# Patient Record
Sex: FEMALE | Race: WHITE
[De-identification: names, ages, dates, MRNs, and addresses within clinical notes are randomized per-mention and may not be internally consistent; named-entity substitution may affect disease eponyms.]

---

## 2017-03-21 NOTE — CR
EXAM DATE: 17



PATIENT'S AGE: 31



Patient: JOSELITO EUGENE



Facility: [place], [place]

Patient ID: [id number]

Site Patient ID: [id number].

Site Accession #: [id number]

: 1985

Study: XRay Chest [id number]/20/2017 4:43:51 PM

Ordering Physician: Luis Magallon



Final Report: 

INDICATION: abnormal weight loss, cough



TECHNIQUE:

Chest 2 views.



COMPARISON:

17



FINDINGS:

Cardiovascular and mediastinum: Heart size and vasculature are normal in 
caliber and appearance. Mediastinum is within normal limits. 



Lungs and pleural spaces: Lungs are clear. No sign of infiltrate or mass. No 
sign of pleural effusion. No pneumothorax. 



Bones and soft tissues: No significant findings. 



IMPRESSION:

Unremarkable chest.



Dictated by: Rodrigo Redmond MD @ 2017 21:01:07

(Electronic Signature)



Report Signed by Proxy and Original Signed Document filed in the

Medical Record.
MTDD

## 2017-12-02 ENCOUNTER — HOSPITAL ENCOUNTER (EMERGENCY)
Dept: HOSPITAL 56 - MW.ED | Age: 32
Discharge: HOME | End: 2017-12-02
Payer: SELF-PAY

## 2017-12-02 VITALS — DIASTOLIC BLOOD PRESSURE: 63 MMHG | SYSTOLIC BLOOD PRESSURE: 116 MMHG

## 2017-12-02 DIAGNOSIS — F17.210: ICD-10-CM

## 2017-12-02 DIAGNOSIS — H70.91: Primary | ICD-10-CM

## 2017-12-02 DIAGNOSIS — F41.9: ICD-10-CM

## 2017-12-02 DIAGNOSIS — J32.2: ICD-10-CM

## 2017-12-02 LAB
CHLORIDE SERPL-SCNC: 107 MMOL/L (ref 98–110)
SODIUM SERPL-SCNC: 139 MMOL/L (ref 136–146)

## 2017-12-02 PROCEDURE — 96361 HYDRATE IV INFUSION ADD-ON: CPT

## 2017-12-02 PROCEDURE — 71010: CPT

## 2017-12-02 PROCEDURE — 81001 URINALYSIS AUTO W/SCOPE: CPT

## 2017-12-02 PROCEDURE — 96374 THER/PROPH/DIAG INJ IV PUSH: CPT

## 2017-12-02 PROCEDURE — 99285 EMERGENCY DEPT VISIT HI MDM: CPT

## 2017-12-02 PROCEDURE — 80305 DRUG TEST PRSMV DIR OPT OBS: CPT

## 2017-12-02 PROCEDURE — C9113 INJ PANTOPRAZOLE SODIUM, VIA: HCPCS

## 2017-12-02 PROCEDURE — 80053 COMPREHEN METABOLIC PANEL: CPT

## 2017-12-02 PROCEDURE — G0480 DRUG TEST DEF 1-7 CLASSES: HCPCS

## 2017-12-02 PROCEDURE — 85025 COMPLETE CBC W/AUTO DIFF WBC: CPT

## 2017-12-02 PROCEDURE — 81025 URINE PREGNANCY TEST: CPT

## 2017-12-02 PROCEDURE — 70450 CT HEAD/BRAIN W/O DYE: CPT

## 2017-12-02 PROCEDURE — 36415 COLL VENOUS BLD VENIPUNCTURE: CPT

## 2017-12-02 PROCEDURE — 84484 ASSAY OF TROPONIN QUANT: CPT

## 2017-12-02 PROCEDURE — 93005 ELECTROCARDIOGRAM TRACING: CPT

## 2017-12-02 PROCEDURE — 96375 TX/PRO/DX INJ NEW DRUG ADDON: CPT

## 2017-12-02 NOTE — EDM.PDOC
ED HPI GENERAL MEDICAL PROBLEM





- General


Chief Complaint: Neuro Symptoms/Deficits


Stated Complaint: PT FEEL LIGHTHEADED


Time Seen by Provider: 12/02/17 20:14


Source of Information: Reports: Patient





- History of Present Illness


INITIAL COMMENTS - FREE TEXT/NARRATIVE: 





HISTORY AND PHYSICAL:


History of present illness:


[Patient with history of anxiety and as stopped her medication presents with 

lightheaded dizziness and shortness of breath that is improved by drinking 

alcohol. She appears intoxicated and smells of alcohol and is in no other 

apparent distress





Denies fever nausea vomiting chills sweats no chest pain headache palpitation 

bowel or urine symptoms





Denies any other chronic illness or disease denies current medications she was 

previously on Lexapro





She smokes heavily denies illicit drugs and admitting drinks up to 18 beers 

daily, previous detox and treatment





]


Review of systems: 


As per history of present illness and below otherwise all systems reviewed and 

negative.


Past medical history: 


As per history of present illness and as reviewed below otherwise 

noncontributory.


Surgical history: 


As per history of present illness and as reviewed below otherwise 

noncontributory.


Social history: 


No reported history of drug or alcohol abuse.


Family history: 


As per history of present illness and as reviewed below otherwise 

noncontributory.








Physical exam:


HEENT: Atraumatic, normocephalic, pupils reactive, negative for conjunctival 

pallor or scleral icterus, mucous membranes moist, throat clear, neck supple, 

nontender, trachea midline.


Lungs: Clear to auscultation, breath sounds equal bilaterally, chest nontender.


Heart: S1S2, regular, negative for clicks, rubs, or JVD.


Abdomen: Soft, nondistended, nontender. Negative for masses or 

hepatosplenomegaly. Negative for costovertebral tenderness.


Pelvis: Stable nontender.


Genitourinary: Deferred.


Rectal: Deferred.


Extremities: Atraumatic, negative for cords or calf pain. Neurovascular 

unremarkable.


Neuro: Awake, alert, oriented. Cranial nerves II through XII unremarkable. 

Cerebellum unremarkable. Motor and sensory unremarkable throughout. Exam 

nonfocal.








Diagnostics:


[]CBC CMP UA hCG troponin drug screen


Chest 1 view


EKG


Head CT no contrast








Therapeutics:


[]1 L normal saline bolus


Protonix 80 mg IV


Zofran 8 mg IV





Augmentin 875 per 125 by mouth twice a day #20 no refill








Impression: 


[Alcohol intoxication


Right mastoid and ethmoid sinusitis


Anxiety


Medication noncompliance


]


Definitive disposition and diagnosis as appropriate pending reevaluation and 

review of above.





- Related Data


 Allergies











Allergy/AdvReac Type Severity Reaction Status Date / Time


 


No Known Allergies Allergy   Verified 12/02/17 20:35











Home Meds: 


 Home Meds





. [No Known Home Meds]  12/02/17 [History]











Past Medical History





- Past Health History


Medical/Surgical History: Denies Medical/Surgical History


HEENT History: Reports: None


Cardiovascular History: Reports: None


Respiratory History: Reports: None


Gastrointestinal History: Reports: None


OB/GYN History: Reports: None


Musculoskeletal History: Reports: None


Neurological History: Reports: None


Psychiatric History: Reports: None


Endocrine/Metabolic History: Reports: None


Oncologic (Cancer) History: Reports: None


Dermatologic History: Reports: None





- Infectious Disease History


Infectious Disease History: Reports: Chicken Pox





- Past Surgical History


HEENT Surgical History: Reports: None


Cardiovascular Surgical History: Reports: None


Endocrine Surgical History: Reports: None


Neurological Surgical History: Reports: None


Musculoskeletal Surgical History: Reports: None


Dermatological Surgical History: Reports: None





Social & Family History





- Family History


HEENT: Reports: None


Cardiac: Reports: None


Endocrine/Metabolic: Reports: None





- Tobacco Use


Smoking Status *Q: Current Every Day Smoker


Years of Tobacco use: 10


Packs/Tins Daily: 2





- Caffeine Use


Caffeine Use: Reports: None





- Alcohol Use


Days Per Week of Alcohol Use: 1


Number of Drinks Per Day: 1


Total Drinks Per Week: 1





- Recreational Drug Use


Recreational Drug Use: No





ED ROS GENERAL





- Review of Systems


Review Of Systems: ROS reveals no pertinent complaints other than HPI.





ED EXAM, GENERAL





- Physical Exam


Exam: See Below





Course





- Vital Signs


Last Recorded V/S: 


 Last Vital Signs











Temp  97.1 F   12/02/17 20:15


 


Pulse  92   12/02/17 20:15


 


Resp  14   12/02/17 20:15


 


BP  166/97 H  12/02/17 20:15


 


Pulse Ox  96   12/02/17 20:15














- Orders/Labs/Meds


Orders: 


 Active Orders 24 hr











 Category Date Time Status


 


 EKG Documentation Completion [RC] STAT Care  12/02/17 20:13 Active


 


 Chest 1V Frontal [CR] Stat Exams  12/02/17 20:13 Taken


 


 Head wo Cont [CT] Stat Exams  12/02/17 20:13 Taken


 


 UA W/MICROSCOPIC [URIN] Stat Lab  12/02/17 21:06 Results











Labs: 


 Laboratory Tests











  12/02/17 12/02/17 12/02/17 Range/Units





  20:22 20:22 21:06 


 


WBC  10.58    (4.0-11.0)  K/uL


 


RBC  4.89    (4.30-5.90)  M/uL


 


Hgb  15.9    (12.0-16.0)  g/dL


 


Hct  45.8    (36.0-46.0)  %


 


MCV  93.7    (80.0-98.0)  fL


 


MCH  32.5 H    (27.0-32.0)  pg


 


MCHC  34.7    (31.0-37.0)  g/dL


 


RDW Std Deviation  50.2    (28.0-62.0)  fl


 


RDW Coeff of Flo  15    (11.0-15.0)  %


 


Plt Count  181    (150-400)  K/uL


 


MPV  9.80    (7.40-12.00)  fL


 


Neut % (Auto)  60.5    (48.0-80.0)  %


 


Lymph % (Auto)  30.5    (16.0-40.0)  %


 


Mono % (Auto)  7.0    (0.0-15.0)  %


 


Eos % (Auto)  1.8    (0.0-7.0)  %


 


Baso % (Auto)  0.2    (0.0-1.5)  %


 


Neut # (Auto)  6.4 H    (1.4-5.7)  K/uL


 


Lymph # (Auto)  3.2 H    (0.6-2.4)  K/uL


 


Mono # (Auto)  0.7    (0.0-0.8)  K/uL


 


Eos # (Auto)  0.2    (0.0-0.7)  K/uL


 


Baso # (Auto)  0.0    (0.0-0.1)  K/uL


 


Nucleated RBC %  0.0    /100WBC


 


Nucleated RBCs #  0    K/uL


 


Sodium   139   (136-146)  mmol/L


 


Potassium   4.0   (3.5-5.1)  mmol/L


 


Chloride   107   ()  mmol/L


 


Carbon Dioxide   20 L   (21-31)  mmol/L


 


BUN   7   (6.0-23.0)  mg/dL


 


Creatinine   0.7   (0.6-1.5)  mg/dL


 


Est Cr Clr Drug Dosing   101.48   mL/min


 


Estimated GFR (MDRD)   > 60.0   ml/min


 


Glucose   91   ()  mg/dL


 


Calcium   9.4   (8.8-10.8)  mg/dL


 


Total Bilirubin   0.8   (0.1-1.5)  mg/dL


 


AST   31   (5-40)  IU/L


 


ALT   20   (8-54)  IU/L


 


Alkaline Phosphatase   77   ()  


 


Troponin I   < 0.10   (0.0-0.29)  NG/ML


 


Total Protein   8.4 H   (6.0-8.0)  g/dL


 


Albumin   4.3   (3.5-5.0)  g/dL


 


Globulin   4.1 H   (2.0-3.5)  g/dL


 


Albumin/Globulin Ratio   1.1 L   (1.3-2.8)  


 


Urine Color     


 


Urine Appearance     


 


Urine pH     (5.0-8.0)  


 


Ur Specific Gravity     (1.001-1.035)  


 


Urine Protein     (NEGATIVE)  mg/dL


 


Urine Glucose (UA)     (NEGATIVE)  mg/dL


 


Urine Ketones     (NEGATIVE)  mg/dL


 


Urine Occult Blood     (NEGATIVE)  


 


Urine Nitrite     (NEGATIVE)  


 


Urine Bilirubin     (NEGATIVE)  


 


Urine Urobilinogen     (<2.0)  EU/dL


 


Ur Leukocyte Esterase     (NEGATIVE)  


 


Urine HCG, Qual    NEGATIVE  (NEGATIVE)  


 


Urine Opiates Screen     (NEGATIVE)  


 


Ur Oxycodone Screen     (NEGATIVE)  


 


Urine Methadone Screen     (NEGATIVE)  


 


Ur Barbiturates Screen     (NEGATIVE)  


 


Ur Phencyclidine Scrn     (NEGATIVE)  


 


Ur Amphetamine Screen     (NEGATIVE)  


 


U Methamphetamines Scrn     (NEGATIVE)  


 


U Benzodiazepines Scrn     (NEGATIVE)  


 


U Cocaine Metab Screen     (NEGATIVE)  


 


U Marijuana (THC) Screen     (NEGATIVE)  


 


Ethyl Alcohol   254.4   mg/dL














  12/02/17 12/02/17 Range/Units





  21:06 21:06 


 


WBC    (4.0-11.0)  K/uL


 


RBC    (4.30-5.90)  M/uL


 


Hgb    (12.0-16.0)  g/dL


 


Hct    (36.0-46.0)  %


 


MCV    (80.0-98.0)  fL


 


MCH    (27.0-32.0)  pg


 


MCHC    (31.0-37.0)  g/dL


 


RDW Std Deviation    (28.0-62.0)  fl


 


RDW Coeff of Flo    (11.0-15.0)  %


 


Plt Count    (150-400)  K/uL


 


MPV    (7.40-12.00)  fL


 


Neut % (Auto)    (48.0-80.0)  %


 


Lymph % (Auto)    (16.0-40.0)  %


 


Mono % (Auto)    (0.0-15.0)  %


 


Eos % (Auto)    (0.0-7.0)  %


 


Baso % (Auto)    (0.0-1.5)  %


 


Neut # (Auto)    (1.4-5.7)  K/uL


 


Lymph # (Auto)    (0.6-2.4)  K/uL


 


Mono # (Auto)    (0.0-0.8)  K/uL


 


Eos # (Auto)    (0.0-0.7)  K/uL


 


Baso # (Auto)    (0.0-0.1)  K/uL


 


Nucleated RBC %    /100WBC


 


Nucleated RBCs #    K/uL


 


Sodium    (136-146)  mmol/L


 


Potassium    (3.5-5.1)  mmol/L


 


Chloride    ()  mmol/L


 


Carbon Dioxide    (21-31)  mmol/L


 


BUN    (6.0-23.0)  mg/dL


 


Creatinine    (0.6-1.5)  mg/dL


 


Est Cr Clr Drug Dosing    mL/min


 


Estimated GFR (MDRD)    ml/min


 


Glucose    ()  mg/dL


 


Calcium    (8.8-10.8)  mg/dL


 


Total Bilirubin    (0.1-1.5)  mg/dL


 


AST    (5-40)  IU/L


 


ALT    (8-54)  IU/L


 


Alkaline Phosphatase    ()  


 


Troponin I    (0.0-0.29)  NG/ML


 


Total Protein    (6.0-8.0)  g/dL


 


Albumin    (3.5-5.0)  g/dL


 


Globulin    (2.0-3.5)  g/dL


 


Albumin/Globulin Ratio    (1.3-2.8)  


 


Urine Color  YELLOW   


 


Urine Appearance  CLEAR   


 


Urine pH  5.5   (5.0-8.0)  


 


Ur Specific Gravity  1.015   (1.001-1.035)  


 


Urine Protein  NEGATIVE   (NEGATIVE)  mg/dL


 


Urine Glucose (UA)  NEGATIVE   (NEGATIVE)  mg/dL


 


Urine Ketones  NEGATIVE   (NEGATIVE)  mg/dL


 


Urine Occult Blood  NEGATIVE   (NEGATIVE)  


 


Urine Nitrite  NEGATIVE   (NEGATIVE)  


 


Urine Bilirubin  NEGATIVE   (NEGATIVE)  


 


Urine Urobilinogen  0.2   (<2.0)  EU/dL


 


Ur Leukocyte Esterase  NEGATIVE   (NEGATIVE)  


 


Urine HCG, Qual    (NEGATIVE)  


 


Urine Opiates Screen   NEGATIVE  (NEGATIVE)  


 


Ur Oxycodone Screen   NEGATIVE  (NEGATIVE)  


 


Urine Methadone Screen   NEGATIVE  (NEGATIVE)  


 


Ur Barbiturates Screen   NEGATIVE  (NEGATIVE)  


 


Ur Phencyclidine Scrn   NEGATIVE  (NEGATIVE)  


 


Ur Amphetamine Screen   NEGATIVE  (NEGATIVE)  


 


U Methamphetamines Scrn   NEGATIVE  (NEGATIVE)  


 


U Benzodiazepines Scrn   NEGATIVE  (NEGATIVE)  


 


U Cocaine Metab Screen   NEGATIVE  (NEGATIVE)  


 


U Marijuana (THC) Screen   NEGATIVE  (NEGATIVE)  


 


Ethyl Alcohol    mg/dL











Meds: 


Medications














Discontinued Medications














Generic Name Dose Route Start Last Admin





  Trade Name Freq  PRN Reason Stop Dose Admin


 


Sodium Chloride  1,000 mls @ 999 mls/hr  12/02/17 20:13  12/02/17 20:21





  Normal Saline  IV  12/02/17 21:13  999 mls/hr





  STAT ONE   Administration


 


Ondansetron HCl  8 mg  12/02/17 20:13  12/02/17 20:21





  Zofran  IVPUSH  12/02/17 20:14  8 mg





  ONETIME ONE   Administration


 


Pantoprazole Sodium  80 mg  12/02/17 20:13  12/02/17 20:21





  Protonix Iv***  IVPUSH  12/02/17 20:14  80 mg





  .BOLUS ONE   Administration














Departure





- Departure


Time of Disposition: 21:19


Disposition: Home, Self-Care 01


Condition: Good


Clinical Impression: 


 Sinusitis, Mastoiditis, Alcohol intoxication








- Discharge Information


Referrals: 


PCP,None [Primary Care Provider] - 


Forms:  ED Department Discharge


Additional Instructions: 


Medication as prescribed


Return if symptoms persist or worsen


Alcohol cessation  recommended


Follow-up with ENT concerning mastoiditis, call number below to arrange 

appropriate follow-up





[facility]


[address] [place] [id number]


Phone: [phone]


Fax: [phone]





The following information is given to patients seen in the emergency department 

who are being discharged to home. This information is to outline your options 

for follow-up care. We provide all patients seen in our emergency department 

with a follow-up referral.





The need for follow-up, as well as the timing and circumstances, are variable 

depending upon the specifics of your emergency department visit.





If you don't have a primary care physician on staff, we will provide you with a 

referral. We always advise you to contact your personal physician following an 

emergency department visit to inform them of the circumstance of the visit and 

for follow-up with them and/or the need for any referrals to a consulting 

specialist.





The emergency department will also refer you to a specialist when appropriate. 

This referral assures that you have the opportunity for follow-up care with a 

specialist. All of these measure are taken in an effort to provide you with 

optimal care, which includes your follow-up.





Under all circumstances we always encourage you to contact your private 

physician who remains a resource for coordinating your care. When calling for 

follow-up care, please make the office aware that this follow-up is from your 

recent emergency room visit. If for any reason you are refused follow-up, 

please contact the [facility] emergency department at [phone] 

and asked to speak to the emergency department charge nurse.








- My Orders


Last 24 Hours: 


My Active Orders





12/02/17 20:13


EKG Documentation Completion [RC] STAT 


Chest 1V Frontal [CR] Stat 


Head wo Cont [CT] Stat 





12/02/17 21:06


UA W/MICROSCOPIC [URIN] Stat 














- Assessment/Plan


Last 24 Hours: 


My Active Orders





12/02/17 20:13


EKG Documentation Completion [RC] STAT 


Chest 1V Frontal [CR] Stat 


Head wo Cont [CT] Stat 





12/02/17 21:06


UA W/MICROSCOPIC [URIN] Stat

## 2017-12-04 ENCOUNTER — HOSPITAL ENCOUNTER (EMERGENCY)
Dept: HOSPITAL 56 - MW.ED | Age: 32
Discharge: HOME | End: 2017-12-04
Payer: SELF-PAY

## 2017-12-04 VITALS — SYSTOLIC BLOOD PRESSURE: 148 MMHG | DIASTOLIC BLOOD PRESSURE: 94 MMHG

## 2017-12-04 DIAGNOSIS — J32.9: ICD-10-CM

## 2017-12-04 DIAGNOSIS — F17.210: ICD-10-CM

## 2017-12-04 DIAGNOSIS — Z79.899: ICD-10-CM

## 2017-12-04 DIAGNOSIS — F41.9: Primary | ICD-10-CM

## 2017-12-04 PROCEDURE — 99283 EMERGENCY DEPT VISIT LOW MDM: CPT

## 2017-12-04 PROCEDURE — 84439 ASSAY OF FREE THYROXINE: CPT

## 2017-12-04 PROCEDURE — 36415 COLL VENOUS BLD VENIPUNCTURE: CPT

## 2017-12-04 PROCEDURE — 84443 ASSAY THYROID STIM HORMONE: CPT

## 2017-12-04 PROCEDURE — 96374 THER/PROPH/DIAG INJ IV PUSH: CPT

## 2017-12-04 NOTE — CT
EXAM DATE: 17



PATIENT'S AGE: 32





Patient: JOSELITO EUGENE



Facility: [place], [place]

Patient ID: [id number]

Site Patient ID: [id number].

Site Accession #: [id number]

: 1985

Study: CT Head [id number]/2/2017 8:42:03 PM

Ordering Physician: Doctor Temp



Final Report: 

INDICATION:

Lightheadedness.



TECHNIQUE:

CT Head without contrast.



COMPARISON:

None.



FINDINGS:

CSF spaces: Within normal limits for age.

Brain parenchyma: The gray-white differentiation is normal. No sign of mass, 
hemorrhage, or midline shift.

Skull base and calvarium: Moderately prominent mucosal thickening in the 
visualized mastoid air cells with some extending into the right ethmoid air 
cells.. The visualized orbits are grossly unremarkable. No skull fractures. .



IMPRESSION:

No intracranial finding of significance. Mastoid and right ethmoid sinusitis.



Please note that all CT scans at this facility use dose modulation, iterative 
reconstruction, and/or weight-based dosing when appropriate to reduce radiation 
dose to as low as reasonably achievable.



Dictated by Adam Blount MD @ Dec 2 2017 8:50PM

(Electronic Signature)



Report Signed by Proxy.
[facility]

## 2017-12-04 NOTE — CR
EXAM DATE: 17



PATIENT'S AGE: 32





Patient: JOSELITO EUGENE



Facility: [place], [place]

Patient ID: [id number]

Site Patient ID: [id number].

Site Accession #: [id number]

: 1985

Study: XRay Chest [id number]/2/2017 8:41:41 PM

Ordering Physician: Doctor Temp



Final Report: 

INDICATION: Lightheaded x1 week

COMPARISON: 

Single AP view 



Findings: The lungs are clear. Pulmonary vascularity, mediastinum and cardiac 
silhouette are within normal limits. No effusions and no pneumothorax. Osseous 
structures appear unremarkable.



Impression: No evidence of acute cardiopulmonary disease.



Dictated by: Adam Blount MD @ 2017 20:48:44

(Electronic Signature)



Report Signed by Proxy.
[facility]

## 2017-12-04 NOTE — EDM.PDOC
ED HPI GENERAL MEDICAL PROBLEM





- General


Chief Complaint: Behavioral/Psych


Stated Complaint: HEAD/FACE NUMB


Time Seen by Provider: 12/04/17 17:30


Source of Information: Reports: Patient





- History of Present Illness


INITIAL COMMENTS - FREE TEXT/NARRATIVE: 





33 yo fm with history of anxiety and alcohol use disorder presents to Ed c/o BL 

facial tingling and increasing anxiousness. She also has been experiencing 

cough and runny nose for the past few days. She presented to the ED 2 days ago 

with similar complaints and had complete work-up. Her labs and CXR were 

insignificant but CT head was suggestive of sinusitus. She was prescribed 

Augmentin on discharge. She was previously on Lexapro for anxiety but 

discontinued it on her own this past month. She felt her anxiety worsened after 

that. She admits to drinking either 1 bottle of vodka daily or 8 beers daily 

but for last 2 days has only been able to drink 2 beers on each day. She denies 

any chest pain, weakness, dysphagia, syncope, gait abnormality, vision 

disturbance, fall, fever, chills, nausea, vomiting, diarrhea, dysuria, hematuria

, suicidal or homicidal ideation.      





- Related Data


 Allergies











Allergy/AdvReac Type Severity Reaction Status Date / Time


 


No Known Allergies Allergy   Verified 12/02/17 20:35











Home Meds: 


 Home Meds





Escitalopram [Lexapro] 20 mg PO DAILY 12/04/17 [History]











Past Medical History





- Past Health History


Medical/Surgical History: Denies Medical/Surgical History


HEENT History: Reports: None


Cardiovascular History: Reports: None


Respiratory History: Reports: None


Gastrointestinal History: Reports: None


OB/GYN History: Reports: None


Musculoskeletal History: Reports: None


Neurological History: Reports: None


Psychiatric History: Reports: None


Endocrine/Metabolic History: Reports: None


Oncologic (Cancer) History: Reports: None


Dermatologic History: Reports: None





- Infectious Disease History


Infectious Disease History: Reports: Chicken Pox





- Past Surgical History


HEENT Surgical History: Reports: None


Cardiovascular Surgical History: Reports: None


Endocrine Surgical History: Reports: None


Neurological Surgical History: Reports: None


Musculoskeletal Surgical History: Reports: None


Dermatological Surgical History: Reports: None





Social & Family History





- Family History


HEENT: Reports: None


Cardiac: Reports: None


Endocrine/Metabolic: Reports: None





- Tobacco Use


Smoking Status *Q: Current Every Day Smoker


Years of Tobacco use: 10


Packs/Tins Daily: 2





- Caffeine Use


Caffeine Use: Reports: None





- Alcohol Use


Days Per Week of Alcohol Use: 1


Number of Drinks Per Day: 1


Total Drinks Per Week: 1





- Recreational Drug Use


Recreational Drug Use: No





ED ROS GENERAL





- Review of Systems


Review Of Systems: See Below


Constitutional: Reports: No Symptoms


HEENT: Reports: No Symptoms


Respiratory: Reports: Cough


Cardiovascular: Reports: No Symptoms


Endocrine: Reports: No Symptoms


GI/Abdominal: Reports: No Symptoms


: Reports: No Symptoms


Musculoskeletal: Reports: No Symptoms


Skin: Reports: No Symptoms


Neurological: Reports: Tingling (of face )


Psychiatric: Reports: Anxiety


Hematologic/Lymphatic: Reports: No Symptoms


Immunologic: Reports: No Symptoms





ED EXAM, GENERAL





- Physical Exam


Exam: See Below


Exam Limited By: Altered Mental Status


General Appearance: Alert, WD/WN, No Apparent Distress


Eye Exam: Bilateral Eye: Nystagmus


Ears: Normal External Exam, Hearing Grossly Normal


Nose: Normal Inspection, Normal Mucosa, No Blood


Throat/Mouth: Normal Inspection, Normal Oropharynx, Normal Voice, No Airway 

Compromise


Head: Atraumatic, Normocephalic


Neck: Normal Inspection, Supple, Non-Tender, Full Range of Motion


Respiratory/Chest: No Respiratory Distress, Lungs Clear, Normal Breath Sounds


Cardiovascular: Normal Peripheral Pulses, Regular Rate, Rhythm, No Edema, No JVD

, No Murmur


Peripheral Pulses: 2+: Carotid (L), Carotid (R), Radial (L), Radial (R)


GI/Abdominal: Normal Bowel Sounds, Soft, Non-Tender, No Distention


Back Exam: Normal Inspection, Full Range of Motion


Extremities: Normal Inspection, Normal Range of Motion, Non-Tender, Normal 

Capillary Refill


Neurological: Alert, Oriented, CN II-XII Intact, Normal Cognition, Normal Gait, 

Normal Reflexes, No Motor/Sensory Deficits


Psychiatric: Anxious


Skin Exam: Warm, Dry, Intact


Lymphatic: No Adenopathy





Course





- Vital Signs


Last Recorded V/S: 


 Last Vital Signs











Temp  35.6 C   12/04/17 17:19


 


Pulse  89   12/04/17 17:19


 


Resp  20   12/04/17 17:19


 


BP  155/88 H  12/04/17 17:19


 


Pulse Ox  97   12/04/17 17:19














- Orders/Labs/Meds


Labs: 


 Laboratory Tests











  12/04/17 Range/Units





  18:18 


 


Free T4  0.85  (0.7-1.48)  ng/dL


 


TSH 3rd Generation  0.55  (0.47-5.0)  uIU/mL











Meds: 


Medications














Discontinued Medications














Generic Name Dose Route Start Last Admin





  Trade Name Freq  PRN Reason Stop Dose Admin


 


Lorazepam  1 mg  12/04/17 18:03  12/04/17 18:18





  Ativan  IVPUSH  12/04/17 18:04  1 mg





  ONETIME ONE   Administration


 


Multivit/Ca Carb/B Cmplx/FA/Prenat  1 cap  12/04/17 18:33  





  Renal Caps Softgel  PO  12/04/17 18:34  





  DAILY ONE   


 


Thiamine HCl  100 mg  12/04/17 18:32  





  Vitamin B-1  PO  12/04/17 18:33  





  ONETIME ONE   














Departure





- Departure


Time of Disposition: 19:03


Disposition: Home, Self-Care 01


Condition: Good


Clinical Impression: 


 Anxiety, Sinusitis








- Discharge Information


Instructions:  Panic Attacks


Referrals: 


Sherita Smith [Primary Care Provider] - 


Forms:  ED Department Discharge


Additional Instructions: 


The following information is given to patients seen in the emergency department 

who are being discharged to home. This information is to outline your options 

for follow-up care. We provide all patients seen in our emergency department 

with a follow-up referral.





The need for follow-up, as well as the timing and circumstances, are variable 

depending upon the specifics of your emergency department visit.





If you don't have a primary care physician on staff, we will provide you with a 

referral. We always advise you to contact your personal physician following an 

emergency department visit to inform them of the circumstance of the visit and 

for follow-up with them and/or the need for any referrals to a consulting 

specialist.





The emergency department will also refer you to a specialist when appropriate. 

This referral assures that you have the opportunity for followup care with a 

specialist. All of these measure are taken in an effort to provide you with 

optimal care, which includes your followup.





Under all circumstances we always encourage you to contact your private 

physician who remains a resource for coordinating  your care. When calling for 

followup care, please make the office aware that this follow-up is from your 

recent emergency room visit. If for any reason you are refused follow-up, 

please contact the [facility] emergency department at [phone] 

and asked to speak to the emergency department charge nurse.





- Problem List Review


Problem List Initiated/Reviewed/Updated: Yes





- Assessment/Plan


Plan: 


Diagnostics:


TSH, Free T4





Therapeutics:


Ativan 1 mg IM single dose, Thiamine, Folic Acid





Assessment:


Acute Anxiety, improved


Sinusitis 





Plan:


1. provided education for reassurance 


2. resume Augmentin until course completed 


3. f/u with PCP for management of anxiety

## 2018-01-12 ENCOUNTER — HOSPITAL ENCOUNTER (EMERGENCY)
Dept: HOSPITAL 56 - MW.ED | Age: 33
Discharge: HOME | End: 2018-01-12
Payer: SELF-PAY

## 2018-01-12 VITALS — DIASTOLIC BLOOD PRESSURE: 89 MMHG | SYSTOLIC BLOOD PRESSURE: 132 MMHG

## 2018-01-12 DIAGNOSIS — F32.9: ICD-10-CM

## 2018-01-12 DIAGNOSIS — Z79.899: ICD-10-CM

## 2018-01-12 DIAGNOSIS — F41.9: Primary | ICD-10-CM

## 2018-01-12 DIAGNOSIS — F10.10: ICD-10-CM

## 2018-01-12 DIAGNOSIS — F17.210: ICD-10-CM

## 2018-01-12 LAB
CHLORIDE SERPL-SCNC: 103 MMOL/L (ref 98–110)
SODIUM SERPL-SCNC: 139 MMOL/L (ref 136–146)

## 2018-01-12 PROCEDURE — 82962 GLUCOSE BLOOD TEST: CPT

## 2018-01-12 PROCEDURE — 85379 FIBRIN DEGRADATION QUANT: CPT

## 2018-01-12 PROCEDURE — 99285 EMERGENCY DEPT VISIT HI MDM: CPT

## 2018-01-12 PROCEDURE — 84703 CHORIONIC GONADOTROPIN ASSAY: CPT

## 2018-01-12 PROCEDURE — 85025 COMPLETE CBC W/AUTO DIFF WBC: CPT

## 2018-01-12 PROCEDURE — 94640 AIRWAY INHALATION TREATMENT: CPT

## 2018-01-12 PROCEDURE — G0480 DRUG TEST DEF 1-7 CLASSES: HCPCS

## 2018-01-12 PROCEDURE — 36415 COLL VENOUS BLD VENIPUNCTURE: CPT

## 2018-01-12 PROCEDURE — 80053 COMPREHEN METABOLIC PANEL: CPT

## 2018-01-12 PROCEDURE — 83735 ASSAY OF MAGNESIUM: CPT

## 2018-01-12 NOTE — EDM.PDOC
ED HPI GENERAL MEDICAL PROBLEM





- General


Chief Complaint: Drug or Alcohol Abuse


Stated Complaint: ANXIETY


Time Seen by Provider: 01/12/18 19:01





- History of Present Illness


INITIAL COMMENTS - FREE TEXT/NARRATIVE: 





HISTORY AND PHYSICAL:





History of present illness:


The patient is a 32-year-old female with a long-standing history of alcohol use 

and abuse who did a rehabilitation program in [place] and completed that several 

months ago but has had a relapse and presents for reevaluation after calling 

EMS. The patient was here on December 2 for sinusitis/mastoiditis and was also 

intoxicated at that time. I reviewed the provider's note from that date. The 

patient says she does drink hard liquor every day and was drinking right before 

calling EMS. She has a history of anxiety and depression and takes medication 

for that and she called the ambulance because she felt very scared because she 

started having strange sensation in her body and she was breathing very fast 

and felt like she could not catch her breath. The patient tells me that she 

does not want to die and that in fact she would like to get help and she would 

like the resources to do that. She currently has no complaints of chest pain or 

shortness of breath no headache no nausea no vomiting. She has not been eating 

and drinking as well as she should and she has no complaints of vomiting or 

diarrhea abdominal pain or any other systemic complaints currently. The patient 

states that she has an ongoing history of anxiety and depression which is not 

new or different and she currently does not feel anxious but says that when she 

called EMS she may have been an anxious episode. Patient says that she has been 

coughing more than usual and her boyfriend is ill with an upper respiratory 

tract infection.





Review of systems: 


As per history of present illness and below otherwise all systems reviewed and 

negative.





Past medical history: 


As per history of present illness and as reviewed below otherwise 

noncontributory.





Surgical history: 


As per history of present illness and as reviewed below otherwise 

noncontributory.





Social history: 


No reported history of drug or alcohol abuse.





Family history: 


As per history of present illness and as reviewed below otherwise 

noncontributory.





Physical exam:


Gen.: Well-developed well-nourished overweight female who is nontoxic and vital 

signs been reviewed by me. Patient is speaking clearly and easily in the ED 

without distress or breathlessness but O2 sat is 89-90% on room air.


HEENT: Atraumatic, normocephalic, pupils reactive, sclerae are slightly injected

, negative for conjunctival pallor or scleral icterus, mucous membranes moist, 

throat clear, neck supple, nontender, trachea midline.


Lungs: Clear to auscultation with some scattered expiratory wheezing and coarse 

breath sounds throughout all fields but there is no work of breathing or stridor

, breath sounds equal bilaterally, chest nontender.


Heart: S1S2, regular, negative for clicks, rubs, or JVD.


Abdomen: Soft, nondistended, nontender. Negative for masses or 

hepatosplenomegaly. Negative for costovertebral tenderness.


Pelvis: Stable nontender.


Genitourinary: Deferred.


Rectal: Deferred.


Extremities: Atraumatic, negative for cords or calf pain. Neurovascular 

unremarkable. No pedal edema or leg asymmetry


Neuro: Awake, alert, oriented. Cranial nerves II through XII unremarkable. 

Cerebellum unremarkable. Motor and sensory unremarkable throughout. Exam 

nonfocal.





Diagnostics:


Accu-Chek, CBC CMP influenza chest x-ray magnesium level hCG d-dimer EtOH


Due to positive d-dimer CTA of the chest


Therapeutics:


Duo neb IV IV fluids





2000: Initially when I saw and talked to the patient she did not really want 

treatment but because of the low oxygen level I wanted to evaluate her plus her 

presenting complaints. She was initially agreeable but now at this time she is 

not agreeable and wants to go home with her boyfriend was at bedside. He is 

awake alert and oriented and wants her to have more evaluation but he 

understands that she is resisting. The patient has removed her own IV and is up 

ambulating around the ED without ataxia and is speaking clearly and easily. She 

has had alcohol tonight but is clinically not intoxicated. She is aware that I 

have done blood work and wanted to do imaging to her positive d-dimer. She is 

accepting of all the risks including respiratory distress/arrest pulmonary 

embolism disability permanently. She says she will return if the symptoms 

persist or return. The patient is a habitual tobacco user which could explain 

her lower than normal O2 sats but as I told her and her significant other at 

bedside I am still very concerned but I am not going to restrain the patient to 

perform these tests. She states understanding and the boyfriend says that he 

will stay with her tonight and bring her back if any symptoms worsen or evolve. 

She is currently not breathless and she is awake alert and oriented and as a 

said speaking clearly and easily without slurred speech and has no ataxia with 

her gait





Impression: 


Episode of dyspnea/anxiety with low O2 saturation, etiology unclear, refusing 

medical evaluation, recent alcohol use





Definitive disposition and diagnosis as appropriate pending reevaluation and 

review of above.





- Related Data


 Allergies











Allergy/AdvReac Type Severity Reaction Status Date / Time


 


No Known Allergies Allergy   Verified 01/12/18 19:09











Home Meds: 


 Home Meds





Escitalopram [Lexapro] 20 mg PO DAILY 12/04/17 [History]


Gabapentin [Neurontin] 400 mg PO BID 01/12/18 [History]


buPROPion HCl [Wellbutrin SR] 150 mg PO DAILY 01/12/18 [History]











Past Medical History





- Past Health History


Medical/Surgical History: Denies Medical/Surgical History


HEENT History: Reports: None


Cardiovascular History: Reports: None


Respiratory History: Reports: None


Gastrointestinal History: Reports: None


OB/GYN History: Reports: None


Musculoskeletal History: Reports: None


Neurological History: Reports: None


Psychiatric History: Reports: Anxiety, Depression


Endocrine/Metabolic History: Reports: None


Oncologic (Cancer) History: Reports: None


Dermatologic History: Reports: None





- Infectious Disease History


Infectious Disease History: Reports: Chicken Pox





- Past Surgical History


HEENT Surgical History: Reports: None


Cardiovascular Surgical History: Reports: None


Endocrine Surgical History: Reports: None


Neurological Surgical History: Reports: None


Musculoskeletal Surgical History: Reports: None


Dermatological Surgical History: Reports: None





Social & Family History





- Family History


Family Medical History: Noncontributory


HEENT: Reports: None


Cardiac: Reports: None


Endocrine/Metabolic: Reports: None





- Tobacco Use


Smoking Status *Q: Current Every Day Smoker


Years of Tobacco use: 10


Packs/Tins Daily: 1





- Caffeine Use


Caffeine Use: Reports: Coffee





- Alcohol Use


Days Per Week of Alcohol Use: 7


Number of Drinks Per Day: 10


Total Drinks Per Week: 70


Date of Last Drink: 01/12/18


Time of Last Drink: 19:14





- Recreational Drug Use


Recreational Drug Use: No





ED ROS GENERAL





- Review of Systems


Review Of Systems: ROS reveals no pertinent complaints other than HPI.





ED EXAM, GENERAL





- Physical Exam


Exam: See Below (See dictation)





Course





- Vital Signs


Last Recorded V/S: 


 Last Vital Signs











Temp  36.6 C   01/12/18 19:01


 


Pulse  99   01/12/18 19:01


 


Resp  12   01/12/18 19:01


 


BP  132/89   01/12/18 19:01


 


Pulse Ox  88 L  01/12/18 19:01














- Orders/Labs/Meds


Orders: 


 Active Orders 24 hr











 Category Date Time Status


 


 Blood Glucose Check, Bedside [RC] ONETIME Care  01/12/18 19:14 Active


 


 Oxygen Therapy, ED [RC] ASDIRECTED Care  01/12/18 19:20 Active


 


 RT Aerosol Therapy [RC] ASDIRECTED Care  01/12/18 19:21 Active


 


 Ang Chest [CT] Stat Exams  01/12/18 19:53 Ordered


 


 Chest 1V Frontal [CR] Stat Exams  01/12/18 19:21 Ordered


 


 INFLUENZA A+B AG SCREEN [RM] Stat Lab  01/12/18 19:21 Uncollected


 


 Sodium Chloride 0.9% [Normal Saline] 1,000 ml Med  01/12/18 19:21 Active





 IV STAT   


 


 Sodium Chloride 0.9% [Saline Flush] Med  01/12/18 19:21 Active





 10 ml FLUSH ASDIRECTED PRN   


 


 Sodium Chloride 0.9% [Saline Flush] Med  01/12/18 19:21 Active





 2.5 ml FLUSH ASDIRECTED PRN   


 


 Saline Lock Insert [OM.PC] Stat Oth  01/12/18 19:20 Ordered








 Medication Orders





Sodium Chloride (Normal Saline)  1,000 mls @ 999 mls/hr IV STAT ONE


   Stop: 01/12/18 20:21


   Last Admin: 01/12/18 19:35  Dose: 999 mls/hr


Sodium Chloride (Saline Flush)  10 ml FLUSH ASDIRECTED PRN


   PRN Reason: Keep Vein Open


Sodium Chloride (Saline Flush)  2.5 ml FLUSH ASDIRECTED PRN


   PRN Reason: Keep Vein Open








Labs: 


 Laboratory Tests











  01/12/18 01/12/18 01/12/18 Range/Units





  19:16 19:33 19:33 


 


WBC   11.39 H   (4.0-11.0)  K/uL


 


RBC   5.12   (4.30-5.90)  M/uL


 


Hgb   16.6 H   (12.0-16.0)  g/dL


 


Hct   46.4 H   (36.0-46.0)  %


 


MCV   90.6   (80.0-98.0)  fL


 


MCH   32.4 H   (27.0-32.0)  pg


 


MCHC   35.8   (31.0-37.0)  g/dL


 


RDW Std Deviation   45.5   (28.0-62.0)  fl


 


RDW Coeff of Flo   14   (11.0-15.0)  %


 


Plt Count   171   (150-400)  K/uL


 


MPV   9.30   (7.40-12.00)  fL


 


Neut % (Auto)   67.8   (48.0-80.0)  %


 


Lymph % (Auto)   21.9   (16.0-40.0)  %


 


Mono % (Auto)   7.3   (0.0-15.0)  %


 


Eos % (Auto)   2.6   (0.0-7.0)  %


 


Baso % (Auto)   0.4   (0.0-1.5)  %


 


Neut # (Auto)   7.7 H   (1.4-5.7)  K/uL


 


Lymph # (Auto)   2.5 H   (0.6-2.4)  K/uL


 


Mono # (Auto)   0.8   (0.0-0.8)  K/uL


 


Eos # (Auto)   0.3   (0.0-0.7)  K/uL


 


Baso # (Auto)   0.0   (0.0-0.1)  K/uL


 


Nucleated RBC %   0.0   /100WBC


 


Nucleated RBCs #   0   K/uL


 


D-Dimer, Quantitative     (0.0-0.52)  mg/LFEU


 


Sodium    139  (136-146)  mmol/L


 


Potassium    3.8  (3.5-5.1)  mmol/L


 


Chloride    103  ()  mmol/L


 


Carbon Dioxide    20 L  (21-31)  mmol/L


 


BUN    10  (6.0-23.0)  mg/dL


 


Creatinine    0.6  (0.6-1.5)  mg/dL


 


Est Cr Clr Drug Dosing    TNP  


 


Estimated GFR (MDRD)    > 60.0  ml/min


 


Glucose    102  ()  mg/dL


 


POC Glucose  90    ()  mg/dL


 


Calcium    9.0  (8.8-10.8)  mg/dL


 


Magnesium    1.5  (1.5-2.3)  mEq/L


 


Total Bilirubin    0.7  (0.1-1.5)  mg/dL


 


AST    142 H  (5-40)  IU/L


 


ALT    126 H  (8-54)  IU/L


 


Alkaline Phosphatase    83  ()  


 


Total Protein    7.4  (6.0-8.0)  g/dL


 


Albumin    4.1  (3.5-5.0)  g/dL


 


Globulin    3.3  (2.0-3.5)  g/dL


 


Albumin/Globulin Ratio    1.2 L  (1.3-2.8)  


 


HCG, Qual     (NEG)  


 


Ethyl Alcohol    368.1  mg/dL














  01/12/18 01/12/18 Range/Units





  19:33 19:33 


 


WBC    (4.0-11.0)  K/uL


 


RBC    (4.30-5.90)  M/uL


 


Hgb    (12.0-16.0)  g/dL


 


Hct    (36.0-46.0)  %


 


MCV    (80.0-98.0)  fL


 


MCH    (27.0-32.0)  pg


 


MCHC    (31.0-37.0)  g/dL


 


RDW Std Deviation    (28.0-62.0)  fl


 


RDW Coeff of Flo    (11.0-15.0)  %


 


Plt Count    (150-400)  K/uL


 


MPV    (7.40-12.00)  fL


 


Neut % (Auto)    (48.0-80.0)  %


 


Lymph % (Auto)    (16.0-40.0)  %


 


Mono % (Auto)    (0.0-15.0)  %


 


Eos % (Auto)    (0.0-7.0)  %


 


Baso % (Auto)    (0.0-1.5)  %


 


Neut # (Auto)    (1.4-5.7)  K/uL


 


Lymph # (Auto)    (0.6-2.4)  K/uL


 


Mono # (Auto)    (0.0-0.8)  K/uL


 


Eos # (Auto)    (0.0-0.7)  K/uL


 


Baso # (Auto)    (0.0-0.1)  K/uL


 


Nucleated RBC %    /100WBC


 


Nucleated RBCs #    K/uL


 


D-Dimer, Quantitative   1.04 H  (0.0-0.52)  mg/LFEU


 


Sodium    (136-146)  mmol/L


 


Potassium    (3.5-5.1)  mmol/L


 


Chloride    ()  mmol/L


 


Carbon Dioxide    (21-31)  mmol/L


 


BUN    (6.0-23.0)  mg/dL


 


Creatinine    (0.6-1.5)  mg/dL


 


Est Cr Clr Drug Dosing    


 


Estimated GFR (MDRD)    ml/min


 


Glucose    ()  mg/dL


 


POC Glucose    ()  mg/dL


 


Calcium    (8.8-10.8)  mg/dL


 


Magnesium    (1.5-2.3)  mEq/L


 


Total Bilirubin    (0.1-1.5)  mg/dL


 


AST    (5-40)  IU/L


 


ALT    (8-54)  IU/L


 


Alkaline Phosphatase    ()  


 


Total Protein    (6.0-8.0)  g/dL


 


Albumin    (3.5-5.0)  g/dL


 


Globulin    (2.0-3.5)  g/dL


 


Albumin/Globulin Ratio    (1.3-2.8)  


 


HCG, Qual  NEGATIVE   (NEG)  


 


Ethyl Alcohol    mg/dL











Meds: 


Medications











Generic Name Dose Route Start Last Admin





  Trade Name Freq  PRN Reason Stop Dose Admin


 


Sodium Chloride  1,000 mls @ 999 mls/hr  01/12/18 19:21  01/12/18 19:35





  Normal Saline  IV  01/12/18 20:21  999 mls/hr





  STAT ONE   Administration


 


Sodium Chloride  10 ml  01/12/18 19:21  





  Saline Flush  FLUSH   





  ASDIRECTED PRN   





  Keep Vein Open   


 


Sodium Chloride  2.5 ml  01/12/18 19:21  





  Saline Flush  FLUSH   





  ASDIRECTED PRN   





  Keep Vein Open   














Discontinued Medications














Generic Name Dose Route Start Last Admin





  Trade Name Freq  PRN Reason Stop Dose Admin


 


Albuterol/Ipratropium  3 ml  01/12/18 19:21  01/12/18 19:35





  Duoneb 3.0-0.5 Mg/3 Ml  NEB  01/12/18 19:22  3 ml





  ONETIME ONE   Administration














Departure





- Departure


Time of Disposition: 20:03


Disposition: Home, Self-Care 01


Condition: Good


Clinical Impression: 


 Alcohol use





Dyspnea


Qualifiers:


 Dyspnea type: unspecified Qualified Code(s): R06.00 - Dyspnea, unspecified








- Discharge Information


Referrals: 


PCP,None [Primary Care Provider] - 


Forms:  ED Department Discharge


Additional Instructions: 


The following information is given to patients seen in the emergency department 

who are being discharged to home. This information is to outline your options 

for follow-up care. We provide all patients seen in our emergency department 

with a follow-up referral.





The need for follow-up, as well as the timing and circumstances, are variable 

depending upon the specifics of your emergency department visit.





If you don't have a primary care physician on staff, we will provide you with a 

referral. We always advise you to contact your personal physician following an 

emergency department visit to inform them of the circumstance of the visit and 

for follow-up with them and/or the need for any referrals to a consulting 

specialist.





The emergency department will also refer you to a specialist when appropriate. 

This referral assures that you have the opportunity for followup care with a 

specialist. All of these measure are taken in an effort to provide you with 

optimal care, which includes your followup.





Under all circumstances we always encourage you to contact your private 

physician who remains a resource for coordinating  your care. When calling for 

followup care, please make the office aware that this follow-up is from your 

recent emergency room visit. If for any reason you are refused follow-up, 

please contact the [facility] emergency 

department at [phone] and ask to speak to the emergency department 

charge nurse.





[facility] 


Primary care- [place] [address]


[phone]








Please refrain from drinking alcohol and push hydration. Please call and follow-

up with one of our clinic providers and try to reduce or stop tobacco use. 

Return to ER as needed and as discussed





- My Orders


Last 24 Hours: 


My Active Orders





01/12/18 19:14


Blood Glucose Check, Bedside [] ONETIME 





01/12/18 19:20


Oxygen Therapy, ED [] ASDIRECTED 


Saline Lock Insert [OM.PC] Stat 





01/12/18 19:21


RT Aerosol Therapy [RC] ASDIRECTED 


Chest 1V Frontal [CR] Stat 


INFLUENZA A+B AG SCREEN [RM] Stat 


Sodium Chloride 0.9% [Normal Saline] 1,000 ml IV STAT 


Sodium Chloride 0.9% [Saline Flush]   10 ml FLUSH ASDIRECTED PRN 


Sodium Chloride 0.9% [Saline Flush]   2.5 ml FLUSH ASDIRECTED PRN 





01/12/18 19:53


Ang Chest [CT] Stat 














- Assessment/Plan


Last 24 Hours: 


My Active Orders





01/12/18 19:14


Blood Glucose Check, Bedside [RC] ONETIME 





01/12/18 19:20


Oxygen Therapy, ED [RC] ASDIRECTED 


Saline Lock Insert [OM.PC] Stat 





01/12/18 19:21


RT Aerosol Therapy [RC] ASDIRECTED 


Chest 1V Frontal [CR] Stat 


INFLUENZA A+B AG SCREEN [RM] Stat 


Sodium Chloride 0.9% [Normal Saline] 1,000 ml IV STAT 


Sodium Chloride 0.9% [Saline Flush]   10 ml FLUSH ASDIRECTED PRN 


Sodium Chloride 0.9% [Saline Flush]   2.5 ml FLUSH ASDIRECTED PRN 





01/12/18 19:53


Ang Chest [CT] Stat

## 2018-01-14 ENCOUNTER — HOSPITAL ENCOUNTER (EMERGENCY)
Dept: HOSPITAL 56 - MW.ED | Age: 33
Discharge: HOME | End: 2018-01-14
Payer: SELF-PAY

## 2018-01-14 VITALS — SYSTOLIC BLOOD PRESSURE: 138 MMHG | DIASTOLIC BLOOD PRESSURE: 67 MMHG

## 2018-01-14 DIAGNOSIS — F32.9: ICD-10-CM

## 2018-01-14 DIAGNOSIS — F17.210: ICD-10-CM

## 2018-01-14 DIAGNOSIS — Z79.899: ICD-10-CM

## 2018-01-14 DIAGNOSIS — J06.9: Primary | ICD-10-CM

## 2018-01-14 NOTE — EDM.PDOC
ED HPI GENERAL MEDICAL PROBLEM





- General


Chief Complaint: Respiratory Problem


Stated Complaint: TROUBLE BREATHING


Time Seen by Provider: 01/14/18 11:05


Source of Information: Reports: Patient


History Limitations: Reports: No Limitations





- History of Present Illness


INITIAL COMMENTS - FREE TEXT/NARRATIVE: 


HISTORY AND PHYSICAL:





History of present illness:


[Patient comes to the emergency room complaining of cough and chest congestion 

for the past 2 weeks. Feels that her symptoms have worsened over the past 1 

week. Has had fever and chills on and off but has not checked her temperature. 

Is blowing out thick green discharge mixed w/  blood out of her nose and is 

coughing up thick green sputum. Complains of bilateral ear pain worse on the 

right than the left. Facial pressure, sore throat. No muscle or joint aches or 

pains. No abdominal pain nausea or vomiting. Appetite has been mildly 

diminished but she's been trying to push fluids. She has not taken any over-the-

counter medications for her symptoms.  she was on antibiotics about one month 

ago for a sinus infection. She follows with Shannon Smith at her primary care 

clinic.


Smokes 2 packs of cigarettes per day. Admits to a history of alcoholism. Denies 

drug use]





Review of systems: 


As per history of present illness and below otherwise all systems reviewed and 

negative.





Past medical history: 


As per history of present illness and as reviewed below otherwise 

noncontributory.





Surgical history: 


As per history of present illness and as reviewed below otherwise 

noncontributory.





Social history: 


No reported history of drug or alcohol abuse.





Family history: 


As per history of present illness and as reviewed below otherwise 

noncontributory.





Physical exam:


HEENT: Atraumatic, normocephalic. Oral mucous membranes are pink and moist. 

Throat is mildly erythematous. No swelling or exudate. Maxillary sinuses are 

tender with palpation. Left TM is dull and erythematous. Right TM is pearly 

gray but has a mild effusion. Neck supple no lymphadenopathy.


Lungs: Wheezing is appreciated to bilateral lower lung lobes. Crackles or rales.


Heart: S1S2, regular rate and rhythm.


Abdomen: Soft, nondistended, nontender.


Pelvis: Stable nontender.


Genitourinary: Deferred.


Rectal: Deferred.


Extremities: Atraumatic, no deformity. Neurovascular unremarkable.


Neuro: Awake, alert, oriented.  Motor and sensory unremarkable throughout. Exam 

nonfocal.





Therapeutics:


[DuoNeb]





Impression: 


[URI]





Plan:


[Wheezing resolves completely with DuoNeb. Rx written for doxycycline 100 mg #

20 sig one by mouth twice a day 0 refills. Encouraged patient to use over-the-

counter analgesics and cough medicines as needed for her symptoms. Follow-up 

with her PCP. Strict return precautions reviewed. She is in agreement with today

's plan. All questions are answered and concerns are addressed.]





Definitive disposition and diagnosis as appropriate pending reevaluation and 

review of above.








  ** back


Pain Score (Numeric/FACES): 8





- Related Data


 Allergies











Allergy/AdvReac Type Severity Reaction Status Date / Time


 


No Known Allergies Allergy   Verified 01/14/18 11:08











Home Meds: 


 Home Meds





Escitalopram [Lexapro] 20 mg PO DAILY 12/04/17 [History]


Gabapentin [Neurontin] 400 mg PO BID 01/12/18 [History]


buPROPion HCl [Wellbutrin SR] 150 mg PO DAILY 01/12/18 [History]











Past Medical History





- Past Health History


Medical/Surgical History: Denies Medical/Surgical History


HEENT History: Reports: None


Cardiovascular History: Reports: None


Respiratory History: Reports: None


Gastrointestinal History: Reports: None


OB/GYN History: Reports: None


Musculoskeletal History: Reports: None


Neurological History: Reports: None


Psychiatric History: Reports: Anxiety, Depression


Endocrine/Metabolic History: Reports: None


Oncologic (Cancer) History: Reports: None


Dermatologic History: Reports: None





- Infectious Disease History


Infectious Disease History: Reports: Chicken Pox





- Past Surgical History


HEENT Surgical History: Reports: None


Cardiovascular Surgical History: Reports: None


Endocrine Surgical History: Reports: None


Neurological Surgical History: Reports: None


Musculoskeletal Surgical History: Reports: None


Dermatological Surgical History: Reports: None





Social & Family History





- Family History


Family Medical History: Noncontributory


HEENT: Reports: None


Cardiac: Reports: None


Endocrine/Metabolic: Reports: None





- Tobacco Use


Smoking Status *Q: Current Every Day Smoker


Years of Tobacco use: 10


Packs/Tins Daily: 2





- Caffeine Use


Caffeine Use: Reports: Coffee





- Alcohol Use


Days Per Week of Alcohol Use: 7


Number of Drinks Per Day: 18


Total Drinks Per Week: 126





- Recreational Drug Use


Recreational Drug Use: No





ED ROS GENERAL





- Review of Systems


Review Of Systems: ROS reveals no pertinent complaints other than HPI.





ED EXAM, GENERAL





- Physical Exam


Exam: See Below





Course





- Vital Signs


Last Recorded V/S: 


 Last Vital Signs











Temp  98.3 F   01/14/18 12:37


 


Pulse  83   01/14/18 12:37


 


Resp  18   01/14/18 12:37


 


BP  138/67   01/14/18 12:37


 


Pulse Ox  94 L  01/14/18 12:37














- Orders/Labs/Meds


Orders: 


 Active Orders 24 hr











 Category Date Time Status


 


 RT Aerosol Therapy [RC] ASDIRECTED Care  01/14/18 11:16 Active











Meds: 


Medications














Discontinued Medications














Generic Name Dose Route Start Last Admin





  Trade Name Freq  PRN Reason Stop Dose Admin


 


Albuterol/Ipratropium  3 ml  01/14/18 11:16  01/14/18 11:25





  Duoneb 3.0-0.5 Mg/3 Ml  NEB  01/14/18 11:17  3 ml





  ONETIME ONE   Administration














Departure





- Departure


Time of Disposition: 12:15


Disposition: Home, Self-Care 01


Condition: Good


Clinical Impression: 


 URI (upper respiratory infection)








- Discharge Information


Instructions:  Upper Respiratory Infection, Adult, Easy-to-Read


Referrals: 


PCP,None [Primary Care Provider] - 


Forms:  ED Department Discharge


Additional Instructions: 


The following information is given to patients seen in the emergency department 

who are being discharged to home. This information is to outline your options 

for follow-up care. We provide all patients seen in our emergency department 

with a follow-up referral.





The need for follow-up, as well as the timing and circumstances, are variable 

depending upon the specifics of your emergency department visit.





If you don't have a primary care physician on staff, we will provide you with a 

referral. We always advise you to contact your personal physician following an 

emergency department visit to inform them of the circumstance of the visit and 

for follow-up with them and/or the need for any referrals to a consulting 

specialist.





The emergency department will also refer you to a specialist when appropriate. 

This referral assures that you have the opportunity for follow-up care with a 

specialist. All of these measure are taken in an effort to provide you with 

optimal care, which includes your follow-up.





Under all circumstances we always encourage you to contact your private 

physician who remains a resource for coordinating your care. When calling for 

follow-up care, please make the office aware that this follow-up is from your 

recent emergency room visit. If for any reason you are refused follow-up, 

please contact the [facility]  emergency 

department at [phone] and asked to speak to the emergency department 

charge nurse.








[facility]


Primary Care


[address] [place] [id number]


Phone: [phone]


Fax: [phone]








Follow-up with your primary care provider or at the clinic listed above in 48-

72 hours.


Taken in a Rx is prescribed for the full course.


Robitussin or Delsym cough syrup as needed for chest congestion and cough. You 

may also take plain Mucinex to help thin her chest secretions.


Tylenol or ibuprofen as needed for fever or discomfort.


Return to ER as needed as discussed.





- My Orders


Last 24 Hours: 


My Active Orders





01/14/18 11:16


RT Aerosol Therapy [RC] ASDIRECTED 














- Assessment/Plan


Last 24 Hours: 


My Active Orders





01/14/18 11:16


RT Aerosol Therapy [RC] ASDIRECTED

## 2018-06-07 ENCOUNTER — HOSPITAL ENCOUNTER (EMERGENCY)
Dept: HOSPITAL 56 - MW.ED | Age: 33
Discharge: HOME | End: 2018-06-07
Payer: SELF-PAY

## 2018-06-07 VITALS — SYSTOLIC BLOOD PRESSURE: 124 MMHG | DIASTOLIC BLOOD PRESSURE: 76 MMHG

## 2018-06-07 DIAGNOSIS — F32.9: ICD-10-CM

## 2018-06-07 DIAGNOSIS — K08.89: Primary | ICD-10-CM

## 2018-06-07 DIAGNOSIS — F17.210: ICD-10-CM

## 2018-06-07 DIAGNOSIS — Z79.899: ICD-10-CM

## 2018-06-07 PROCEDURE — 99282 EMERGENCY DEPT VISIT SF MDM: CPT

## 2018-06-07 NOTE — EDM.PDOC
ED HPI GENERAL MEDICAL PROBLEM





- General


Chief Complaint: ENT Problem


Stated Complaint: MOUTH PAIN


Time Seen by Provider: 18 12:30


Source of Information: Reports: Patient


History Limitations: Reports: No Limitations





- History of Present Illness


INITIAL COMMENTS - FREE TEXT/NARRATIVE: 





HISTORY AND PHYSICAL:





History of present illness:


[Comes to the emergency room complaining of right lower dental pain. She last 

saw a dentist 5 years ago at which time they told her they she had 6 teeth that 

needed removed. She has not had any follow-up dental care since that time. She'

s had swelling to her right lower cheek and increased pain. She does not have 

dental insurance and has found it difficult for follow-up regarding her dental 

care. No fever chills no chest pain shortness of breath or difficulty 

breathing. She has taken some ibuprofen for her tooth pain which has provided 

minimal relief.]





Review of systems: 


As per history of present illness and below otherwise all systems reviewed and 

negative.





Past medical history: 


As per history of present illness and as reviewed below otherwise 

noncontributory.





Surgical history: 


As per history of present illness and as reviewed below otherwise 

noncontributory.





Social history: 


No reported history of drug or alcohol abuse.





Family history: 


As per history of present illness and as reviewed below otherwise 

noncontributory.





Physical exam:


HEENT: Tooth #28 is broken off at the gum line. Is exquisitely tender with 

palpation. Gums are erythematous. Oral mucous membranes are pink and moist. No 

tonsillar swelling. Throat is clear. TMs are pearly gray and equal bilaterally. 

Neck supple, no lymphadenopathy noted. a midline.


Lungs: Clear to auscultation, breath sounds equal bilaterally, chest nontender.


Heart: S1S2, regular, negative for clicks, rubs, or JVD.


Pelvis: Stable nontender.


Genitourinary: Deferred.


Rectal: Deferred.


Extremities: Atraumatic, negative for cords or calf pain. Neurovascular 

unremarkable.


Neuro: Awake, alert, oriented. Cranial nerves II through XII unremarkable. 

Cerebellum unremarkable. Motor and sensory unremarkable throughout. Exam 

nonfocal.





Therapeutics:


[Dental balls]





Impression: 


[Dental pain]





Plan:


[Augmentin 875mg (#20) si po BID 0 RF"s. Dental balls are given. He should 

is given list of local dentists with instructions given to follow-up with one. 

She may call to determine who will take a payment plan. She is also encouraged 

to establish with a local PCP. Strict return precautions are reviewed. She is 

in agreement with today's plan. All questions are answered and concerns are 

addressed.]





Definitive disposition and diagnosis as appropriate pending reevaluation and 

review of above.





  ** Right Lower Oral/Mouth


Pain Score (Numeric/FACES): 9





- Related Data


 Allergies











Allergy/AdvReac Type Severity Reaction Status Date / Time


 


No Known Allergies Allergy   Verified 18 12:09











Home Meds: 


 Home Meds





Escitalopram [Lexapro] 20 mg PO DAILY 17 [History]


buPROPion HCl [Wellbutrin SR] 150 mg PO DAILY 18 [History]











Past Medical History





- Past Health History


Medical/Surgical History: Denies Medical/Surgical History


HEENT History: Reports: None


Cardiovascular History: Reports: None


Respiratory History: Reports: None


Gastrointestinal History: Reports: None


OB/GYN History: Reports: None


Musculoskeletal History: Reports: None


Neurological History: Reports: None


Psychiatric History: Reports: Anxiety, Depression


Endocrine/Metabolic History: Reports: None


Oncologic (Cancer) History: Reports: None


Dermatologic History: Reports: None





- Infectious Disease History


Infectious Disease History: Reports: Chicken Pox





- Past Surgical History


HEENT Surgical History: Reports: None


Cardiovascular Surgical History: Reports: None


Endocrine Surgical History: Reports: None


Neurological Surgical History: Reports: None


Musculoskeletal Surgical History: Reports: None


Dermatological Surgical History: Reports: None





Social & Family History





- Family History


Family Medical History: Noncontributory


HEENT: Reports: None


Cardiac: Reports: None


Endocrine/Metabolic: Reports: None





- Tobacco Use


Smoking Status *Q: Current Every Day Smoker


Years of Tobacco use: 11


Packs/Tins Daily: 1


Used Tobacco, but Quit: No





- Caffeine Use


Caffeine Use: Reports: Coffee





- Alcohol Use


Date of Last Drink: 18





- Recreational Drug Use


Recreational Drug Use: No





ED ROS ENT





- Review of Systems


Review Of Systems: ROS reveals no pertinent complaints other than HPI.





ED EXAM, ENT





- Physical Exam


Exam: See Below





Course





- Vital Signs


Last Recorded V/S: 


 Last Vital Signs











Temp  98.2 F   18 12:11


 


Pulse  70   18 13:25


 


Resp  18   18 13:25


 


BP  124/76   18 13:25


 


Pulse Ox  98   18 13:25














- Orders/Labs/Meds


Meds: 


Medications














Discontinued Medications














Generic Name Dose Route Start Last Admin





  Trade Name Moni  PRN Reason Stop Dose Admin


 


Benzocaine  2 each  18 12:56  18 13:20





  Hurricaine One 20%  MUCMEM  18 12:57  2 each





  ONETIME ONE   Administration





     





     





     





     


 


Lidocaine HCl  15 ml  18 12:56  18 13:20





  Xylocaine 2% Viscous  PO  18 12:57  15 ml





  ONETIME ONE   Administration





     





     





     





     














Departure





- Departure


Time of Disposition: 12:58


Disposition: Home, Self-Care 01


Condition: Good


Clinical Impression: 


 Pain, dental








- Discharge Information


Instructions:  Dental Abscess, Easy-to-Read


Referrals: 


PCP,None [Primary Care Provider] - 


Forms:  ED Department Discharge


Additional Instructions: 


The following information is given to patients seen in the emergency department 

who are being discharged to home. This information is to outline your options 

for follow-up care. We provide all patients seen in our emergency department 

with a follow-up referral.





The need for follow-up, as well as the timing and circumstances, are variable 

depending upon the specifics of your emergency department visit.





If you don't have a primary care physician on staff, we will provide you with a 

referral. We always advise you to contact your personal physician following an 

emergency department visit to inform them of the circumstance of the visit and 

for follow-up with them and/or the need for any referrals to a consulting 

specialist.





The emergency department will also refer you to a specialist when appropriate. 

This referral assures that you have the opportunity for follow-up care with a 

specialist. All of these measure are taken in an effort to provide you with 

optimal care, which includes your follow-up.





Under all circumstances we always encourage you to contact your private 

physician who remains a resource for coordinating your care. When calling for 

follow-up care, please make the office aware that this follow-up is from your 

recent emergency room visit. If for any reason you are refused follow-up, 

please contact the [facility]  emergency 

department at [phone] and asked to speak to the emergency department 

charge nurse.








[facility]


Primary Care


[address] [place] [id number]


Phone: [phone]


Fax: [phone]








Establish care with a local primary care provider and follow-up there in 48-72 

hours.


Take Medications as prescribed. Follow-up with dentist ASAP. Use dental balls 

as prescribed.


Return to ER as needed as discussed.

## 2018-08-29 ENCOUNTER — HOSPITAL ENCOUNTER (EMERGENCY)
Dept: HOSPITAL 56 - MW.ED | Age: 33
Discharge: HOME | End: 2018-08-29
Payer: SELF-PAY

## 2018-08-29 VITALS — SYSTOLIC BLOOD PRESSURE: 151 MMHG | DIASTOLIC BLOOD PRESSURE: 103 MMHG

## 2018-08-29 DIAGNOSIS — F17.210: ICD-10-CM

## 2018-08-29 DIAGNOSIS — F10.239: Primary | ICD-10-CM

## 2018-08-29 DIAGNOSIS — Z79.899: ICD-10-CM

## 2018-08-29 LAB
CHLORIDE SERPL-SCNC: 98 MMOL/L (ref 98–107)
SODIUM SERPL-SCNC: 135 MMOL/L (ref 136–145)

## 2018-08-29 PROCEDURE — 83735 ASSAY OF MAGNESIUM: CPT

## 2018-08-29 PROCEDURE — 83690 ASSAY OF LIPASE: CPT

## 2018-08-29 PROCEDURE — 96361 HYDRATE IV INFUSION ADD-ON: CPT

## 2018-08-29 PROCEDURE — 96375 TX/PRO/DX INJ NEW DRUG ADDON: CPT

## 2018-08-29 PROCEDURE — 99284 EMERGENCY DEPT VISIT MOD MDM: CPT

## 2018-08-29 PROCEDURE — 96374 THER/PROPH/DIAG INJ IV PUSH: CPT

## 2018-08-29 PROCEDURE — 81025 URINE PREGNANCY TEST: CPT

## 2018-08-29 PROCEDURE — 81001 URINALYSIS AUTO W/SCOPE: CPT

## 2018-08-29 PROCEDURE — 80053 COMPREHEN METABOLIC PANEL: CPT

## 2018-08-29 PROCEDURE — 36415 COLL VENOUS BLD VENIPUNCTURE: CPT

## 2018-08-29 PROCEDURE — 85610 PROTHROMBIN TIME: CPT

## 2018-08-29 PROCEDURE — 85025 COMPLETE CBC W/AUTO DIFF WBC: CPT

## 2018-08-29 PROCEDURE — 82150 ASSAY OF AMYLASE: CPT

## 2018-08-29 NOTE — EDM.PDOC
ED HPI GENERAL MEDICAL PROBLEM





- General


Chief Complaint: General


Stated Complaint: POSSIBLE DEHYDRATION


Time Seen by Provider: 08/29/18 10:55





- History of Present Illness


INITIAL COMMENTS - FREE TEXT/NARRATIVE: 





HISTORY AND PHYSICAL:





History of present illness:


The patient is a 33-year-old female who had a long-standing history of alcohol 

use/abuse and was sober for 7 months until one week ago when she had a breakup 

with her boyfriend and drank heavily for the next 4 days stopping on Sunday, 3 

days ago. The patient said she has had withdrawal symptoms before but she feels 

differently and she feels shaky and nauseated but has not had any vomiting and 

has no discrete abdominal pain. She says she's been drinking a lot of water but 

still feels not quite herself and very off intermittent blurred vision and off-

balance but no falls. She has no specific head neck or back pain and no 

specific extremity complaints. She says she is urinating normally. She has 

never had any diagnosis or sequela from alcohol use. She currently has no 

abdominal complaints no chest pain or shortness of breath no fevers or chills. 

She is nauseated but she is not vomiting. Her bowel movements have been 

significantly decreased from normal but they're not black or bloody.





Review of systems: 


As per history of present illness and below otherwise all systems reviewed and 

negative.





Past medical history: 


As per history of present illness and as reviewed below otherwise 

noncontributory.





Surgical history: 


As per history of present illness and as reviewed below otherwise 

noncontributory.





Social history: 


No reported history of drug or alcohol abuse.





Family history: 


As per history of present illness and as reviewed below otherwise 

noncontributory.





Physical exam:


General: Well-developed well-nourished pleasant overweight female who is 

nontoxic and vital signs are reviewed by me


HEENT: Atraumatic, normocephalic, pupils reactive, negative for conjunctival 

pallor or scleral icterus, mucous membranes tacky, throat clear, neck supple, 

nontender, trachea midline.


Lungs: Clear to auscultation, breath sounds equal bilaterally, chest nontender.


Heart: S1S2, regular rhythm and slightly tachycardic rate on evaluation no 

overt murmurs


Abdomen: Soft, nondistended, nontender. Negative for masses or 

hepatosplenomegaly. Negative for costovertebral tenderness.NABS


Pelvis: Stable nontender.


Genitourinary: Deferred.


Rectal: Deferred.


Extremities: Atraumatic, negative for cords or calf pain. Neurovascular 

unremarkable.


Neuro: Awake, alert, oriented. Cranial nerves II through XII unremarkable. 

Cerebellum unremarkable. Motor and sensory unremarkable throughout. Exam 

nonfocal. The patient has slight tremulousness on my evaluation





Diagnostics:


CBC CMP INR magnesium amylase lipase UA UCG





Therapeutics:


IV fluids Zofran Pepcid Ativan





The patient feels  significantly improved and her heart rate is also improved 

on my evaluation. I discussed with her giving her a few Ativan tablets to use 

as needed and she tells me that she does have a history of anxiety and that 

with this she felt more anxious earlier. She currently feels much more restful 

but is not drowsy or sleepy. I will also give her a few Zofran in case she has 

any nausea or vomiting and have advised her to try to connect for outpatient 

follow-up for her history.





Impression: 


Very mild alcohol withdrawal symptoms improve stable





Definitive disposition and diagnosis as appropriate pending reevaluation and 

review of above.


  ** Right Flank


Pain Score (Numeric/FACES): 0





- Related Data


 Allergies











Allergy/AdvReac Type Severity Reaction Status Date / Time


 


No Known Allergies Allergy   Verified 08/29/18 10:59











Home Meds: 


 Home Meds





buPROPion [Wellbutrin SR] 150 mg PO DAILY 08/29/18 [History]











Past Medical History





- Past Health History


Medical/Surgical History: Denies Medical/Surgical History


HEENT History: Reports: None


Cardiovascular History: Reports: None


Respiratory History: Reports: None


Gastrointestinal History: Reports: None


OB/GYN History: Reports: None


Musculoskeletal History: Reports: None


Neurological History: Reports: None


Psychiatric History: Reports: Addiction, Anxiety, Depression


Other Psychiatric History: ETOH addiction.  has been to rehab twice, quit on 

own as well


Endocrine/Metabolic History: Reports: None


Oncologic (Cancer) History: Reports: None


Dermatologic History: Reports: None





- Infectious Disease History


Infectious Disease History: Reports: None





- Past Surgical History


HEENT Surgical History: Reports: None


Cardiovascular Surgical History: Reports: None


Endocrine Surgical History: Reports: None


Neurological Surgical History: Reports: None


Musculoskeletal Surgical History: Reports: None


Dermatological Surgical History: Reports: None





Social & Family History





- Family History


Family Medical History: Noncontributory


HEENT: Reports: None


Cardiac: Reports: None


Endocrine/Metabolic: Reports: None, Diabetes, Type I





- Tobacco Use


Smoking Status *Q: Current Every Day Smoker


Years of Tobacco use: 10


Packs/Tins Daily: 0.5





- Caffeine Use


Caffeine Use: Reports: Coffee





- Recreational Drug Use


Recreational Drug Use: No





ED ROS GENERAL





- Review of Systems


Review Of Systems: ROS reveals no pertinent complaints other than HPI.





ED EXAM, GENERAL





- Physical Exam


Exam: See Below (See dictation)





Course





- Vital Signs


Last Recorded V/S: 


 Last Vital Signs











Temp  36.2 C   08/29/18 12:10


 


Pulse  84   08/29/18 12:10


 


Resp  14   08/29/18 12:10


 


BP  123/79   08/29/18 12:10


 


Pulse Ox  94 L  08/29/18 12:10














- Orders/Labs/Meds


Orders: 


 Active Orders 24 hr











 Category Date Time Status


 


 HCG QUALITATIVE,URINE [URCHEM] Stat Lab  08/29/18 11:13 Ordered


 


 UA W/MICROSCOPIC [URIN] Stat Lab  08/29/18 12:08 Ordered


 


 Sodium Chloride 0.9% [Saline Flush] Med  08/29/18 11:06 Active





 10 ml FLUSH ASDIRECTED PRN   


 


 Sodium Chloride 0.9% [Saline Flush] Med  08/29/18 11:06 Active





 2.5 ml FLUSH ASDIRECTED PRN   


 


 Saline Lock Insert [OM.PC] Stat Oth  08/29/18 11:05 Ordered








 Medication Orders





Sodium Chloride (Saline Flush)  10 ml FLUSH ASDIRECTED PRN


   PRN Reason: Keep Vein Open


Sodium Chloride (Saline Flush)  2.5 ml FLUSH ASDIRECTED PRN


   PRN Reason: Keep Vein Open








Labs: 


 Laboratory Tests











  08/29/18 08/29/18 08/29/18 Range/Units





  11:13 11:16 11:16 


 


WBC   8.59   (4.0-11.0)  K/uL


 


RBC   5.35   (4.30-5.90)  M/uL


 


Hgb   16.0   (12.0-16.0)  g/dL


 


Hct   45.2   (36.0-46.0)  %


 


MCV   84.5   (80.0-98.0)  fL


 


MCH   29.9   (27.0-32.0)  pg


 


MCHC   35.4   (31.0-37.0)  g/dL


 


RDW Std Deviation   40.8   (28.0-62.0)  fl


 


RDW Coeff of Flo   14   (11.0-15.0)  %


 


Plt Count   259   (150-400)  K/uL


 


MPV   9.20   (7.40-12.00)  fL


 


Neut % (Auto)   60.5   (48.0-80.0)  %


 


Lymph % (Auto)   26.1   (16.0-40.0)  %


 


Mono % (Auto)   8.6   (0.0-15.0)  %


 


Eos % (Auto)   4.5   (0.0-7.0)  %


 


Baso % (Auto)   0.3   (0.0-1.5)  %


 


Neut # (Auto)   5.2   (1.4-5.7)  K/uL


 


Lymph # (Auto)   2.2   (0.6-2.4)  K/uL


 


Mono # (Auto)   0.7   (0.0-0.8)  K/uL


 


Eos # (Auto)   0.4   (0.0-0.7)  K/uL


 


Baso # (Auto)   0.0   (0.0-0.1)  K/uL


 


Nucleated RBC %   0.0   /100WBC


 


Nucleated RBCs #   0   K/uL


 


INR    0.98  


 


Sodium     (136-145)  mmol/L


 


Potassium     (3.5-5.1)  mmol/L


 


Chloride     ()  mmol/L


 


Carbon Dioxide     (21.0-32.0)  mmol/L


 


BUN     (7.0-18.0)  mg/dL


 


Creatinine     (0.6-1.0)  mg/dL


 


Est Cr Clr Drug Dosing     mL/min


 


Estimated GFR (MDRD)     ml/min


 


Glucose     ()  mg/dL


 


Calcium     (8.5-10.1)  mg/dL


 


Magnesium     (1.8-2.4)  mg/dL


 


Total Bilirubin     (0.2-1.0)  mg/dL


 


AST     (15-37)  IU/L


 


ALT     (14-63)  IU/L


 


Alkaline Phosphatase     ()  U/L


 


Total Protein     (6.4-8.2)  g/dL


 


Albumin     (3.4-5.0)  g/dL


 


Globulin     (2.0-3.5)  g/dL


 


Albumin/Globulin Ratio     (1.3-2.8)  


 


Amylase     ()  U/L


 


Lipase     ()  U/L


 


Urine Color     


 


Urine Appearance     


 


Urine pH     (5.0-8.0)  


 


Ur Specific Gravity     (1.001-1.035)  


 


Urine Protein     (NEGATIVE)  mg/dL


 


Urine Glucose (UA)     (NEGATIVE)  mg/dL


 


Urine Ketones     (NEGATIVE)  mg/dL


 


Urine Occult Blood     (NEGATIVE)  


 


Urine Nitrite     (NEGATIVE)  


 


Urine Bilirubin     (NEGATIVE)  


 


Urine Urobilinogen     (<2.0)  EU/dL


 


Ur Leukocyte Esterase     (NEGATIVE)  


 


Urine HCG, Qual  NEGATIVE    (NEGATIVE)  














  08/29/18 08/29/18 Range/Units





  11:16 12:08 


 


WBC    (4.0-11.0)  K/uL


 


RBC    (4.30-5.90)  M/uL


 


Hgb    (12.0-16.0)  g/dL


 


Hct    (36.0-46.0)  %


 


MCV    (80.0-98.0)  fL


 


MCH    (27.0-32.0)  pg


 


MCHC    (31.0-37.0)  g/dL


 


RDW Std Deviation    (28.0-62.0)  fl


 


RDW Coeff of Flo    (11.0-15.0)  %


 


Plt Count    (150-400)  K/uL


 


MPV    (7.40-12.00)  fL


 


Neut % (Auto)    (48.0-80.0)  %


 


Lymph % (Auto)    (16.0-40.0)  %


 


Mono % (Auto)    (0.0-15.0)  %


 


Eos % (Auto)    (0.0-7.0)  %


 


Baso % (Auto)    (0.0-1.5)  %


 


Neut # (Auto)    (1.4-5.7)  K/uL


 


Lymph # (Auto)    (0.6-2.4)  K/uL


 


Mono # (Auto)    (0.0-0.8)  K/uL


 


Eos # (Auto)    (0.0-0.7)  K/uL


 


Baso # (Auto)    (0.0-0.1)  K/uL


 


Nucleated RBC %    /100WBC


 


Nucleated RBCs #    K/uL


 


INR    


 


Sodium  135 L   (136-145)  mmol/L


 


Potassium  3.7   (3.5-5.1)  mmol/L


 


Chloride  98   ()  mmol/L


 


Carbon Dioxide  22.8   (21.0-32.0)  mmol/L


 


BUN  6 L   (7.0-18.0)  mg/dL


 


Creatinine  0.7   (0.6-1.0)  mg/dL


 


Est Cr Clr Drug Dosing  100.55   mL/min


 


Estimated GFR (MDRD)  > 60.0   ml/min


 


Glucose  135 H   ()  mg/dL


 


Calcium  9.8   (8.5-10.1)  mg/dL


 


Magnesium  1.8   (1.8-2.4)  mg/dL


 


Total Bilirubin  0.9   (0.2-1.0)  mg/dL


 


AST  46 H   (15-37)  IU/L


 


ALT  53   (14-63)  IU/L


 


Alkaline Phosphatase  106   ()  U/L


 


Total Protein  8.9 H   (6.4-8.2)  g/dL


 


Albumin  4.0   (3.4-5.0)  g/dL


 


Globulin  4.9 H   (2.0-3.5)  g/dL


 


Albumin/Globulin Ratio  0.8 L   (1.3-2.8)  


 


Amylase  34   ()  U/L


 


Lipase  127   ()  U/L


 


Urine Color   YELLOW  


 


Urine Appearance   CLEAR  


 


Urine pH   6.0  (5.0-8.0)  


 


Ur Specific Gravity   <= 1.005  (1.001-1.035)  


 


Urine Protein   TRACE  (NEGATIVE)  mg/dL


 


Urine Glucose (UA)   NEGATIVE  (NEGATIVE)  mg/dL


 


Urine Ketones   NEGATIVE  (NEGATIVE)  mg/dL


 


Urine Occult Blood   LARGE H  (NEGATIVE)  


 


Urine Nitrite   NEGATIVE  (NEGATIVE)  


 


Urine Bilirubin   NEGATIVE  (NEGATIVE)  


 


Urine Urobilinogen   0.2  (<2.0)  EU/dL


 


Ur Leukocyte Esterase   NEGATIVE  (NEGATIVE)  


 


Urine HCG, Qual    (NEGATIVE)  











Meds: 


Medications











Generic Name Dose Route Start Last Admin





  Trade Name Freq  PRN Reason Stop Dose Admin


 


Sodium Chloride  10 ml  08/29/18 11:06  





  Saline Flush  FLUSH   





  ASDIRECTED PRN   





  Keep Vein Open   





     





     





     


 


Sodium Chloride  2.5 ml  08/29/18 11:06  





  Saline Flush  FLUSH   





  ASDIRECTED PRN   





  Keep Vein Open   





     





     





     














Discontinued Medications














Generic Name Dose Route Start Last Admin





  Trade Name Freq  PRN Reason Stop Dose Admin


 


Famotidine  20 mg  08/29/18 11:06  08/29/18 11:25





  Pepcid  IVPUSH  08/29/18 11:07  20 mg





  ONETIME ONE   Administration





     





     





     





     


 


Sodium Chloride  1,000 mls @ 999 mls/hr  08/29/18 11:06  08/29/18 11:25





  Normal Saline  IV  08/29/18 12:06  999 mls/hr





  STAT ONE   Administration





     





     





     





     


 


Lorazepam  0.5 mg  08/29/18 11:21  08/29/18 11:35





  Ativan  IVPUSH  08/29/18 11:22  0.5 mg





  ONETIME ONE   Administration





     





     





     





     


 


Ondansetron HCl  4 mg  08/29/18 11:06  08/29/18 11:25





  Zofran  IVPUSH  08/29/18 11:07  4 mg





  ONETIME ONE   Administration





     





     





     





     














Departure





- Departure


Time of Disposition: 12:31


Disposition: Home, Self-Care 01


Condition: Good


Clinical Impression: 


Alcohol withdrawal


Qualifiers:


 Complication of substance-induced condition: uncomplicated Qualified Code(s): 

F10.230 - Alcohol dependence with withdrawal, uncomplicated








- Discharge Information


Referrals: 


PCP,None [Primary Care Provider] - 


Forms:  ED Department Discharge


Additional Instructions: 


The following information is given to patients seen in the emergency department 

who are being discharged to home. This information is to outline your options 

for follow-up care. We provide all patients seen in our emergency department 

with a follow-up referral.





The need for follow-up, as well as the timing and circumstances, are variable 

depending upon the specifics of your emergency department visit.





If you don't have a primary care physician on staff, we will provide you with a 

referral. We always advise you to contact your personal physician following an 

emergency department visit to inform them of the circumstance of the visit and 

for follow-up with them and/or the need for any referrals to a consulting 

specialist.





The emergency department will also refer you to a specialist when appropriate. 

This referral assures that you have the opportunity for followup care with a 

specialist. All of these measure are taken in an effort to provide you with 

optimal care, which includes your followup.





Under all circumstances we always encourage you to contact your private 

physician who remains a resource for coordinating  your care. When calling for 

followup care, please make the office aware that this follow-up is from your 

recent emergency room visit. If for any reason you are refused follow-up, 

please contact the [facility] emergency 

department at [phone] and ask to speak to the emergency department 

charge nurse.





[facility] 


Primary care- [place] [address]


[phone]








Push hydration and rest and use medications as prescribed and as needed as we 

discussed. Please call and schedule a follow-up appointment in the clinic with 

one of our providers or your provider and return to ER as needed and as 

discussed. Continue to seek outpatient resources for alcohol use and abuse and 

continue to refrain from alcohol use.





- My Orders


Last 24 Hours: 


My Active Orders





08/29/18 11:05


Saline Lock Insert [OM.PC] Stat 





08/29/18 11:06


Sodium Chloride 0.9% [Saline Flush]   10 ml FLUSH ASDIRECTED PRN 


Sodium Chloride 0.9% [Saline Flush]   2.5 ml FLUSH ASDIRECTED PRN 





08/29/18 11:13


HCG QUALITATIVE,URINE [URCHEM] Stat 





08/29/18 12:08


UA W/MICROSCOPIC [URIN] Stat 














- Assessment/Plan


Last 24 Hours: 


My Active Orders





08/29/18 11:05


Saline Lock Insert [OM.PC] Stat 





08/29/18 11:06


Sodium Chloride 0.9% [Saline Flush]   10 ml FLUSH ASDIRECTED PRN 


Sodium Chloride 0.9% [Saline Flush]   2.5 ml FLUSH ASDIRECTED PRN 





08/29/18 11:13


HCG QUALITATIVE,URINE [URCHEM] Stat 





08/29/18 12:08


UA W/MICROSCOPIC [URIN] Stat

## 2018-09-18 ENCOUNTER — HOSPITAL ENCOUNTER (EMERGENCY)
Dept: HOSPITAL 56 - MW.ED | Age: 33
Discharge: HOME | End: 2018-09-18
Payer: SELF-PAY

## 2018-09-18 VITALS — SYSTOLIC BLOOD PRESSURE: 136 MMHG | DIASTOLIC BLOOD PRESSURE: 80 MMHG

## 2018-09-18 DIAGNOSIS — F10.129: Primary | ICD-10-CM

## 2018-09-18 DIAGNOSIS — F17.210: ICD-10-CM

## 2018-09-18 DIAGNOSIS — F41.9: ICD-10-CM

## 2018-09-18 DIAGNOSIS — Z79.899: ICD-10-CM
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
## 2018-09-18 DIAGNOSIS — F32.9: ICD-10-CM

## 2018-09-18 LAB
CHLORIDE SERPL-SCNC: 98 MMOL/L (ref 98–107)
SODIUM SERPL-SCNC: 137 MMOL/L (ref 136–145)

## 2018-09-18 PROCEDURE — 80053 COMPREHEN METABOLIC PANEL: CPT

## 2018-09-18 PROCEDURE — G0480 DRUG TEST DEF 1-7 CLASSES: HCPCS

## 2018-09-18 PROCEDURE — 36415 COLL VENOUS BLD VENIPUNCTURE: CPT

## 2018-09-18 PROCEDURE — 82962 GLUCOSE BLOOD TEST: CPT

## 2018-09-18 PROCEDURE — 81001 URINALYSIS AUTO W/SCOPE: CPT

## 2018-09-18 PROCEDURE — 85025 COMPLETE CBC W/AUTO DIFF WBC: CPT

## 2018-09-18 PROCEDURE — 80305 DRUG TEST PRSMV DIR OPT OBS: CPT

## 2018-09-18 PROCEDURE — 99284 EMERGENCY DEPT VISIT MOD MDM: CPT

## 2018-09-18 NOTE — EDM.PDOCBH
ED HPI GENERAL MEDICAL PROBLEM





- General


Chief Complaint: Drug or Alcohol Abuse


Stated Complaint: ALCOHOLIC


Time Seen by Provider: 09/18/18 19:27


Source of Information: Reports: Patient


History Limitations: Reports: No Limitations, Intoxication





- History of Present Illness


INITIAL COMMENTS - FREE TEXT/NARRATIVE: 





HISTORY AND PHYSICAL:





History of present illness:


Patient is a 33-year-old female here with complaint of acute alcohol 

intoxication. Patient states that she had called a counselor because she wanted 

to quit drinking and states there was some miscommunication and the counselor 

thought she was suicidal. Police and ambulance showed up because of this. 

Patient states she does want to go to rehab for her drinking. She reports that 

she drinks 1/5 of vodka per day and that her last drink was around noon today. 

She reports she was in the ED within the last month and the only thing that 

made her seem "semi-normal" was ativan. She denies any fevers, chills, nausea, 

vomiting, abdominal pain, diarrhea, chest pain, SOB. Patient states she has had 

hallucinations in the past secondary to alcohol withdrawal. 





Review of systems: 


As per history of present illness and below otherwise all systems reviewed and 

negative.





Past medical history: 


As per history of present illness and as reviewed below otherwise 

noncontributory.





Surgical history: 


As per history of present illness and as reviewed below otherwise 

noncontributory.





Social history: 


No reported history of drug or alcohol abuse.





Family history: 


As per history of present illness and as reviewed below otherwise 

noncontributory.





Physical exam:


General: Patient sitting comfortably in no acute distress and nontoxic appearing


HEENT: Atraumatic, normocephalic, pupils reactive, negative for conjunctival 

pallor or scleral icterus, mucous membranes moist, throat clear, neck supple, 

nontender, trachea midline. No meningeal signs. 


Lungs: Clear to auscultation, breath sounds equal bilaterally, chest nontender.


Heart: S1S2, regular, negative for clicks, rubs, or overt murmur.


Abdomen: Soft, nondistended, nontender. Negative for masses or 

hepatosplenomegaly. Negative for costovertebral tenderness.


Pelvis: Stable nontender.


Genitourinary: Deferred.


Rectal: Deferred.


Extremities: Atraumatic, negative for cords or calf pain. Neurovascular 

unremarkable.


Neuro: Awake, alert, oriented. Cranial nerves II through XII unremarkable. 

Cerebellum unremarkable. Motor and sensory unremarkable throughout. Exam 

nonfocal.





Notes: Patient is not currently exhibiting any symptoms of acute alcohol 

withdrawal. Patient has a ride home with her roommate. 





Diagnostics:


CBC, CMP, UA, UDS, etoh 





Therapeutics:


1L Normal Saline IV 





Prescriptions:


None





Impression: 


Acute alcohol intoxication 





Plan:


1. Follow up with primary care provider


2. Return to ED as needed as discussed 





Definitive disposition and diagnosis as appropriate pending reevaluation and 

review of above.








- Related Data


 Allergies











Allergy/AdvReac Type Severity Reaction Status Date / Time


 


No Known Allergies Allergy   Verified 09/18/18 19:05











Home Meds: 


 Home Meds





buPROPion [Wellbutrin SR] 150 mg PO DAILY 08/29/18 [History]


Escitalopram [Lexapro] 10 mg PO DAILY 09/18/18 [History]











Past Medical History





- Past Health History


Medical/Surgical History: Denies Medical/Surgical History


HEENT History: Reports: None


Cardiovascular History: Reports: None


Respiratory History: Reports: None


Gastrointestinal History: Reports: None


OB/GYN History: Reports: None


Musculoskeletal History: Reports: None


Neurological History: Reports: None


Psychiatric History: Reports: Addiction, Anxiety, Depression


Other Psychiatric History: ETOH addiction.  has been to rehab twice, quit on 

own as well


Endocrine/Metabolic History: Reports: None


Oncologic (Cancer) History: Reports: None


Dermatologic History: Reports: None





- Infectious Disease History


Infectious Disease History: Reports: None





- Past Surgical History


HEENT Surgical History: Reports: None


Cardiovascular Surgical History: Reports: None


Endocrine Surgical History: Reports: None


Neurological Surgical History: Reports: None


Musculoskeletal Surgical History: Reports: None


Dermatological Surgical History: Reports: None





Social & Family History





- Family History


Family Medical History: Noncontributory


HEENT: Reports: None


Cardiac: Reports: None


Endocrine/Metabolic: Reports: None, Diabetes, Type I





- Tobacco Use


Smoking Status *Q: Current Every Day Smoker


Years of Tobacco use: 10


Packs/Tins Daily: 1





- Caffeine Use


Caffeine Use: Reports: Coffee





- Alcohol Use


Days Per Week of Alcohol Use: 7


Number of Drinks Per Day: 10


Total Drinks Per Week: 70





- Recreational Drug Use


Recreational Drug Use: Yes


Drug Use in Last 12 Months: Yes


Recreational Drug Type: Reports: Other (see below)


Other Recreational Drug Type: muscle relaxers she gets from a friend for pain


Recreational Drug Use Frequency: Daily





ED ROS GENERAL





- Review of Systems


Review Of Systems: ROS reveals no pertinent complaints other than HPI.





ED EXAM, BEHAVIORAL HEALTH





- Physical Exam


Exam: See Below (see dictation)





COURSE, BEHAVIORAL HEALTH COMP





- Course


Vital Signs: 


 Last Vital Signs











Temp  37.0 C   09/18/18 19:06


 


Pulse  136 H  09/18/18 19:06


 


Resp  25 H  09/18/18 19:06


 


BP  181/113 H  09/18/18 19:06


 


Pulse Ox  95   09/18/18 19:06











Orders, Labs, Meds: 


 Laboratory Tests











  09/18/18 09/18/18 09/18/18 Range/Units





  17:26 17:26 18:52 


 


WBC  10.04    (4.0-11.0)  K/uL


 


RBC  5.12    (4.30-5.90)  M/uL


 


Hgb  15.4    (12.0-16.0)  g/dL


 


Hct  44.1    (36.0-46.0)  %


 


MCV  86.1    (80.0-98.0)  fL


 


MCH  30.1    (27.0-32.0)  pg


 


MCHC  34.9    (31.0-37.0)  g/dL


 


RDW Std Deviation  43.7    (28.0-62.0)  fl


 


RDW Coeff of Flo  14    (11.0-15.0)  %


 


Plt Count  224    (150-400)  K/uL


 


MPV  9.20    (7.40-12.00)  fL


 


Neut % (Auto)  65.5    (48.0-80.0)  %


 


Lymph % (Auto)  25.2    (16.0-40.0)  %


 


Mono % (Auto)  7.9    (0.0-15.0)  %


 


Eos % (Auto)  1.1    (0.0-7.0)  %


 


Baso % (Auto)  0.3    (0.0-1.5)  %


 


Neut # (Auto)  6.6 H    (1.4-5.7)  K/uL


 


Lymph # (Auto)  2.5 H    (0.6-2.4)  K/uL


 


Mono # (Auto)  0.8    (0.0-0.8)  K/uL


 


Eos # (Auto)  0.1    (0.0-0.7)  K/uL


 


Baso # (Auto)  0.0    (0.0-0.1)  K/uL


 


Nucleated RBC %  0.0    /100WBC


 


Nucleated RBCs #  0    K/uL


 


Sodium   137   (136-145)  mmol/L


 


Potassium   3.7   (3.5-5.1)  mmol/L


 


Chloride   98   ()  mmol/L


 


Carbon Dioxide   22.3   (21.0-32.0)  mmol/L


 


BUN   4 L   (7.0-18.0)  mg/dL


 


Creatinine   0.7   (0.6-1.0)  mg/dL


 


Est Cr Clr Drug Dosing   100.55   mL/min


 


Estimated GFR (MDRD)   > 60.0   ml/min


 


Glucose   91   ()  mg/dL


 


POC Glucose    85  ()  mg/dL


 


Calcium   8.6   (8.5-10.1)  mg/dL


 


Total Bilirubin   0.5   (0.2-1.0)  mg/dL


 


AST   56 H   (15-37)  IU/L


 


ALT   59   (14-63)  IU/L


 


Alkaline Phosphatase   92   ()  U/L


 


Total Protein   7.9   (6.4-8.2)  g/dL


 


Albumin   3.8   (3.4-5.0)  g/dL


 


Globulin   4.1 H   (2.0-3.5)  g/dL


 


Albumin/Globulin Ratio   0.9 L   (1.3-2.8)  


 


Urine Color     


 


Urine Appearance     


 


Urine pH     (5.0-8.0)  


 


Ur Specific Gravity     (1.001-1.035)  


 


Urine Protein     (NEGATIVE)  mg/dL


 


Urine Glucose (UA)     (NEGATIVE)  mg/dL


 


Urine Ketones     (NEGATIVE)  mg/dL


 


Urine Occult Blood     (NEGATIVE)  


 


Urine Nitrite     (NEGATIVE)  


 


Urine Bilirubin     (NEGATIVE)  


 


Urine Urobilinogen     (<2.0)  EU/dL


 


Ur Leukocyte Esterase     (NEGATIVE)  


 


Urine RBC     (0-2/HPF)  


 


Urine WBC     (0-5/HPF)  


 


Ur Epithelial Cells     (NONE-FEW)  


 


Urine Bacteria     (NEGATIVE)  


 


Urine Opiates Screen     (NEGATIVE)  


 


Ur Oxycodone Screen     (NEGATIVE)  


 


Urine Methadone Screen     (NEGATIVE)  


 


Ur Barbiturates Screen     (NEGATIVE)  


 


Ur Phencyclidine Scrn     (NEGATIVE)  


 


Ur Amphetamine Screen     (NEGATIVE)  


 


U Methamphetamines Scrn     (NEGATIVE)  


 


U Benzodiazepines Scrn     (NEGATIVE)  


 


U Cocaine Metab Screen     (NEGATIVE)  


 


U Marijuana (THC) Screen     (NEGATIVE)  


 


Ethyl Alcohol   268   mg/dL














  09/18/18 09/18/18 Range/Units





  19:42 19:42 


 


WBC    (4.0-11.0)  K/uL


 


RBC    (4.30-5.90)  M/uL


 


Hgb    (12.0-16.0)  g/dL


 


Hct    (36.0-46.0)  %


 


MCV    (80.0-98.0)  fL


 


MCH    (27.0-32.0)  pg


 


MCHC    (31.0-37.0)  g/dL


 


RDW Std Deviation    (28.0-62.0)  fl


 


RDW Coeff of Flo    (11.0-15.0)  %


 


Plt Count    (150-400)  K/uL


 


MPV    (7.40-12.00)  fL


 


Neut % (Auto)    (48.0-80.0)  %


 


Lymph % (Auto)    (16.0-40.0)  %


 


Mono % (Auto)    (0.0-15.0)  %


 


Eos % (Auto)    (0.0-7.0)  %


 


Baso % (Auto)    (0.0-1.5)  %


 


Neut # (Auto)    (1.4-5.7)  K/uL


 


Lymph # (Auto)    (0.6-2.4)  K/uL


 


Mono # (Auto)    (0.0-0.8)  K/uL


 


Eos # (Auto)    (0.0-0.7)  K/uL


 


Baso # (Auto)    (0.0-0.1)  K/uL


 


Nucleated RBC %    /100WBC


 


Nucleated RBCs #    K/uL


 


Sodium    (136-145)  mmol/L


 


Potassium    (3.5-5.1)  mmol/L


 


Chloride    ()  mmol/L


 


Carbon Dioxide    (21.0-32.0)  mmol/L


 


BUN    (7.0-18.0)  mg/dL


 


Creatinine    (0.6-1.0)  mg/dL


 


Est Cr Clr Drug Dosing    mL/min


 


Estimated GFR (MDRD)    ml/min


 


Glucose    ()  mg/dL


 


POC Glucose    ()  mg/dL


 


Calcium    (8.5-10.1)  mg/dL


 


Total Bilirubin    (0.2-1.0)  mg/dL


 


AST    (15-37)  IU/L


 


ALT    (14-63)  IU/L


 


Alkaline Phosphatase    ()  U/L


 


Total Protein    (6.4-8.2)  g/dL


 


Albumin    (3.4-5.0)  g/dL


 


Globulin    (2.0-3.5)  g/dL


 


Albumin/Globulin Ratio    (1.3-2.8)  


 


Urine Color  YELLOW   


 


Urine Appearance  SLT CLOUDY   


 


Urine pH  6.0   (5.0-8.0)  


 


Ur Specific Gravity  >= 1.030   (1.001-1.035)  


 


Urine Protein  100   (NEGATIVE)  mg/dL


 


Urine Glucose (UA)  NEGATIVE   (NEGATIVE)  mg/dL


 


Urine Ketones  40 H   (NEGATIVE)  mg/dL


 


Urine Occult Blood  MODERATE   (NEGATIVE)  


 


Urine Nitrite  NEGATIVE   (NEGATIVE)  


 


Urine Bilirubin  NEGATIVE   (NEGATIVE)  


 


Urine Urobilinogen  0.2   (<2.0)  EU/dL


 


Ur Leukocyte Esterase  NEGATIVE   (NEGATIVE)  


 


Urine RBC  0-2   (0-2/HPF)  


 


Urine WBC  1-3   (0-5/HPF)  


 


Ur Epithelial Cells  MODERATE   (NONE-FEW)  


 


Urine Bacteria  FEW   (NEGATIVE)  


 


Urine Opiates Screen   NEGATIVE  (NEGATIVE)  


 


Ur Oxycodone Screen   NEGATIVE  (NEGATIVE)  


 


Urine Methadone Screen   NEGATIVE  (NEGATIVE)  


 


Ur Barbiturates Screen   NEGATIVE  (NEGATIVE)  


 


Ur Phencyclidine Scrn   NEGATIVE  (NEGATIVE)  


 


Ur Amphetamine Screen   NEGATIVE  (NEGATIVE)  


 


U Methamphetamines Scrn   NEGATIVE  (NEGATIVE)  


 


U Benzodiazepines Scrn   NEGATIVE  (NEGATIVE)  


 


U Cocaine Metab Screen   NEGATIVE  (NEGATIVE)  


 


U Marijuana (THC) Screen   NEGATIVE  (NEGATIVE)  


 


Ethyl Alcohol    mg/dL








Medications














Discontinued Medications














Generic Name Dose Route Start Last Admin





  Trade Name Freq  PRN Reason Stop Dose Admin


 


Sodium Chloride  1,000 mls @ 999 mls/hr  09/18/18 19:09  09/18/18 20:00





  Normal Saline  IV  09/18/18 20:09  999 mls/hr





  STAT ONE   Administration





     





     





     





     














Departure





- Departure


Time of Disposition: 20:11


Disposition: Home, Self-Care 01


Condition: Good


Clinical Impression: 


 Acute alcohol intoxication








- Discharge Information


Referrals: 


PCP,None [Primary Care Provider] - 


Forms:  ED Department Discharge


Additional Instructions: 


The following information is given to patients seen in the emergency department 

who are being discharged to home. This information is to outline your options 

for follow-up care. We provide all patients seen in our emergency department 

with a follow-up referral.





The need for follow-up, as well as the timing and circumstances, are variable 

depending upon the specifics of your emergency department visit.





If you don't have a primary care physician on staff, we will provide you with a 

referral. We always advise you to contact your personal physician following an 

emergency department visit to inform them of the circumstance of the visit and 

for follow-up with them and/or the need for any referrals to a consulting 

specialist.





The emergency department will also refer you to a specialist when appropriate. 

This referral assures that you have the opportunity for follow-up care with a 

specialist. All of these measure are taken in an effort to provide you with 

optimal care, which includes your follow-up.





Under all circumstances we always encourage you to contact your private 

physician who remains a resource for coordinating your care. When calling for 

follow-up care, please make the office aware that this follow-up is from your 

recent emergency room visit. If for any reason you are refused follow-up, 

please contact the [facility] Emergency 

Department at [phone] and asked to speak to the emergency department 

charge nurse.





[facility]


Primary Care


[address] [place] [id number]


Phone: [phone]


Fax: [phone]





1. Follow up with primary care provider


2. Return to ED as needed as discussed

## 2018-09-23 ENCOUNTER — HOSPITAL ENCOUNTER (EMERGENCY)
Dept: HOSPITAL 56 - MW.ED | Age: 33
Discharge: HOME | End: 2018-09-23
Payer: COMMERCIAL

## 2018-09-23 VITALS — DIASTOLIC BLOOD PRESSURE: 77 MMHG | SYSTOLIC BLOOD PRESSURE: 148 MMHG

## 2018-09-23 DIAGNOSIS — F17.210: ICD-10-CM

## 2018-09-23 DIAGNOSIS — F41.9: ICD-10-CM

## 2018-09-23 DIAGNOSIS — F10.20: Primary | ICD-10-CM

## 2018-09-23 DIAGNOSIS — E87.6: ICD-10-CM

## 2018-09-23 DIAGNOSIS — Z79.899: ICD-10-CM

## 2018-09-23 LAB
CHLORIDE SERPL-SCNC: 100 MMOL/L (ref 98–107)
SODIUM SERPL-SCNC: 137 MMOL/L (ref 136–145)

## 2018-09-23 PROCEDURE — 99285 EMERGENCY DEPT VISIT HI MDM: CPT

## 2018-09-23 PROCEDURE — 36415 COLL VENOUS BLD VENIPUNCTURE: CPT

## 2018-09-23 PROCEDURE — 81001 URINALYSIS AUTO W/SCOPE: CPT

## 2018-09-23 PROCEDURE — 80053 COMPREHEN METABOLIC PANEL: CPT

## 2018-09-23 PROCEDURE — 96361 HYDRATE IV INFUSION ADD-ON: CPT

## 2018-09-23 PROCEDURE — 96374 THER/PROPH/DIAG INJ IV PUSH: CPT

## 2018-09-23 PROCEDURE — 85025 COMPLETE CBC W/AUTO DIFF WBC: CPT

## 2018-09-23 NOTE — EDM.PDOC
ED HPI GENERAL MEDICAL PROBLEM





- General


Chief Complaint: Drug or Alcohol Abuse


Stated Complaint: UNKNOWN


Time Seen by Provider: 09/23/18 18:43





- History of Present Illness


INITIAL COMMENTS - FREE TEXT/NARRATIVE: 


HISTORY AND PHYSICAL:





History of present illness:


Patient is a 33-year-old white female history of ethanol abuse who presents 

with a concern of hyperventilation and anxiety patient is awaiting placement 

for her alcohol dependence. She denies fever chills nausea vomiting or other 

complaints she is markedly improved upon arrival here and feels that she became 

anxious with regard to the totality of the circumstances and her situation with 

alcohol dependence/abuse





Review of systems: 


As per history of present illness and below otherwise all systems reviewed and 

negative.





Past medical history: 


As per history of present illness and as reviewed below otherwise 

noncontributory.





Surgical history: 


As per history of present illness and as reviewed below otherwise 

noncontributory.





Social history: 


No reported history of drug or alcohol abuse.





Family history: 


As per history of present illness and as reviewed below otherwise 

noncontributory.





Physical exam:


HEENT: Atraumatic, normocephalic, pupils reactive, negative for conjunctival 

pallor or scleral icterus, mucous membranes moist, throat clear, neck supple, 

nontender, trachea midline.


Lungs: Clear to auscultation, breath sounds equal bilaterally, chest nontender.


Heart: S1S2, regular, negative for clicks, rubs, or JVD.


Abdomen: Soft, nondistended, nontender. Negative for masses or 

hepatosplenomegaly. Negative for costovertebral tenderness.


Pelvis: Stable nontender.


Genitourinary: Deferred.


Rectal: Deferred.


Extremities: Atraumatic, negative for cords or calf pain. Neurovascular 

unremarkable.


Neuro: Awake, alert, oriented. Cranial nerves II through XII unremarkable. 

Cerebellum unremarkable. Motor and sensory unremarkable throughout. Exam 

nonfocal.





Diagnostics:


CBC CMP UA





Therapeutics:


None





Impression: 


#1 anxiety #2 history of ethanol abuse/dependence





Definitive disposition and diagnosis as appropriate pending reevaluation and 

review of above.








- Related Data


 Allergies











Allergy/AdvReac Type Severity Reaction Status Date / Time


 


No Known Allergies Allergy   Verified 09/23/18 18:40











Home Meds: 


 Home Meds





buPROPion [Wellbutrin SR] 150 mg PO DAILY 08/29/18 [History]


Escitalopram [Lexapro] 10 mg PO DAILY 09/18/18 [History]











Past Medical History





- Past Health History


Medical/Surgical History: Denies Medical/Surgical History


HEENT History: Reports: None


Cardiovascular History: Reports: None


Respiratory History: Reports: None


Gastrointestinal History: Reports: None


OB/GYN History: Reports: None


Musculoskeletal History: Reports: None


Neurological History: Reports: None


Psychiatric History: Reports: Addiction, Anxiety, Depression


Other Psychiatric History: ETOH addiction.  has been to rehab twice, quit on 

own as well


Endocrine/Metabolic History: Reports: None


Oncologic (Cancer) History: Reports: None


Dermatologic History: Reports: None





- Infectious Disease History


Infectious Disease History: Reports: Chicken Pox





- Past Surgical History


HEENT Surgical History: Reports: None


Cardiovascular Surgical History: Reports: None


Endocrine Surgical History: Reports: None


Neurological Surgical History: Reports: None


Musculoskeletal Surgical History: Reports: None


Dermatological Surgical History: Reports: None





Social & Family History





- Family History


Family Medical History: Noncontributory


HEENT: Reports: None


Cardiac: Reports: None


Endocrine/Metabolic: Reports: None, Diabetes, Type I





- Tobacco Use


Smoking Status *Q: Current Every Day Smoker


Years of Tobacco use: 10


Packs/Tins Daily: 1





- Caffeine Use


Caffeine Use: Reports: Coffee





- Recreational Drug Use


Recreational Drug Use: No





ED ROS GENERAL





- Review of Systems


Review Of Systems: ROS reveals no pertinent complaints other than HPI.





ED EXAM, GENERAL





- Physical Exam


Exam: See Below (The dictation)





Course





- Vital Signs


Last Recorded V/S: 





 Last Vital Signs











Temp  36.2 C   09/23/18 18:35


 


Pulse  108 H  09/23/18 18:35


 


Resp  20   09/23/18 18:35


 


BP  147/82 H  09/23/18 18:35


 


Pulse Ox  97   09/23/18 18:35














- Orders/Labs/Meds


Orders: 





 Active Orders 24 hr











 Category Date Time Status


 


 CBC WITH AUTO DIFF [HEME] Stat Lab  09/23/18 18:38 Ordered


 


 COMPREHENSIVE METABOLIC PN,CMP [CHEM] Stat Lab  09/23/18 18:38 Ordered


 


 UA W/MICROSCOPIC [URIN] Stat Lab  09/23/18 18:38 Ordered


 


 Sodium Chloride 0.9% [Normal Saline] 1,000 ml Med  09/23/18 18:38 Active





 IV STAT   








 Medication Orders





Sodium Chloride (Normal Saline)  1,000 mls @ 999 mls/hr IV STAT ONE


   Stop: 09/23/18 19:38








Meds: 





Medications











Generic Name Dose Route Start Last Admin





  Trade Name Freq  PRN Reason Stop Dose Admin


 


Sodium Chloride  1,000 mls @ 999 mls/hr  09/23/18 18:38  





  Normal Saline  IV  09/23/18 19:38  





  STAT ONE   





     





     





     





     














Discontinued Medications














Generic Name Dose Route Start Last Admin





  Trade Name Freq  PRN Reason Stop Dose Admin


 


Ondansetron HCl  4 mg  09/23/18 18:38  





  Zofran  IVPUSH  09/23/18 18:39  





  ONETIME ONE   





     





     





     





     














Departure





- Departure


Time of Disposition: 18:45


Disposition: Home, Self-Care 01


Condition: Good


Clinical Impression: 


 Alcohol abuse, Anxiety








- Discharge Information


*PRESCRIPTION DRUG MONITORING PROGRAM REVIEWED*: Not Applicable


*COPY OF PRESCRIPTION DRUG MONITORING REPORT IN PATIENT MIGUEL: Not Applicable


Additional Instructions: 


The following information is given to patients seen in the emergency department 

who are being discharged to home. This information is to outline your options 

for follow-up care. We provide all patients seen in our emergency department 

with a follow-up referral.





The need for follow-up, as well as the timing and circumstances, are variable 

depending upon the specifics of your emergency department visit.





If you don't have a primary care physician on staff, we will provide you with a 

referral. We always advise you to contact your personal physician following an 

emergency department visit to inform them of the circumstance of the visit and 

for follow-up with them and/or the need for any referrals to a consulting 

specialist.





The emergency department will also refer you to a specialist when appropriate. 

This referral assures that you have the opportunity for followup care with a 

specialist. All of these measure are taken in an effort to provide you with 

optimal care, which includes your followup.





Under all circumstances we always encourage you to contact your private 

physician who remains a resource for coordinating  your care. When calling for 

followup care, please make the office aware that this follow-up is from your 

recent emergency room visit. If for any reason you are refused follow-up, 

please contact the [facility] emergency department at [phone] 

and asked to speak to the emergency department charge nurse.














Follow-up for alcohol treatment asked scheduled as discussed return as needed 

as discussed

## 2019-08-10 ENCOUNTER — HOSPITAL ENCOUNTER (EMERGENCY)
Dept: HOSPITAL 56 - MW.ED | Age: 34
Discharge: HOME | End: 2019-08-10
Payer: SELF-PAY

## 2019-08-10 VITALS — SYSTOLIC BLOOD PRESSURE: 151 MMHG | DIASTOLIC BLOOD PRESSURE: 98 MMHG | HEART RATE: 94 BPM

## 2019-08-10 DIAGNOSIS — J44.9: Primary | ICD-10-CM

## 2019-08-10 DIAGNOSIS — F17.210: ICD-10-CM

## 2019-08-10 PROCEDURE — 71046 X-RAY EXAM CHEST 2 VIEWS: CPT

## 2019-08-10 PROCEDURE — 94640 AIRWAY INHALATION TREATMENT: CPT

## 2019-08-10 PROCEDURE — 99285 EMERGENCY DEPT VISIT HI MDM: CPT

## 2019-08-10 NOTE — EDM.PDOC
ED HPI GENERAL MEDICAL PROBLEM





- General


Chief Complaint: Respiratory Problem


Stated Complaint: TROUBLE BREATHING, COUGH


Time Seen by Provider: 08/10/19 13:48


Source of Information: Reports: Patient


History Limitations: Reports: No Limitations





- History of Present Illness


INITIAL COMMENTS - FREE TEXT/NARRATIVE: 


History of present illness:


[]Patient smokes pack and half a day and has been short of breath for the past 

year. It has as worsened in  the past month. He does not use any inhalers and 

is currently not on any steroids.





Review of systems: 


As per history of present illness and below otherwise all systems reviewed and 

negative.





Past medical history: 


As per history of present illness and as reviewed below otherwise 

noncontributory.





Surgical history: 


As per history of present illness and as reviewed below otherwise 

noncontributory.





Social history: 


No reported history of drug or alcohol abuse.





Family history: 


As per history of present illness and as reviewed below otherwise 

noncontributory.





Physical exam:


General: Well developed, well nourished in NAD


HEENT: Atraumatic, normocephalic, pupils reactive, negative for conjunctival 

pallor or scleral icterus, mucous membranes moist, throat clear, neck supple, 

nontender, trachea midline.


Lungs: Bilateral wheezing to auscultation, breath sounds equal bilaterally, 

chest nontender. No respiratory distress


Heart: S1S2, regular, negative for clicks, rubs, or JVD.


Abdomen: NABS, Soft, nondistended, nontender. Negative for masses or 

hepatosplenomegaly. Negative for costovertebral tenderness.


Pelvis: Stable nontender.


Genitourinary: Deferred.


Rectal: Deferred.


Extremities: Atraumatic, negative for cords or calf pain. Neurovascular 

unremarkable.


Neuro: Awake, alert, oriented. Cranial nerves II through XII unremarkable. 

Cerebellum unremarkable. Motor and sensory unremarkable throughout. Exam 

nonfocal.


Skin:warm and dry





Diagnostics:


Chest x-ray





Therapeutics:


DuoNeb, prednisone





ED Course:


Improved





Impression: 


Asthmatic bronchitis





Prescriptions:


Prednisone, albuterol inhaler





Plan:


Smoking, alcohol





Definitive disposition and diagnosis as appropriate pending reevaluation and 

review of above.





  ** Back


Pain Score (Numeric/FACES): 6





- Related Data


 Allergies











Allergy/AdvReac Type Severity Reaction Status Date / Time


 


No Known Allergies Allergy   Verified 08/10/19 13:54











Home Meds: 


 Home Meds





Albuterol [Ventolin HFA] 2 puff INH Q4HR PRN #1 inhaler 08/10/19 [Rx]


predniSONE [Prednisone] 20 mg PO DAILY #5 tablet 08/10/19 [Rx]











Past Medical History





- Past Health History


Medical/Surgical History: Denies Medical/Surgical History


HEENT History: Reports: None


Cardiovascular History: Reports: None


Respiratory History: Reports: None


Gastrointestinal History: Reports: None


OB/GYN History: Reports: None


Musculoskeletal History: Reports: None


Neurological History: Reports: None


Psychiatric History: Reports: Addiction, Anxiety, Depression


Other Psychiatric History: ETOH addiction.  has been to rehab twice, quit on 

own as well


Endocrine/Metabolic History: Reports: None


Oncologic (Cancer) History: Reports: None


Dermatologic History: Reports: None





- Infectious Disease History


Infectious Disease History: Reports: Chicken Pox





- Past Surgical History


HEENT Surgical History: Reports: None


Cardiovascular Surgical History: Reports: None


Endocrine Surgical History: Reports: None


Neurological Surgical History: Reports: None


Musculoskeletal Surgical History: Reports: None


Dermatological Surgical History: Reports: None





Social & Family History





- Family History


Family Medical History: Noncontributory


HEENT: Reports: None


Cardiac: Reports: None


Endocrine/Metabolic: Reports: None, Diabetes, Type I





- Tobacco Use


Smoking Status *Q: Current Every Day Smoker


Years of Tobacco use: 12


Packs/Tins Daily: 1.5





- Caffeine Use


Caffeine Use: Reports: Coffee





- Recreational Drug Use


Recreational Drug Use: No





ED ROS GENERAL





- Review of Systems


Review Of Systems: See Below





ED EXAM, GENERAL





- Physical Exam


Exam: See Below





Course





- Vital Signs


Last Recorded V/S: 


 Last Vital Signs











Temp  97.5 F   08/10/19 13:54


 


Pulse  94   08/10/19 13:54


 


Resp  19   08/10/19 13:54


 


BP  151/98 H  08/10/19 13:54


 


Pulse Ox  94 L  08/10/19 13:54














- Orders/Labs/Meds


Orders: 


 Active Orders 24 hr











 Category Date Time Status


 


 RT Aerosol Therapy [RC] ASDIRECTED Care  08/10/19 14:05 Active


 


 Chest 2V [CR] Stat Exams  08/10/19 14:04 Taken











Meds: 


Medications














Discontinued Medications














Generic Name Dose Route Start Last Admin





  Trade Name Freq  PRN Reason Stop Dose Admin


 


Albuterol/Ipratropium  3 ml  08/10/19 14:04  08/10/19 14:14





  Duoneb 3.0-0.5 Mg/3 Ml  NEB  08/10/19 14:05  3 ml





  ONETIME ONE   Administration





     





     





     





     


 


Prednisone  60 mg  08/10/19 14:04  





  Prednisone  PO  08/10/19 14:05  





  ONETIME ONE   





     





     





     





     














Departure





- Departure


Time of Disposition: 14:47


Disposition: Home, Self-Care 01


Condition: Good


Clinical Impression: 


 Asthmatic bronchitis , chronic








- Discharge Information


*PRESCRIPTION DRUG MONITORING PROGRAM REVIEWED*: No


*COPY OF PRESCRIPTION DRUG MONITORING REPORT IN PATIENT MIGUEL: No


Prescriptions: 


Albuterol [Ventolin HFA] 2 puff INH Q4HR PRN #1 inhaler


 PRN Reason: Shortness Of Breath


predniSONE [Prednisone] 20 mg PO DAILY #5 tablet


Referrals: 


PCP,Unknown [Primary Care Provider] - 


Forms:  ED Department Discharge


Additional Instructions: 


The following information is given to patients seen in the emergency department 

who are being discharged to home. This information is to outline your options 

for follow-up care. We provide all patients seen in our emergency department 

with a follow-up referral.





The need for follow-up, as well as the timing and circumstances, are variable 

depending upon the specifics of your emergency department visit.





If you don't have a primary care physician on staff, we will provide you with a 

referral. We always advise you to contact your personal physician following an 

emergency department visit to inform them of the circumstance of the visit and 

for follow-up with them and/or the need for any referrals to a consulting 

specialist.





The emergency department will also refer you to a specialist when appropriate. 

This referral assures that you have the opportunity for follow-up care with a 

specialist. All of these measure are taken in an effort to provide you with 

optimal care, which includes your follow-up.





Under all circumstances we always encourage you to contact your private 

physician who remains a resource for coordinating your care. When calling for 

follow-up care, please make the office aware that this follow-up is from your 

recent emergency room visit. If for any reason you are refused follow-up, 

please contact the [facility] Emergency 

Department at [phone] and asked to speak to the emergency department 

charge nurse.





Stop smoking, Take meds as directed, follow up with your primary care physician

, return to ER if symptoms worsen or change.





[facility]


Primary [address] [address]


Phone: [phone]


Fax: [phone]





- My Orders


Last 24 Hours: 


My Active Orders





08/10/19 14:04


Chest 2V [CR] Stat 





08/10/19 14:05


RT Aerosol Therapy [RC] ASDIRECTED 














- Assessment/Plan


Last 24 Hours: 


My Active Orders





08/10/19 14:04


Chest 2V [CR] Stat 





08/10/19 14:05


RT Aerosol Therapy [RC] ASDIRECTED

## 2019-08-10 NOTE — CR
Indication:



Cough for year.



Technique:



PA and lateral views the chest were obtained.



Comparison:



None



Findings:



The heart is normal in size. The lungs are clear. No infiltrate, pleural 

effusion, or pneumothorax is identified.



Impression:



No acute cardiopulmonary process.



Dictated by Lili Lewis MD @ Aug 10 2019  3:05PM



Signed by Dr. Lili Lewis @ Aug 10 2019  3:07PM

## 2019-09-03 ENCOUNTER — HOSPITAL ENCOUNTER (EMERGENCY)
Dept: HOSPITAL 56 - MW.ED | Age: 34
LOS: 1 days | Discharge: HOME | End: 2019-09-04
Payer: SELF-PAY

## 2019-09-03 DIAGNOSIS — R74.0: ICD-10-CM

## 2019-09-03 DIAGNOSIS — K52.9: Primary | ICD-10-CM

## 2019-09-03 DIAGNOSIS — F17.210: ICD-10-CM

## 2019-09-03 DIAGNOSIS — Z79.51: ICD-10-CM

## 2019-09-03 LAB
BUN SERPL-MCNC: 10 MG/DL (ref 7–18)
CHLORIDE SERPL-SCNC: 97 MMOL/L (ref 98–107)
CO2 SERPL-SCNC: 23.3 MMOL/L (ref 21–32)
GLUCOSE SERPL-MCNC: 123 MG/DL (ref 74–106)
LIPASE SERPL-CCNC: 162 U/L (ref 73–393)
POTASSIUM SERPL-SCNC: 3.8 MMOL/L (ref 3.5–5.1)
SODIUM SERPL-SCNC: 136 MMOL/L (ref 136–145)

## 2019-09-03 PROCEDURE — 80053 COMPREHEN METABOLIC PANEL: CPT

## 2019-09-03 PROCEDURE — 76705 ECHO EXAM OF ABDOMEN: CPT

## 2019-09-03 PROCEDURE — 81001 URINALYSIS AUTO W/SCOPE: CPT

## 2019-09-03 PROCEDURE — 96361 HYDRATE IV INFUSION ADD-ON: CPT

## 2019-09-03 PROCEDURE — 96374 THER/PROPH/DIAG INJ IV PUSH: CPT

## 2019-09-03 PROCEDURE — 71045 X-RAY EXAM CHEST 1 VIEW: CPT

## 2019-09-03 PROCEDURE — 36415 COLL VENOUS BLD VENIPUNCTURE: CPT

## 2019-09-03 PROCEDURE — 99284 EMERGENCY DEPT VISIT MOD MDM: CPT

## 2019-09-03 PROCEDURE — 74177 CT ABD & PELVIS W/CONTRAST: CPT

## 2019-09-03 PROCEDURE — 96375 TX/PRO/DX INJ NEW DRUG ADDON: CPT

## 2019-09-03 PROCEDURE — 84703 CHORIONIC GONADOTROPIN ASSAY: CPT

## 2019-09-03 PROCEDURE — C9113 INJ PANTOPRAZOLE SODIUM, VIA: HCPCS

## 2019-09-03 PROCEDURE — 83690 ASSAY OF LIPASE: CPT

## 2019-09-03 PROCEDURE — 85025 COMPLETE CBC W/AUTO DIFF WBC: CPT

## 2019-09-03 NOTE — CR
INDICATION:



Chest pain.



TECHNIQUE:



One view chest.



COMPARISON:



08/10/2019.



FINDINGS:



The lungs are clear without pneumothorax. The hilar, cardiac and 

mediastinal contours are stable. No pleural effusions or pulmonary vascular 

congestion. 



Impression:



No acute radiographic chest finding.



Dictated by Jose Carroll MD @ Sep  3 2019  9:53PM



Signed by Dr. Jose Carroll @ Sep  3 2019  9:55PM

## 2019-09-03 NOTE — EDM.PDOC
ED HPI GENERAL MEDICAL PROBLEM





- General


Chief Complaint: Gastrointestinal Problem


Stated Complaint: THROWING UP


Time Seen by Provider: 09/03/19 19:43


Source of Information: Reports: Patient


History Limitations: Reports: No Limitations





- History of Present Illness


INITIAL COMMENTS - FREE TEXT/NARRATIVE: 


HISTORY AND PHYSICAL:





History of present illness:


Patient is a 34-year-old female presents to the ED today with concern of 

generalized abdominal pain, vomiting, and diarrhea 2-3 days. Patient states 

she has not been able to eat or drink due to the symptoms and any time she does 

try to eat or drink anything she vomits it up and is worsened abdominal pain. 

Patient states every time she gets the abdominal pain she has an episode of 

diarrhea. Patient denies any abdominal surgeries but states she does have a 

history of chronic alcohol abuse although states she has this under control, 

she states that she has not had any alcohol today. Patient states she also does 

smoke about a pack and a half a day and has so for 10 years. Patient denies any 

other health history or any other symptoms or concerns. Patient states she does 

have a chronic cough but her cough is unchanged from her baseline.





Patient denies fever, chills, chest pain, shortness of breath. Denies headache, 

neck stiff ness, change in vision, syncope, or near syncope. Denies dysuria. 

Has not noted any blood in urine or stool.





Review of systems: 


As per history of present illness and below otherwise all systems reviewed and 

negative.





Past medical history: 


As per history of present illness and as reviewed below otherwise 

noncontributory.





Surgical history: 


As per history of present illness and as reviewed below otherwise 

noncontributory.





Social history: 


See social history for further information





Family history: 


As per history of present illness and as reviewed below otherwise 

noncontributory.





Physical exam:


General: Patient is alert, oriented, and in no acute distress. Patient laying 

comfortably on exam table.


HEENT: Atraumatic, normocephalic, pupils equal and reactive bilaterally, 

negative for conjunctival pallor or scleral icterus, mucous membranes dry, TMs 

normal bilaterally, throat clear, neck supple, nontender, trachea midline. No 

drooling or trismus noted. No meningeal signs. No hot potato voice noted. 


Lungs: Clear to auscultation, breath sounds equal bilaterally, chest nontender.


Heart: S1S2, regular rate and rhythm without overt murmur


Abdomen: Soft, nondistended. Generalized moderate to severe pain to palpation 

without guarding. Negative for masses or hepatosplenomegaly. Negative for 

costovertebral tenderness.


Pelvis: Stable nontender.


Genitourinary: Deferred.


Rectal: Deferred.


Skin: Intact, warm, dry. No lesions or rashes noted.


Extremities: Atraumatic, negative for cords or calf pain. Neurovascular 

unremarkable.


Neuro: Awake, alert, oriented. Cranial nerves II through XII unremarkable. 

Cerebellum unremarkable. Motor and sensory unremarkable throughout. Exam 

nonfocal.





Notes:


Dr. Limon verbally involved in patient care.


Patient has been able to tolerate fluids while in the ED without vomiting.


Voices understanding and is agreeable to plan of care. Denies any further 

questions or concerns at this time.





Diagnostics:


CBC, CMP, UA, EKG, chest x-ray, abdominal pelvic CT, right upper quadrant 

ultrasound, stool studies, C. difficile, ova and parasite, hCG





Therapeutics:


Saline, Zofran, Protonix





Prescription:


Cipro, Zofran





Impression: 


Mild colitis


Transaminitis, unspecified





Plan:


1. Take medication as prescribed. You can also use ibuprofen as directed for 

pain and discomfort.


2. Follow-up with general surgery and your primary care provider as discussed. 

Return to the ED as needed and as discussed.


3. Stool collection supplies have been provided to you. Once you are able to 

leave a sample you can return this to our lab.





Definitive disposition and diagnosis as appropriate pending reevaluation and 

review of above.





  ** Abdomen


Pain Score (Numeric/FACES): 6





- Related Data


 Allergies











Allergy/AdvReac Type Severity Reaction Status Date / Time


 


No Known Allergies Allergy   Verified 09/03/19 19:54











Home Meds: 


 Home Meds





Albuterol [Ventolin HFA] 2 puff INH Q4HR PRN #1 inhaler 08/10/19 [Rx]











Past Medical History





- Past Health History


Medical/Surgical History: Denies Medical/Surgical History


HEENT History: Reports: None


Cardiovascular History: Reports: None


Respiratory History: Reports: None


Gastrointestinal History: Reports: None


Genitourinary History: Reports: None


OB/GYN History: Reports: None


Musculoskeletal History: Reports: None


Neurological History: Reports: None


Psychiatric History: Reports: Addiction, Anxiety, Depression


Other Psychiatric History: ETOH addiction.  has been to rehab twice, quit on 

own as well


Endocrine/Metabolic History: Reports: None


Hematologic History: Reports: None


Immunologic History: Reports: None


Oncologic (Cancer) History: Reports: None


Dermatologic History: Reports: None





- Infectious Disease History


Infectious Disease History: Reports: None





- Past Surgical History


Head Surgeries/Procedures: Reports: None


HEENT Surgical History: Reports: None


Cardiovascular Surgical History: Reports: None


Endocrine Surgical History: Reports: None


Neurological Surgical History: Reports: None


Musculoskeletal Surgical History: Reports: None


Dermatological Surgical History: Reports: None





Social & Family History





- Family History


Family Medical History: Noncontributory


HEENT: Reports: None


Cardiac: Reports: None


Endocrine/Metabolic: Reports: None, Diabetes, Type I





- Tobacco Use


Smoking Status *Q: Current Every Day Smoker


Years of Tobacco use: 12


Packs/Tins Daily: 1





- Caffeine Use


Caffeine Use: Reports: None





- Recreational Drug Use


Recreational Drug Use: No





ED ROS GENERAL





- Review of Systems


Review Of Systems: ROS reveals no pertinent complaints other than HPI.





ED EXAM, GENERAL





- Physical Exam


Exam: See Below (see dictation)





Course





- Vital Signs


Last Recorded V/S: 


 Last Vital Signs











Temp  36.1 C   09/03/19 19:56


 


Pulse  108 H  09/03/19 20:40


 


Resp  18   09/03/19 20:40


 


BP  155/119 H  09/03/19 20:40


 


Pulse Ox  96   09/03/19 20:40














- Orders/Labs/Meds


Orders: 


 Active Orders 24 hr











 Category Date Time Status


 


 CDIFF TOX A+B [OP] Stat Lab  09/03/19 20:24 Ordered


 


 CULTURE STOOL + CAMPY+SHIGATOX [RM] Stat Lab  09/03/19 20:24 Ordered


 


 OVA & PARASITES BY IMMUNOASSAY [MREF] Stat Lab  09/03/19 20:24 Ordered


 


 Sodium Chloride 0.9% [Saline Flush] Med  09/03/19 19:44 Active





 10 ml FLUSH ASDIRECTED PRN   


 


 Sodium Chloride 0.9% [Saline Flush] Med  09/03/19 19:44 Active





 2.5 ml FLUSH ASDIRECTED PRN   


 


 Saline Lock Insert [OM.PC] Stat Oth  09/03/19 19:44 Ordered








 Medication Orders





Sodium Chloride (Saline Flush)  10 ml FLUSH ASDIRECTED PRN


   PRN Reason: Keep Vein Open


Sodium Chloride (Saline Flush)  2.5 ml FLUSH ASDIRECTED PRN


   PRN Reason: Keep Vein Open








Labs: 


 Laboratory Tests











  09/03/19 09/03/19 09/03/19 Range/Units





  20:05 20:05 20:05 


 


WBC   13.23 H   (4.0-11.0)  K/uL


 


RBC   5.02   (4.30-5.90)  M/uL


 


Hgb   18.3 H   (12.0-16.0)  g/dL


 


Hct   49.9 H   (36.0-46.0)  %


 


MCV   99.4 H   (80.0-98.0)  fL


 


MCH   36.5 H   (27.0-32.0)  pg


 


MCHC   36.7   (31.0-37.0)  g/dL


 


RDW Std Deviation   45.7   (28.0-62.0)  fl


 


RDW Coeff of Flo   13   (11.0-15.0)  %


 


Plt Count   183   (150-400)  K/uL


 


MPV   10.50   (7.40-12.00)  fL


 


Neut % (Auto)   72.6   (48.0-80.0)  %


 


Lymph % (Auto)   20.9   (16.0-40.0)  %


 


Mono % (Auto)   5.9   (0.0-15.0)  %


 


Eos % (Auto)   0.4   (0.0-7.0)  %


 


Baso % (Auto)   0.2   (0.0-1.5)  %


 


Neut # (Auto)   9.6 H   (1.4-5.7)  K/uL


 


Lymph # (Auto)   2.8 H   (0.6-2.4)  K/uL


 


Mono # (Auto)   0.8   (0.0-0.8)  K/uL


 


Eos # (Auto)   0.1   (0.0-0.7)  K/uL


 


Baso # (Auto)   0.0   (0.0-0.1)  K/uL


 


Nucleated RBC %   0.0   /100WBC


 


Nucleated RBCs #   0   K/uL


 


Sodium    136  (136-145)  mmol/L


 


Potassium    3.8  (3.5-5.1)  mmol/L


 


Chloride    97 L  ()  mmol/L


 


Carbon Dioxide    23.3  (21.0-32.0)  mmol/L


 


BUN    10  (7.0-18.0)  mg/dL


 


Creatinine    0.8  (0.6-1.0)  mg/dL


 


Est Cr Clr Drug Dosing    90.84  mL/min


 


Estimated GFR (MDRD)    > 60.0  ml/min


 


Glucose    123 H  ()  mg/dL


 


Calcium    9.3  (8.5-10.1)  mg/dL


 


Total Bilirubin    2.5 H  (0.2-1.0)  mg/dL


 


AST    203 H  (15-37)  IU/L


 


ALT    93 H  (14-63)  IU/L


 


Alkaline Phosphatase    131 H  ()  U/L


 


Total Protein    7.7  (6.4-8.2)  g/dL


 


Albumin    3.5  (3.4-5.0)  g/dL


 


Globulin    4.2 H  (2.6-4.0)  g/dL


 


Albumin/Globulin Ratio    0.8 L  (0.9-1.6)  


 


Lipase    162  ()  U/L


 


HCG, Qual  NEGATIVE    (NEG)  


 


Urine Color     


 


Urine Appearance     


 


Urine pH     (5.0-8.0)  


 


Ur Specific Gravity     (1.001-1.035)  


 


Urine Protein     (NEGATIVE)  mg/dL


 


Urine Glucose (UA)     (NEGATIVE)  mg/dL


 


Urine Ketones     (NEGATIVE)  mg/dL


 


Urine Occult Blood     (NEGATIVE)  


 


Urine Nitrite     (NEGATIVE)  


 


Urine Bilirubin     (NEGATIVE)  


 


Urine Urobilinogen     (<2.0)  EU/dL


 


Ur Leukocyte Esterase     (NEGATIVE)  


 


Urine RBC     (0-2/HPF)  


 


Urine WBC     (0-5/HPF)  


 


Ur Epithelial Cells     (NONE-FEW)  


 


Urine Bacteria     (NEGATIVE)  














  09/03/19 Range/Units





  21:55 


 


WBC   (4.0-11.0)  K/uL


 


RBC   (4.30-5.90)  M/uL


 


Hgb   (12.0-16.0)  g/dL


 


Hct   (36.0-46.0)  %


 


MCV   (80.0-98.0)  fL


 


MCH   (27.0-32.0)  pg


 


MCHC   (31.0-37.0)  g/dL


 


RDW Std Deviation   (28.0-62.0)  fl


 


RDW Coeff of Flo   (11.0-15.0)  %


 


Plt Count   (150-400)  K/uL


 


MPV   (7.40-12.00)  fL


 


Neut % (Auto)   (48.0-80.0)  %


 


Lymph % (Auto)   (16.0-40.0)  %


 


Mono % (Auto)   (0.0-15.0)  %


 


Eos % (Auto)   (0.0-7.0)  %


 


Baso % (Auto)   (0.0-1.5)  %


 


Neut # (Auto)   (1.4-5.7)  K/uL


 


Lymph # (Auto)   (0.6-2.4)  K/uL


 


Mono # (Auto)   (0.0-0.8)  K/uL


 


Eos # (Auto)   (0.0-0.7)  K/uL


 


Baso # (Auto)   (0.0-0.1)  K/uL


 


Nucleated RBC %   /100WBC


 


Nucleated RBCs #   K/uL


 


Sodium   (136-145)  mmol/L


 


Potassium   (3.5-5.1)  mmol/L


 


Chloride   ()  mmol/L


 


Carbon Dioxide   (21.0-32.0)  mmol/L


 


BUN   (7.0-18.0)  mg/dL


 


Creatinine   (0.6-1.0)  mg/dL


 


Est Cr Clr Drug Dosing   mL/min


 


Estimated GFR (MDRD)   ml/min


 


Glucose   ()  mg/dL


 


Calcium   (8.5-10.1)  mg/dL


 


Total Bilirubin   (0.2-1.0)  mg/dL


 


AST   (15-37)  IU/L


 


ALT   (14-63)  IU/L


 


Alkaline Phosphatase   ()  U/L


 


Total Protein   (6.4-8.2)  g/dL


 


Albumin   (3.4-5.0)  g/dL


 


Globulin   (2.6-4.0)  g/dL


 


Albumin/Globulin Ratio   (0.9-1.6)  


 


Lipase   ()  U/L


 


HCG, Qual   (NEG)  


 


Urine Color  YELLOW  


 


Urine Appearance  CLEAR  


 


Urine pH  6.5  (5.0-8.0)  


 


Ur Specific Gravity  <= 1.005  (1.001-1.035)  


 


Urine Protein  NEGATIVE  (NEGATIVE)  mg/dL


 


Urine Glucose (UA)  NEGATIVE  (NEGATIVE)  mg/dL


 


Urine Ketones  TRACE H  (NEGATIVE)  mg/dL


 


Urine Occult Blood  MODERATE H  (NEGATIVE)  


 


Urine Nitrite  NEGATIVE  (NEGATIVE)  


 


Urine Bilirubin  NEGATIVE  (NEGATIVE)  


 


Urine Urobilinogen  0.2  (<2.0)  EU/dL


 


Ur Leukocyte Esterase  NEGATIVE  (NEGATIVE)  


 


Urine RBC  2-3  (0-2/HPF)  


 


Urine WBC  0-1  (0-5/HPF)  


 


Ur Epithelial Cells  RARE  (NONE-FEW)  


 


Urine Bacteria  RARE  (NEGATIVE)  











Meds: 


Medications











Generic Name Dose Route Start Last Admin





  Trade Name Freq  PRN Reason Stop Dose Admin


 


Sodium Chloride  10 ml  09/03/19 19:44  





  Saline Flush  FLUSH   





  ASDIRECTED PRN   





  Keep Vein Open   





     





     





     


 


Sodium Chloride  2.5 ml  09/03/19 19:44  





  Saline Flush  FLUSH   





  ASDIRECTED PRN   





  Keep Vein Open   





     





     





     














Discontinued Medications














Generic Name Dose Route Start Last Admin





  Trade Name Freq  PRN Reason Stop Dose Admin


 


Sodium Chloride  1,000 mls @ 999 mls/hr  09/03/19 20:24  09/03/19 20:32





  Normal Saline  IV  09/03/19 21:24  999 mls/hr





  STAT ONE   Administration





     





     





     





     


 


Iopamidol  100 ml  09/03/19 21:31  09/03/19 21:32





  Isovue Multipack-370 (76%)  IVPUSH  09/03/19 21:32  100 ml





  ONETIME STA   Administration





     





     





     





     


 


Ondansetron HCl  4 mg  09/03/19 20:24  09/03/19 20:33





  Zofran  IVPUSH  09/03/19 20:25  4 mg





  ONETIME ONE   Administration





     





     





     





     


 


Pantoprazole Sodium  80 mg  09/03/19 20:25  09/03/19 20:32





  Protonix Iv***  IVPUSH  09/03/19 20:26  80 mg





  .BOLUS ONE   Administration





     





     





     





     


 


Sodium Chloride  20 ml  09/03/19 20:25  09/03/19 20:32





  Normal Saline  FLUSH  09/03/19 20:26  20 ml





  NOW STA   Administration





     





     





     





     














Departure





- Departure


Time of Disposition: 23:46


Disposition: Home, Self-Care 01


Clinical Impression: 


 Colitis, Transaminitis








- Discharge Information


Referrals: 


PCP,None [Primary Care Provider] - 


Forms:  ED Department Discharge


Additional Instructions: 


The following information is given to patients seen in the emergency department 

who are being discharged to home. This information is to outline your options 

for follow-up care. We provide all patients seen in our emergency department 

with a follow-up referral.





The need for follow-up, as well as the timing and circumstances, are variable 

depending upon the specifics of your emergency department visit.





If you don't have a primary care physician on staff, we will provide you with a 

referral. We always advise you to contact your personal physician following an 

emergency department visit to inform them of the circumstance of the visit and 

for follow-up with them and/or the need for any referrals to a consulting 

specialist.





The emergency department will also refer you to a specialist when appropriate. 

This referral assures that you have the opportunity for follow-up care with a 

specialist. All of these measure are taken in an effort to provide you with 

optimal care, which includes your follow-up.





Under all circumstances we always encourage you to contact your private 

physician who remains a resource for coordinating your care. When calling for 

follow-up care, please make the office aware that this follow-up is from your 

recent emergency room visit. If for any reason you are refused follow-up, 

please contact the [facility] Emergency 

Department at [phone] and asked to speak to the emergency department 

charge nurse.





[facility]


Primary Care


[address] [place] [address]


Phone: [phone]


Fax: [phone]





[address] [address]


Phone: [phone]


Fax: [phone]





[facility] - General Surgery


[address]


[address] [place] [address], [address] 


[place] [address]


Phone: [phone]


Fax: [phone]





1. Take medication as prescribed. You can also use ibuprofen as directed for 

pain and discomfort.


2. Follow-up with general surgery and your primary care provider as discussed. 

Return to the ED as needed and as discussed.


3. Stool collection supplies have been provided to you. Once you are able to 

leave a sample you can return this to our lab.





 








- My Orders


Last 24 Hours: 


My Active Orders





09/03/19 19:44


Sodium Chloride 0.9% [Saline Flush]   10 ml FLUSH ASDIRECTED PRN 


Sodium Chloride 0.9% [Saline Flush]   2.5 ml FLUSH ASDIRECTED PRN 


Saline Lock Insert [OM.PC] Stat 





09/03/19 20:24


CDIFF TOX A+B [OP] Stat 


CULTURE STOOL + CAMPY+SHIGATOX [RM] Stat 


OVA & PARASITES BY IMMUNOASSAY [MREF] Stat 














- Assessment/Plan


Last 24 Hours: 


My Active Orders





09/03/19 19:44


Sodium Chloride 0.9% [Saline Flush]   10 ml FLUSH ASDIRECTED PRN 


Sodium Chloride 0.9% [Saline Flush]   2.5 ml FLUSH ASDIRECTED PRN 


Saline Lock Insert [OM.PC] Stat 





09/03/19 20:24


CDIFF TOX A+B [OP] Stat 


CULTURE STOOL + CAMPY+SHIGATOX [RM] Stat 


OVA & PARASITES BY IMMUNOASSAY [MREF] Stat

## 2019-09-03 NOTE — US
INDICATION:



Right upper quadrant abdomen pain



TECHNIQUE:



Ultrasound abdomen limited.  Sonographic images of the right upper quadrant 

were obtained using gray-scale and color Doppler images.



COMPARISON:



CT abdomen pelvis September 3, 2019.



FINDINGS:



Liver: Normal in size. Diffusely echogenic. No masses.  No intrahepatic 

biliary dilatation.  



Gallbladder: No stones or sludge.  Normal wall thickness.  No 

pericholecystic fluid.  



Common bile duct: 4 mm.  



Pancreas: Normal.  



Right kidney: Normal in size.  Normal echotexture and cortex.  No 

suspicious masses, stones, or hydronephrosis.  



Vasculature: Proximal abdominal aorta and IVC are normal.  



IMPRESSION:



Hepatic steatosis. Otherwise unremarkable right upper quadrant ultrasound. 

No specific finding to explain pain.



Dictated by Wilbert Shields MD @ Sep  3 2019 11:25PM



Signed by Dr. Wilbert Shields @ Sep  3 2019 11:32PM

## 2019-09-03 NOTE — CT
INDICATION:



Abdominal pain. Cramping 



TECHNIQUE:



CT abdomen and pelvis with intravenous contrast. 100 mL of Isovue 370 

administered. 



COMPARISON:



None available 



FINDINGS:



Lower chest: A partially imaged 1.7 x 0.8 cm soft tissue density along the 

left epicardial fat pad which could be related to a lymph node or regional 

subsegmental atelectasis. 



Liver: Hepatomegaly measuring 20.2 cm craniocaudally. Hepatic steatosis. A 

0.9 x 0.4 cm nodular density adjacent to the gallbladder on image 48, 

nonspecific. 



Spleen: Unremarkable.  



Pancreas: Unremarkable.  



Gallbladder and bile ducts: Unremarkable.  



Adrenal glands: Unremarkable.  



Kidneys: Unremarkable.  No kidney or ureteral stones and no hydronephrosis. 

 



GI tract: No bowel obstruction. A normal appendix. Intramural colonic fat 

attenuation can be seen as sequela of chronic inflammation. Slight colonic 

wall prominence is at least partially related to underdistention, without 

significant pericolonic changes.



Vascular structures: Minimal early atherosclerotic changes. 



Lymph nodes: No abnormally enlarged lymph nodes. 



Miscellaneous:  No significant free fluid or free air. 



Pelvic Organs:  Mild bladder wall thickening, partially related to 

underdistention. No discrete uterine abnormality seen. A tampon in the 

vagina. 



Bones:  A disc protrusion at L4-5, indenting the thecal sac. 



IMPRESSION:



No evidence of appendicitis, diverticulitis or bowel obstruction. Colonic 

intramural fat attenuation can be seen as sequela of chronic inflammation. 

Slight colonic wall prominence is at least partially related to incomplete 

distention, without significant pericolonic changes. Correlate clinically 

to exclude very mild active colonic inflammation. 



Mild bladder wall thickening. Correlate with urinalysis for cystitis. 



Hepatomegaly and hepatic steatosis. A subcentimeter dense hepatic nodule, 

not well evaluated. Recommend follow-up.



Dictated by Odilon Carolina MD @ 9/3/2019 10:05:33 PM



Please note that all CT scans at this facility use dose modulation, 

iterative reconstruction, and/or weight-based dosing when appropriate to 

reduce radiation dose to as low as reasonably achievable.



Dictated by: Odilon Carolina MD @ 09/03/2019 22:05:37



(Electronically Signed)

## 2019-09-04 VITALS — DIASTOLIC BLOOD PRESSURE: 106 MMHG | SYSTOLIC BLOOD PRESSURE: 136 MMHG

## 2019-10-19 ENCOUNTER — HOSPITAL ENCOUNTER (EMERGENCY)
Dept: HOSPITAL 56 - MW.ED | Age: 34
Discharge: HOME | End: 2019-10-19
Payer: SELF-PAY

## 2019-10-19 VITALS — DIASTOLIC BLOOD PRESSURE: 116 MMHG | SYSTOLIC BLOOD PRESSURE: 167 MMHG | HEART RATE: 100 BPM

## 2019-10-19 DIAGNOSIS — H57.89: Primary | ICD-10-CM

## 2019-10-19 NOTE — EDM.PDOC
ED HPI GENERAL MEDICAL PROBLEM





- General


Chief Complaint: ENT Problem


Stated Complaint: LEFT EYE ISSUE;REDNESS


Time Seen by Provider: 10/19/19 11:50


Source of Information: Reports: Patient


History Limitations: Reports: No Limitations





- History of Present Illness


INITIAL COMMENTS - FREE TEXT/NARRATIVE: 





HISTORY AND PHYSICAL:





History of present illness:


Patient is a 34-year-old female presents to the ED with complaint of left eye 

irritation. She states she has been using the same pair of contacts for a few 

months. She has been sleeping in these contacts for weeks without taking them 

out or cleaning them. She states the left eye started bothering her last night 

and she did take her contacts out last night. this morning her left eye was red

, painful, and sensitive to light. She states it feels like there is something 

in her eye. She denies headache, fevers, chills, nausea, vomiting. 





Review of systems: 


As per history of present illness and below otherwise all systems reviewed and 

negative.





Past medical history: 


As per history of present illness and as reviewed below otherwise 

noncontributory.





Surgical history: 


As per history of present illness and as reviewed below otherwise 

noncontributory.





Social history: 


No reported history of drug or alcohol abuse.





Family history: 


As per history of present illness and as reviewed below otherwise 

noncontributory.





Physical exam:


General: Patient sitting comfortably in no acute distress and nontoxic appearing


HEENT: Left eye is injected with watery drainage. There is increased uptake at 

the 5 o'clock position over the iris. Atraumatic, normocephalic, pupils reactive

, negative for conjunctival pallor or scleral icterus, mucous membranes moist, 

throat clear, neck supple, nontender, trachea midline. No meningeal signs. 


Lungs: Clear to auscultation, breath sounds equal bilaterally, chest nontender.


Heart: S1S2, regular, negative for clicks, rubs, or overt murmur.


Abdomen: Soft, nondistended, nontender. Negative for masses or 

hepatosplenomegaly. Negative for costovertebral tenderness. No rigidity, rebound

, guarding.


Pelvis: Stable nontender.


Genitourinary: Deferred.


Rectal: Deferred.


Extremities: Atraumatic, negative for cords or calf pain. Neurovascular 

unremarkable.


Neuro: Awake, alert, oriented. Cranial nerves II through XII unremarkable. 

Cerebellum unremarkable. Motor and sensory unremarkable throughout. Exam 

nonfocal.





Notes: Discussed with Dr. Araiza, he will follow up with patient tomorrow at 

his clinic. 





Diagnostics:








Therapeutics:


[]





Prescriptions:








Impression: 


conjunctivitis vs bacterial keratitis 





Plan:


Use drops as instructed. Do not wear contacts until follow up.


Follow up with Dr. Araiza tomorrow at [facility] at 10:00am


Return to ED as needed as discussed 





Definitive disposition and diagnosis as appropriate pending reevaluation and 

review of above.





  ** Left Eye


Pain Score (Numeric/FACES): 8





- Related Data


 Allergies











Allergy/AdvReac Type Severity Reaction Status Date / Time


 


No Known Allergies Allergy   Verified 10/19/19 11:51











Home Meds: 


 Home Meds





Albuterol [Ventolin HFA] 2 puff INH Q4HR PRN #1 inhaler 08/10/19 [Rx]


Moxifloxacin [Vigamox 0.5% Ophth Soln] 1 drop EYEBOTH Q2H #1 bottle 10/19/19 [Rx

]











Past Medical History





- Past Health History


Medical/Surgical History: Denies Medical/Surgical History


HEENT History: Reports: None


Cardiovascular History: Reports: None


Respiratory History: Reports: None


Gastrointestinal History: Reports: None


Genitourinary History: Reports: None


OB/GYN History: Reports: None


Musculoskeletal History: Reports: None


Neurological History: Reports: None


Psychiatric History: Reports: Addiction, Anxiety, Depression


Other Psychiatric History: ETOH addiction.  has been to rehab twice, quit on 

own as well


Endocrine/Metabolic History: Reports: None


Insulin Pump Model and : N/A


Hematologic History: Reports: None


Immunologic History: Reports: None


Oncologic (Cancer) History: Reports: None


Dermatologic History: Reports: None





- Infectious Disease History


Infectious Disease History: Reports: None





- Past Surgical History


Head Surgeries/Procedures: Reports: None


HEENT Surgical History: Reports: None


Cardiovascular Surgical History: Reports: None


Endocrine Surgical History: Reports: None


Neurological Surgical History: Reports: None


Musculoskeletal Surgical History: Reports: None


Dermatological Surgical History: Reports: None





Social & Family History





- Family History


Family Medical History: Noncontributory


HEENT: Reports: None


Cardiac: Reports: None


Endocrine/Metabolic: Reports: None, Diabetes, Type I





- Caffeine Use


Caffeine Use: Reports: None





ED ROS ENT





- Review of Systems


Review Of Systems: ROS reveals no pertinent complaints other than HPI.





ED EXAM, ENT





- Physical Exam


Exam: See Below (see dictation)





Course





- Vital Signs


Last Recorded V/S: 


 Last Vital Signs











Temp  97.1 F   10/19/19 11:51


 


Pulse  114 H  10/19/19 11:51


 


Resp  18   10/19/19 11:51


 


BP  172/115 H  10/19/19 11:51


 


Pulse Ox  95   10/19/19 11:51














- Orders/Labs/Meds


Meds: 


Medications














Discontinued Medications














Generic Name Dose Route Start Last Admin





  Trade Name Moni  PRN Reason Stop Dose Admin


 


Proparacaine HCl  1 ml  10/19/19 12:00  10/19/19 12:21





  Proparacaine 0.5% Ophth Soln  EYEBOTH  10/19/19 12:01  Not Given





  ONETIME ONE   





     





     





     





     


 


Tetracaine HCl  1 ml  10/19/19 12:05  10/19/19 12:21





  Tetracaine 0.5% Steri-Unit Sol  EYEBOTH  10/19/19 12:06  1 bot





  ASDIRECTED ONE   Administration





     





     





     





     














Departure





- Departure


Time of Disposition: 12:32


Disposition: Home, Self-Care 01


Condition: Good


Clinical Impression: 


 Irritation of left eye








- Discharge Information


Referrals: 


PCP,None [Primary Care Provider] - 


Forms:  ED Department Discharge


Additional Instructions: 


The following information is given to patients seen in the emergency department 

who are being discharged to home. This information is to outline your options 

for follow-up care. We provide all patients seen in our emergency department 

with a follow-up referral.





The need for follow-up, as well as the timing and circumstances, are variable 

depending upon the specifics of your emergency department visit.





If you don't have a primary care physician on staff, we will provide you with a 

referral. We always advise you to contact your personal physician following an 

emergency department visit to inform them of the circumstance of the visit and 

for follow-up with them and/or the need for any referrals to a consulting 

specialist.





The emergency department will also refer you to a specialist when appropriate. 

This referral assures that you have the opportunity for follow-up care with a 

specialist. All of these measure are taken in an effort to provide you with 

optimal care, which includes your follow-up.





Under all circumstances we always encourage you to contact your private 

physician who remains a resource for coordinating your care. When calling for 

follow-up care, please make the office aware that this follow-up is from your 

recent emergency room visit. If for any reason you are refused follow-up, 

please contact the [facility] Emergency 

Department at [phone] and asked to speak to the emergency department 

charge nurse.





[address] [address]


Phone: [phone]


Fax: [phone]








Use drops as instructed. Do not wear contacts until follow up.


Follow up with Dr. Araiza tomorrow at [facility] at 10:00am


Return to ED as needed as discussed

## 2019-11-26 ENCOUNTER — HOSPITAL ENCOUNTER (EMERGENCY)
Dept: HOSPITAL 56 - MW.ED | Age: 34
Discharge: HOME | End: 2019-11-26
Payer: SELF-PAY

## 2019-11-26 VITALS — SYSTOLIC BLOOD PRESSURE: 143 MMHG | DIASTOLIC BLOOD PRESSURE: 72 MMHG

## 2019-11-26 VITALS — HEART RATE: 74 BPM

## 2019-11-26 DIAGNOSIS — R42: Primary | ICD-10-CM

## 2019-11-26 DIAGNOSIS — Z79.899: ICD-10-CM

## 2019-11-26 DIAGNOSIS — F41.9: ICD-10-CM

## 2019-11-26 DIAGNOSIS — F32.9: ICD-10-CM

## 2019-11-26 LAB
BUN SERPL-MCNC: 7 MG/DL (ref 7–18)
CHLORIDE SERPL-SCNC: 98 MMOL/L (ref 98–107)
CO2 SERPL-SCNC: 27.1 MMOL/L (ref 21–32)
GLUCOSE SERPL-MCNC: 83 MG/DL (ref 74–106)
POTASSIUM SERPL-SCNC: 3.8 MMOL/L (ref 3.5–5.1)
SODIUM SERPL-SCNC: 134 MMOL/L (ref 136–145)

## 2019-11-26 PROCEDURE — 81003 URINALYSIS AUTO W/O SCOPE: CPT

## 2019-11-26 PROCEDURE — 36415 COLL VENOUS BLD VENIPUNCTURE: CPT

## 2019-11-26 PROCEDURE — 96361 HYDRATE IV INFUSION ADD-ON: CPT

## 2019-11-26 PROCEDURE — 70450 CT HEAD/BRAIN W/O DYE: CPT

## 2019-11-26 PROCEDURE — 85025 COMPLETE CBC W/AUTO DIFF WBC: CPT

## 2019-11-26 PROCEDURE — 81025 URINE PREGNANCY TEST: CPT

## 2019-11-26 PROCEDURE — 84484 ASSAY OF TROPONIN QUANT: CPT

## 2019-11-26 PROCEDURE — 99284 EMERGENCY DEPT VISIT MOD MDM: CPT

## 2019-11-26 PROCEDURE — 71045 X-RAY EXAM CHEST 1 VIEW: CPT

## 2019-11-26 PROCEDURE — 80053 COMPREHEN METABOLIC PANEL: CPT

## 2019-11-26 PROCEDURE — 96374 THER/PROPH/DIAG INJ IV PUSH: CPT

## 2019-11-26 NOTE — EDM.PDOC
ED HPI GENERAL MEDICAL PROBLEM





- General


Chief Complaint: Cardiovascular Problem


Stated Complaint: DIZZINESS


Time Seen by Provider: 11/26/19 11:57


Source of Information: Reports: Patient





- History of Present Illness


INITIAL COMMENTS - FREE TEXT/NARRATIVE: 





HISTORY AND PHYSICAL:


History of present illness:


[Patient presents with dizziness





Patient presents as such no fever nausea vomiting chills sweats no chest pain 

shortness breath headache or palpitation no bowel or urine symptoms








As of again 30 minutes prior to arrival she attributes to new medications she 

is on blood pressure medication as well as Zoloft all started today, he equates 

her dizziness to the start of new medication





Although there is some head position component with looking down dizziness is 

worse


]


Review of systems: 


As per history of present illness and below otherwise all systems reviewed and 

negative.


Past medical history: 


As per history of present illness and as reviewed below otherwise 

noncontributory.


Surgical history: 


As per history of present illness and as reviewed below otherwise 

noncontributory.


Social history: 


No reported history of drug or alcohol abuse.


Family history: 


As per history of present illness and as reviewed below otherwise 

noncontributory.





Physical exam:


HEENT: Atraumatic, normocephalic, pupils reactive, negative for conjunctival 

pallor or scleral icterus, mucous membranes moist, throat clear, neck supple, 

nontender, trachea midline.


Lungs: Clear to auscultation, breath sounds equal bilaterally, chest nontender.


Heart: S1S2, regular, negative for clicks, rubs, or JVD.


Abdomen: Soft, nondistended, nontender. Negative for masses or 

hepatosplenomegaly. Negative for costovertebral tenderness.


Pelvis: Stable nontender.


Genitourinary: Deferred.


Rectal: Deferred.


Extremities: Atraumatic, negative for cords or calf pain. Neurovascular 

unremarkable.


Neuro: Awake, alert, oriented. Cranial nerves II through XII unremarkable. 

Cerebellum unremarkable. Motor and sensory unremarkable throughout. Exam 

nonfocal.





Diagnostics:


[CBC  cmp troponin UA hCG ]


ekg


chest


head ct





Therapeutics:


 saline


Labetalol


 scop transderm patch


Hold Zoloft continue blood pressure medication


Follow-up with primary care tomorrow ]





Impression: 


[ 9 positional vertigo versus medication side effect


Medical screening exam ]


Definitive disposition and diagnosis as appropriate pending reevaluation and 

review of above.


  ** Middle Chest


Pain Score (Numeric/FACES): 6





- Related Data


 Allergies











Allergy/AdvReac Type Severity Reaction Status Date / Time


 


No Known Allergies Allergy   Verified 11/26/19 11:56











Home Meds: 


 Home Meds





Albuterol [Ventolin HFA] 2 puff INH Q4HR PRN #1 inhaler 08/10/19 [Rx]


Sertraline [Zoloft] 50 mg PO DAILY 11/26/19 [History]


Sulfamethoxazole/Trimethoprim [Bactrim Ds Tablet] 1 each PO ASDIRECTED 11/26/19 

[History]


amLODIPine [Norvasc] 5 mg PO DAILY 11/26/19 [History]











Past Medical History





- Past Health History


Medical/Surgical History: Denies Medical/Surgical History


HEENT History: Reports: None


Cardiovascular History: Reports: None


Respiratory History: Reports: None


Gastrointestinal History: Reports: None


Genitourinary History: Reports: None


OB/GYN History: Reports: None


Musculoskeletal History: Reports: None


Neurological History: Reports: None


Psychiatric History: Reports: Addiction, Anxiety, Depression


Other Psychiatric History: ETOH addiction.  has been to rehab twice, quit on 

own as well


Endocrine/Metabolic History: Reports: None


Insulin Pump Model and : N/A


Hematologic History: Reports: None


Immunologic History: Reports: None


Oncologic (Cancer) History: Reports: None


Dermatologic History: Reports: None





- Infectious Disease History


Infectious Disease History: Reports: None





- Past Surgical History


Head Surgeries/Procedures: Reports: None


HEENT Surgical History: Reports: None


Cardiovascular Surgical History: Reports: None


Endocrine Surgical History: Reports: None


Neurological Surgical History: Reports: None


Musculoskeletal Surgical History: Reports: None


Dermatological Surgical History: Reports: None





Social & Family History





- Family History


Family Medical History: Noncontributory


HEENT: Reports: None


Cardiac: Reports: None


Endocrine/Metabolic: Reports: None, Diabetes, Type I





- Caffeine Use


Caffeine Use: Reports: None





ED ROS GENERAL





- Review of Systems


Review Of Systems: See Below





ED EXAM, GENERAL





- Physical Exam


Exam: See Below





Course





- Vital Signs


Last Recorded V/S: 


 Last Vital Signs











Temp  97.8 F   11/26/19 11:54


 


Pulse  74   11/26/19 13:31


 


Resp  18   11/26/19 13:31


 


BP  144/90 H  11/26/19 13:31


 


Pulse Ox  98   11/26/19 13:31








 





Orthostatic Blood Pressure [     146/92


Standing]                        


Orthostatic Blood Pressure [     152/102


Sitting]                         


Orthostatic Blood Pressure [     156/99


Supine]                          











- Orders/Labs/Meds


Orders: 


 Active Orders 24 hr











 Category Date Time Status


 


 Orthostatic Vital Signs [RC] ASDIRECTED Care  11/26/19 11:56 Active











Labs: 


 Laboratory Tests











  11/26/19 11/26/19 11/26/19 Range/Units





  12:15 12:15 12:20 


 


WBC    9.44  (4.0-11.0)  K/uL


 


RBC    4.67  (4.30-5.90)  M/uL


 


Hgb    17.2 H  (12.0-16.0)  g/dL


 


Hct    48.8 H  (36.0-46.0)  %


 


MCV    104.5 H  (80.0-98.0)  fL


 


MCH    36.8 H  (27.0-32.0)  pg


 


MCHC    35.2  (31.0-37.0)  g/dL


 


RDW Std Deviation    48.9  (28.0-62.0)  fl


 


RDW Coeff of Flo    13  (11.0-15.0)  %


 


Plt Count    207  (150-400)  K/uL


 


MPV    9.80  (7.40-12.00)  fL


 


Neut % (Auto)    69.0  (48.0-80.0)  %


 


Lymph % (Auto)    22.7  (16.0-40.0)  %


 


Mono % (Auto)    7.1  (0.0-15.0)  %


 


Eos % (Auto)    0.8  (0.0-7.0)  %


 


Baso % (Auto)    0.4  (0.0-1.5)  %


 


Neut # (Auto)    6.5 H  (1.4-5.7)  K/uL


 


Lymph # (Auto)    2.1  (0.6-2.4)  K/uL


 


Mono # (Auto)    0.7  (0.0-0.8)  K/uL


 


Eos # (Auto)    0.1  (0.0-0.7)  K/uL


 


Baso # (Auto)    0.0  (0.0-0.1)  K/uL


 


Nucleated RBC %    0.0  /100WBC


 


Nucleated RBCs #    0  K/uL


 


Sodium     (136-145)  mmol/L


 


Potassium     (3.5-5.1)  mmol/L


 


Chloride     ()  mmol/L


 


Carbon Dioxide     (21.0-32.0)  mmol/L


 


BUN     (7.0-18.0)  mg/dL


 


Creatinine     (0.6-1.0)  mg/dL


 


Est Cr Clr Drug Dosing     mL/min


 


Estimated GFR (MDRD)     ml/min


 


Glucose     ()  mg/dL


 


Calcium     (8.5-10.1)  mg/dL


 


Total Bilirubin     (0.2-1.0)  mg/dL


 


AST     (15-37)  IU/L


 


ALT     (14-63)  IU/L


 


Alkaline Phosphatase     ()  U/L


 


Troponin I     (0.000-0.056)  ng/mL


 


Total Protein     (6.4-8.2)  g/dL


 


Albumin     (3.4-5.0)  g/dL


 


Globulin     (2.6-4.0)  g/dL


 


Albumin/Globulin Ratio     (0.9-1.6)  


 


Urine Color  YELLOW    


 


Urine Appearance  CLEAR    


 


Urine pH  7.0    (5.0-8.0)  


 


Ur Specific Gravity  <= 1.005    (1.001-1.035)  


 


Urine Protein  NEGATIVE    (NEGATIVE)  mg/dL


 


Urine Glucose (UA)  NEGATIVE    (NEGATIVE)  mg/dL


 


Urine Ketones  NEGATIVE    (NEGATIVE)  mg/dL


 


Urine Occult Blood  NEGATIVE    (NEGATIVE)  


 


Urine Nitrite  NEGATIVE    (NEGATIVE)  


 


Urine Bilirubin  NEGATIVE    (NEGATIVE)  


 


Urine Urobilinogen  0.2    (<2.0)  EU/dL


 


Ur Leukocyte Esterase  NEGATIVE    (NEGATIVE)  


 


Urine HCG, Qual   NEGATIVE   (NEGATIVE)  














  11/26/19 Range/Units





  12:20 


 


WBC   (4.0-11.0)  K/uL


 


RBC   (4.30-5.90)  M/uL


 


Hgb   (12.0-16.0)  g/dL


 


Hct   (36.0-46.0)  %


 


MCV   (80.0-98.0)  fL


 


MCH   (27.0-32.0)  pg


 


MCHC   (31.0-37.0)  g/dL


 


RDW Std Deviation   (28.0-62.0)  fl


 


RDW Coeff of Flo   (11.0-15.0)  %


 


Plt Count   (150-400)  K/uL


 


MPV   (7.40-12.00)  fL


 


Neut % (Auto)   (48.0-80.0)  %


 


Lymph % (Auto)   (16.0-40.0)  %


 


Mono % (Auto)   (0.0-15.0)  %


 


Eos % (Auto)   (0.0-7.0)  %


 


Baso % (Auto)   (0.0-1.5)  %


 


Neut # (Auto)   (1.4-5.7)  K/uL


 


Lymph # (Auto)   (0.6-2.4)  K/uL


 


Mono # (Auto)   (0.0-0.8)  K/uL


 


Eos # (Auto)   (0.0-0.7)  K/uL


 


Baso # (Auto)   (0.0-0.1)  K/uL


 


Nucleated RBC %   /100WBC


 


Nucleated RBCs #   K/uL


 


Sodium  134 L  (136-145)  mmol/L


 


Potassium  3.8  (3.5-5.1)  mmol/L


 


Chloride  98  ()  mmol/L


 


Carbon Dioxide  27.1  (21.0-32.0)  mmol/L


 


BUN  7  (7.0-18.0)  mg/dL


 


Creatinine  0.8  (0.6-1.0)  mg/dL


 


Est Cr Clr Drug Dosing  87.18  mL/min


 


Estimated GFR (MDRD)  > 60.0  ml/min


 


Glucose  83  ()  mg/dL


 


Calcium  9.3  (8.5-10.1)  mg/dL


 


Total Bilirubin  0.6  (0.2-1.0)  mg/dL


 


AST  50 H  (15-37)  IU/L


 


ALT  52  (14-63)  IU/L


 


Alkaline Phosphatase  93  ()  U/L


 


Troponin I  < 0.050  (0.000-0.056)  ng/mL


 


Total Protein  9.2 H  (6.4-8.2)  g/dL


 


Albumin  4.2  (3.4-5.0)  g/dL


 


Globulin  5.0 H  (2.6-4.0)  g/dL


 


Albumin/Globulin Ratio  0.8 L  (0.9-1.6)  


 


Urine Color   


 


Urine Appearance   


 


Urine pH   (5.0-8.0)  


 


Ur Specific Gravity   (1.001-1.035)  


 


Urine Protein   (NEGATIVE)  mg/dL


 


Urine Glucose (UA)   (NEGATIVE)  mg/dL


 


Urine Ketones   (NEGATIVE)  mg/dL


 


Urine Occult Blood   (NEGATIVE)  


 


Urine Nitrite   (NEGATIVE)  


 


Urine Bilirubin   (NEGATIVE)  


 


Urine Urobilinogen   (<2.0)  EU/dL


 


Ur Leukocyte Esterase   (NEGATIVE)  


 


Urine HCG, Qual   (NEGATIVE)  











Meds: 


Medications














Discontinued Medications














Generic Name Dose Route Start Last Admin





  Trade Name Jakubq  PRN Reason Stop Dose Admin


 


Sodium Chloride  1,000 mls @ 999 mls/hr  11/26/19 11:56  11/26/19 12:23





  Normal Saline  IV  11/26/19 12:56  999 mls/hr





  STAT ONE   Administration





     





     





     





     


 


Labetalol HCl  10 mg  11/26/19 12:30  11/26/19 12:40





  Normodyne  IVPUSH  11/26/19 12:31  10 mg





  ONETIME ONE   Administration





     





     





  Protocol   





     














Departure





- Departure


Time of Disposition: 14:05


Disposition: Home, Self-Care 01


Condition: Good


Clinical Impression: 


 Vertigo








- Discharge Information


Referrals: 


PCP,Unknown [Primary Care Provider] - 


Forms:  ED Department Discharge


Additional Instructions: 


Zoloft


Continue blood pressure medication


Follow-up with primary care tomorrow as scheduled





The following information is given to patients seen in the emergency department 

who are being discharged to home. This information is to outline your options 

for follow-up care. We provide all patients seen in our emergency department 

with a follow-up referral.





The need for follow-up, as well as the timing and circumstances, are variable 

depending upon the specifics of your emergency department visit.





If you don't have a primary care physician on staff, we will provide you with a 

referral. We always advise you to contact your personal physician following an 

emergency department visit to inform them of the circumstance of the visit and 

for follow-up with them and/or the need for any referrals to a consulting 

specialist.





The emergency department will also refer you to a specialist when appropriate. 

This referral assures that you have the opportunity for follow-up care with a 

specialist. All of these measure are taken in an effort to provide you with 

optimal care, which includes your follow-up.





Under all circumstances we always encourage you to contact your private 

physician who remains a resource for coordinating your care. When calling for 

follow-up care, please make the office aware that this follow-up is from your 

recent emergency room visit. If for any reason you are refused follow-up, 

please contact the [facility] emergency department at [phone] 

and asked to speak to the emergency department charge nurse.








- My Orders


Last 24 Hours: 


My Active Orders





11/26/19 11:56


Orthostatic Vital Signs [RC] ASDIRECTED 














- Assessment/Plan


Last 24 Hours: 


My Active Orders





11/26/19 11:56


Orthostatic Vital Signs [RC] ASDIRECTED

## 2019-11-26 NOTE — CT
EXAM DATE: 11/26/19



PATIENT'S AGE: 34



Head CT



Technique: Multiple axial sections through the brain were obtained.  
Intravenous contrast was not utilized.



Comparison: Previous head CT study of 12/02/17.



Findings: Moderate mucosal thickening is noted within the maxillary sinuses 
which appears to be chronic.



Ventricles along with basal cisterns and sulci over the convexities appear 
within normal limits for the patient's age.  No abnormal parenchymal densities 
are seen.  No evidence of intracranial hemorrhage.  No midline shift or mass 
effect is seen.



Bone window settings were reviewed which shows no acute calvarial abnormality.



Impression:

1.  Moderately severe chronic appearing sinusitis within both maxillary sinuses.

2.  No acute intracranial abnormality is appreciated.



Diagnostic code #2



This report was dictated in [place] Standard Time



Report Signed by Proxy.



[facility]

## 2019-11-26 NOTE — CR
EXAM DATE: 11/26/19



PATIENT'S AGE: 34



Chest: PA view of the chest was obtained.



Comparison: Prior chest x-ray of 09/03/19.



Heart size appears at the upper limits of normal.  Lungs are clear.  Bony 
structures are grossly intact.



Impression:

1.  Heart size at the upper limits of normal.

2.  Nothing acute is seen.



Diagnostic code #2



This report was dictated in [place] Standard Time



Report Signed by Proxy.



[facility]

## 2020-06-15 ENCOUNTER — HOSPITAL ENCOUNTER (EMERGENCY)
Dept: HOSPITAL 56 - MW.ED | Age: 35
Discharge: HOME | End: 2020-06-15
Payer: SELF-PAY

## 2020-06-15 VITALS — HEART RATE: 102 BPM | DIASTOLIC BLOOD PRESSURE: 69 MMHG | SYSTOLIC BLOOD PRESSURE: 138 MMHG

## 2020-06-15 DIAGNOSIS — Z79.899: ICD-10-CM

## 2020-06-15 DIAGNOSIS — K04.7: Primary | ICD-10-CM

## 2020-06-15 DIAGNOSIS — K02.9: ICD-10-CM

## 2020-06-15 PROCEDURE — 99282 EMERGENCY DEPT VISIT SF MDM: CPT

## 2020-06-15 NOTE — EDM.PDOC
ED HPI GENERAL MEDICAL PROBLEM





- General


Chief Complaint: General


Stated Complaint: TOOTH ACHE


Time Seen by Provider: 06/15/20 17:25


Source of Information: Reports: Patient


History Limitations: Reports: No Limitations





- History of Present Illness


INITIAL COMMENTS - FREE TEXT/NARRATIVE: 


HISTORY AND PHYSICAL:





History of present illness:


Patient is a 35-year-old female who presents to the emergency room with 

complaints of her right wisdom tooth causing pain.  She did see the dentist 

last Friday and was started on antibiotics.  She initially was given a 

prescription for tramadol which she states did not work.  She then received a 

prescription for Tylenol with codeine which she states did help alleviate the 

pain some but still describing it as a 10/10.  Her appointment is next week to 

have the tooth removed.  Patient denies any fever, chills, headache, change in 

vision, syncope or near syncope. Denies any chest pain, back pain, shortness of 

breath or cough. Denies any abdominal pain, nausea, vomiting, diarrhea, 

constipation or dysuria. Has not noted any blood in urine or stool. Patient has 

been eating and drinking appropriately.





Review of systems: 


As per history of present illness and below otherwise all systems reviewed and 

negative.





Past medical history: 


As per history of present illness and as reviewed below otherwise 

noncontributory.





Surgical history: 


As per history of present illness and as reviewed below otherwise 

noncontributory.





Social history: 


See social history for further information





Family history: 


As per history of present illness and as reviewed below otherwise 

noncontributory.





Physical exam:


General: Well-developed and well-nourished 35-year-old female.  Alert and 

oriented.  Nontoxic-appearing and in no acute distress.


HEENT: Atraumatic, normocephalic, pupils equal and reactive bilaterally, 

negative for conjunctival pallor or scleral icterus, mucous membranes moist, 

multiple dental caries, severe decay of the left upper posterior molar with 

mild erythema and irritation along the gumline.  TMs normal bilaterally, throat 

clear, neck supple, nontender, trachea midline. No drooling or trismus noted. 

No meningeal signs. No hot potato voice noted. 


Lungs: Clear to auscultation, breath sounds equal bilaterally, chest nontender.


Heart: S1S2, regular rate and rhythm without overt murmur


Abdomen: Soft, nondistended, nontender. 


Skin: Intact, warm, dry. No lesions or rashes noted.


Extremities: Atraumatic, moves all extremities per self without difficulty or 

deficits, negative for cords or calf pain. Neurovascular unremarkable.


Neuro: Awake, alert, oriented. Cranial nerves II through XII unremarkable. 

Cerebellum unremarkable. Motor and sensory unremarkable throughout. Exam 

nonfocal.





Notes:


The need for appropriate follow-up, medication and supportive care measures 

were reviewed and discussed. Voices understanding and is agreeable to plan of 

care. Denies any further questions or concerns at this time.





Diagnostics:


None





Therapeutics:


Dental balls





Prescription:


Percocet No. 15





Impression: 


Dental abscess





Plan:


1. Please take the antibiotic as prescribed.


2. Tylenol and/or ibuprofen as needed for pain management. "Tooth Balls" have 

been given to you; apply along the gumline every 2-3 hours as needed. Do not 

swallow these; external use only. 


3. Follow-up with a dentist for definitive care. Return to the ED as needed and 

as discussed.





Definitive disposition and diagnosis as appropriate pending reevaluation and 

review of above.





  ** L Mouth


Pain Score (Numeric/FACES): 10





- Related Data


 Allergies











Allergy/AdvReac Type Severity Reaction Status Date / Time


 


No Known Allergies Allergy   Verified 11/26/19 11:56











Home Meds: 


 Home Meds





amLODIPine [Norvasc] 10 mg PO DAILY 11/26/19 [History]


hydroCHLOROthiazide [Hydrochlorothiazide] 25 mg PO DAILY 06/15/20 [History]


oxyCODONE HCl/Acetaminophen [Percocet 7.5-325 mg Tablet] 1 each PO Q6HR PRN #15 

tablet 06/15/20 [Rx]











Past Medical History





- Past Health History


Medical/Surgical History: Denies Medical/Surgical History


HEENT History: Reports: None


Cardiovascular History: Reports: None


Respiratory History: Reports: None


Gastrointestinal History: Reports: None


Genitourinary History: Reports: None


OB/GYN History: Reports: None


Musculoskeletal History: Reports: None


Neurological History: Reports: None


Psychiatric History: Reports: Addiction, Anxiety, Depression


Other Psychiatric History: ETOH addiction.  has been to rehab twice, quit on 

own as well


Endocrine/Metabolic History: Reports: None


Insulin Pump Model and : N/A


Hematologic History: Reports: None


Immunologic History: Reports: None


Oncologic (Cancer) History: Reports: None


Dermatologic History: Reports: None





- Infectious Disease History


Infectious Disease History: Reports: None





- Past Surgical History


Head Surgeries/Procedures: Reports: None


HEENT Surgical History: Reports: None


Cardiovascular Surgical History: Reports: None


Endocrine Surgical History: Reports: None


Neurological Surgical History: Reports: None


Musculoskeletal Surgical History: Reports: None


Dermatological Surgical History: Reports: None





Social & Family History





- Family History


Family Medical History: Noncontributory


HEENT: Reports: None


Cardiac: Reports: None


Endocrine/Metabolic: Reports: None, Diabetes, Type I





- Caffeine Use


Caffeine Use: Reports: None





ED ROS GENERAL





- Review of Systems


Review Of Systems: Comprehensive ROS is negative, except as noted in HPI.





ED EXAM, GENERAL





- Physical Exam


Exam: See Below (See dictation)





Course





- Vital Signs


Last Recorded V/S: 


 Last Vital Signs











Temp  97.8 F   06/15/20 17:33


 


Pulse  106 H  06/15/20 17:33


 


Resp  20   06/15/20 17:33


 


BP  154/92 H  06/15/20 17:33


 


Pulse Ox  91 L  06/15/20 17:33














- Orders/Labs/Meds


Meds: 


Medications














Discontinued Medications














Generic Name Dose Route Start Last Admin





  Trade Name Freq  PRN Reason Stop Dose Admin


 


Benzocaine  2 each  06/15/20 17:25  





  Hurricaine One 20%  MUCMEM  06/15/20 17:26  





  ONETIME ONE   





     





     





     





     


 


Lidocaine HCl  15 ml  06/15/20 17:25  





  Xylocaine 2% Viscous  PO  06/15/20 17:26  





  ONETIME ONE   





     





     





     





     














Departure





- Departure


Time of Disposition: 18:08


Disposition: Home, Self-Care 01


Clinical Impression: 


 Dental abscess








- Discharge Information


Prescriptions: 


oxyCODONE HCl/Acetaminophen [Percocet 7.5-325 mg Tablet] 1 each PO Q6HR PRN #15 

tablet


 PRN Reason: Pain


Instructions:  Dental Abscess, Easy-to-Read


Referrals: 


PCP,None [Primary Care Provider] - 


Forms:  ED Department Discharge


Additional Instructions: 


The following information is given to patients seen in the emergency department 

who are being discharged to home. This information is to outline your options 

for follow-up care. We provide all patients seen in our emergency department 

with a follow-up referral.





The need for follow-up, as well as the timing and circumstances, are variable 

depending upon the specifics of your emergency department visit.





If you don't have a primary care physician on staff, we will provide you with a 

referral. We always advise you to contact your personal physician following an 

emergency department visit to inform them of the circumstance of the visit and 

for follow-up with them and/or the need for any referrals to a consulting 

specialist.





The emergency department will also refer you to a specialist when appropriate. 

This referral assures that you have the opportunity for follow-up care with a 

specialist. All of these measure are taken in an effort to provide you with 

optimal care, which includes your follow-up.





Under all circumstances we always encourage you to contact your private 

physician who remains a resource for coordinating your care. When calling for 

follow-up care, please make the office aware that this follow-up is from your 

recent emergency room visit. If for any reason you are refused follow-up, 

please contact the [facility] Emergency 

Department at [phone] and asked to speak to the emergency department 

charge nurse.





[facility]


Primary Care


[address] [place] [address]


Phone: [phone]


Fax: [phone]





[facility]


[address] [place] [address]


Phone: [phone]


Fax: [phone]





1. Please take the antibiotic as prescribed.


2. Tylenol and/or ibuprofen as needed for pain management. "Tooth Balls" have 

been given to you; apply along the gumline every 2-3 hours as needed. Do not 

swallow these; external use only. 


3. Follow-up with a dentist for definitive care. Return to the ED as needed and 

as discussed.





Sepsis Event Note (ED)





- Focused Exam


Vital Signs: 


 Vital Signs











  Temp Pulse Resp BP Pulse Ox


 


 06/15/20 17:33  97.8 F  106 H  20  154/92 H  91 L

## 2020-06-19 ENCOUNTER — HOSPITAL ENCOUNTER (EMERGENCY)
Dept: HOSPITAL 56 - MW.ED | Age: 35
Discharge: HOME | End: 2020-06-19
Payer: SELF-PAY

## 2020-06-19 VITALS — DIASTOLIC BLOOD PRESSURE: 97 MMHG | SYSTOLIC BLOOD PRESSURE: 165 MMHG | HEART RATE: 118 BPM

## 2020-06-19 DIAGNOSIS — Z79.899: ICD-10-CM

## 2020-06-19 DIAGNOSIS — F41.0: Primary | ICD-10-CM

## 2020-06-19 PROCEDURE — 82962 GLUCOSE BLOOD TEST: CPT

## 2020-06-19 PROCEDURE — 99283 EMERGENCY DEPT VISIT LOW MDM: CPT

## 2020-06-19 NOTE — EDM.PDOCBH
ED HPI GENERAL MEDICAL PROBLEM





- General


Chief Complaint: Behavioral/Psych


Stated Complaint: BREATHING PROBLEM


Time Seen by Provider: 06/19/20 17:40


Source of Information: Reports: Patient


History Limitations: Reports: No Limitations





- History of Present Illness


INITIAL COMMENTS - FREE TEXT/NARRATIVE: 


HISTORY AND PHYSICAL:





History of present illness:


Patient is a 35-year-old female who presents to the emergency room today with 

complaints of a panic attack that started approximately 1 hour prior to arrival.

 She has a history of panic attacks but states she has not had any episodes like

this in "a long time".  She does not take any medication for this.  Today she 

was triggered at work and became very frustrated when the onset of shortness of 

breath, anxiety and feeling tremulous started.  Prior to the panic attack she 

states she has felt generally well although has had a decreased appetite and not

drinking as much fluids.  I did personally see this patient just a few days 

prior here in the emergency room for dental pain/abscess.  She states that the 

dental concern has greatly improved and offers no concerns about this at this 

time.





Review of systems: 


As per history of present illness and below otherwise all systems reviewed and 

negative.





Past medical history: 


As per history of present illness and as reviewed below otherwise 

noncontributory.





Surgical history: 


As per history of present illness and as reviewed below otherwise 

noncontributory.





Social history: 


See social history for further information





Family history: 


As per history of present illness and as reviewed below otherwise 

noncontributory.





Physical exam:


General: Well developed and well nourished 35-year-old female.  Alert and 

appropriate for age.  Nontoxic-appearing, anxious but in no acute distress.


HEENT: Atraumatic, normocephalic, pupils equal and reactive bilaterally, 

negative for conjunctival pallor or scleral icterus, mucous membranes moist, TMs

normal bilaterally, throat clear, neck supple, nontender, trachea midline. No 

drooling or trismus noted. No meningeal signs. No hot potato voice noted. 


Lungs: Clear to auscultation, breath sounds equal bilaterally, chest nontender.


Heart: S1S2, regular rate and rhythm without overt murmur


Abdomen: Soft, nondistended, nontender. Negative for masses or 

hepatosplenomegaly. Negative for costovertebral tenderness.


Skin: Intact, warm, dry. No lesions or rashes noted.


Extremities: Atraumatic, moves all extremities per self without difficulty or 

deficits, negative for cords or calf pain. Neurovascular unremarkable.


Neuro: Awake, alert, oriented. Cranial nerves II through XII unremarkable. 

Cerebellum unremarkable. Motor and sensory unremarkable throughout. Exam 

nonfocal.





Notes:


Patient does request IV fluids as she believes she is dehydrated.  Patient's 

oral mucosa is intact.  She declines wanting any diagnostics done.  She has much

improved after the Ativan and her vital signs are stable.  I do not feel she is 

dehydrated nor needs IV fluids as she is drinking Gatorade difficulty or issues.

 We discussed the need for follow-up for further care of her anxiety/panic 

attacks.  Supportive care measures were reviewed and discussed. Voices 

understanding and is agreeable to plan of care. Denies any further questions or 

concerns at this time.





Diagnostics:


None





Therapeutics:


Ativan PO





Prescription:


None





Impression: 


Panic Attack





Plan:


1. You were given Ativan today for anxiety attack. Do not drive or take any 

additional medication that can cause drowsiness today. 


2. Please follow up with your primary care provider if you feel you need further

medication for your anxiety.


3. Return to the ED as needed as discussed. 





Definitive disposition and diagnosis as appropriate pending reevaluation and 

review of above.








- Related Data


                                    Allergies











Allergy/AdvReac Type Severity Reaction Status Date / Time


 


No Known Allergies Allergy   Verified 06/19/20 17:44











Home Meds: 


                                    Home Meds





amLODIPine [Norvasc] 10 mg PO DAILY 11/26/19 [History]


hydroCHLOROthiazide [Hydrochlorothiazide] 25 mg PO DAILY 06/15/20 [History]


Penicillin V Potassium 500 mg PO BID 06/19/20 [History]











Past Medical History





- Past Health History


Medical/Surgical History: Denies Medical/Surgical History


HEENT History: Reports: None


Other HEENT History: Ulcers in both eyes, previous abcess in R side of mouth, 

glasses


Cardiovascular History: Reports: None


Respiratory History: Reports: None


Gastrointestinal History: Reports: None


Other Gastrointestinal History: coloitis


Genitourinary History: Reports: None


OB/GYN History: Reports: None


Other OB/GYN History: migraines when menstruating


Musculoskeletal History: Reports: None


Neurological History: Reports: None


Psychiatric History: Reports: Addiction, Anxiety, Depression


Other Psychiatric History: ETOH addiction.  has been to rehab twice, quit on own

 as well


Endocrine/Metabolic History: Reports: None


Insulin Pump Model and : N/A


Hematologic History: Reports: None


Immunologic History: Reports: None


Oncologic (Cancer) History: Reports: None


Dermatologic History: Reports: None





- Infectious Disease History


Infectious Disease History: Reports: None





- Past Surgical History


Head Surgeries/Procedures: Reports: None


HEENT Surgical History: Reports: None


Cardiovascular Surgical History: Reports: None


Endocrine Surgical History: Reports: None


Neurological Surgical History: Reports: None


Musculoskeletal Surgical History: Reports: None


Dermatological Surgical History: Reports: None





Social & Family History





- Family History


Family Medical History: Noncontributory


HEENT: Reports: None


Cardiac: Reports: None


Endocrine/Metabolic: Reports: None, Diabetes, Type I





- Caffeine Use


Caffeine Use: Reports: None


Other Caffeine Use: rarely





ED ROS GENERAL





- Review of Systems


Review Of Systems: Comprehensive ROS is negative, except as noted in HPI.





ED EXAM, BEHAVIORAL HEALTH





- Physical Exam


Exam: See Below (See dictation)





COURSE, BEHAVIORAL HEALTH COMP





- Course


Vital Signs: 


                                Last Vital Signs











Temp  96.9 F   06/19/20 17:41


 


Pulse  118 H  06/19/20 17:48


 


Resp  18   06/19/20 17:48


 


BP  165/97 H  06/19/20 17:48


 


Pulse Ox  98   06/19/20 17:48











Orders, Labs, Meds: 


                               Active Orders 24 hr











 Category Date Time Status


 


 Communication Order [RC] STAT Care  06/19/20 17:46 Active


 


 Glucose [Blood Glucose Check, Bedside] [RC] ONETIME Care  06/19/20 17:52 Active








                                Laboratory Tests











  06/19/20 Range/Units





  17:57 


 


POC Glucose  118 H  ()  mg/dL








Medications














Discontinued Medications














Generic Name Dose Route Start Last Admin





  Trade Name Moni  PRN Reason Stop Dose Admin


 


Lorazepam  1 mg  06/19/20 17:45  06/19/20 17:53





  Ativan  PO  06/19/20 17:46  1 mg





  ONETIME ONE   Administration














Departure





- Departure


Time of Disposition: 22:01


Disposition: Home, Self-Care 01


Clinical Impression: 


 Panic attack








- Discharge Information


Instructions:  Panic Attack, Easy-to-Read


Referrals: 


PCP,None [Primary Care Provider] - 


Forms:  ED Department Discharge


Additional Instructions: 


The following information is given to patients seen in the emergency department 

who are being discharged to home. This information is to outline your options 

for follow-up care. We provide all patients seen in our emergency department 

with a follow-up referral.





The need for follow-up, as well as the timing and circumstances, are variable 

depending upon the specifics of your emergency department visit.





If you don't have a primary care physician on staff, we will provide you with a 

referral. We always advise you to contact your personal physician following an 

emergency department visit to inform them of the circumstance of the visit and 

for follow-up with them and/or the need for any referrals to a consulting 

specialist.





The emergency department will also refer you to a specialist when appropriate. 

This referral assures that you have the opportunity for follow-up care with a 

specialist. All of these measure are taken in an effort to provide you with 

optimal care, which includes your follow-up.





Under all circumstances we always encourage you to contact your private 

physician who remains a resource for coordinating your care. When calling for 

follow-up care, please make the office aware that this follow-up is from your 

recent emergency room visit. If for any reason you are refused follow-up, please

contact the [facility] Emergency Department

at [phone] and asked to speak to the emergency department charge nurse.





[facility]


Primary Care


[address] [place] [address]


Phone: [phone]


Fax: [phone]





[place] [address]


Phone: [phone]


Fax: [phone]





1. You were given Ativan today for anxiety attack. Do not drive or take any a

dditional medication that can cause drowsiness today. 


2. Please follow up with your primary care provider if you feel you need further

medication for your anxiety.


3. Return to the ED as needed as discussed. 





Sepsis Event Note (ED)





- Evaluation


Sepsis Screening Result: No Definite Risk





- Focused Exam


Vital Signs: 


                                   Vital Signs











  Temp Pulse Resp BP Pulse Ox


 


 06/19/20 17:48   118 H  18  165/97 H  98


 


 06/19/20 17:41  96.9 F  110 H  22 H  196/110 H  96














- My Orders


Last 24 Hours: 


My Active Orders





06/19/20 17:46


Communication Order [RC] STAT 





06/19/20 17:52


Glucose [Blood Glucose Check, Bedside] [RC] ONETIME 














- Assessment/Plan


Last 24 Hours: 


My Active Orders





06/19/20 17:46


Communication Order [] STAT 





06/19/20 17:52


Glucose [Blood Glucose Check, Bedside] [RC] ONETIME

## 2020-07-10 ENCOUNTER — HOSPITAL ENCOUNTER (EMERGENCY)
Dept: HOSPITAL 56 - MW.ED | Age: 35
Discharge: HOME | End: 2020-07-10
Payer: SELF-PAY

## 2020-07-10 VITALS — DIASTOLIC BLOOD PRESSURE: 87 MMHG | SYSTOLIC BLOOD PRESSURE: 151 MMHG

## 2020-07-10 VITALS — HEART RATE: 90 BPM

## 2020-07-10 DIAGNOSIS — Z79.899: ICD-10-CM

## 2020-07-10 DIAGNOSIS — F17.210: ICD-10-CM

## 2020-07-10 DIAGNOSIS — R00.0: ICD-10-CM

## 2020-07-10 DIAGNOSIS — E83.42: Primary | ICD-10-CM

## 2020-07-10 DIAGNOSIS — I10: ICD-10-CM

## 2020-07-10 LAB
BUN SERPL-MCNC: 7 MG/DL (ref 7–18)
CHLORIDE SERPL-SCNC: 94 MMOL/L (ref 98–107)
CO2 SERPL-SCNC: 24.8 MMOL/L (ref 21–32)
GLUCOSE SERPL-MCNC: 87 MG/DL (ref 74–106)
POTASSIUM SERPL-SCNC: 3.5 MMOL/L (ref 3.5–5.1)
SODIUM SERPL-SCNC: 136 MMOL/L (ref 136–145)

## 2020-07-10 PROCEDURE — 83735 ASSAY OF MAGNESIUM: CPT

## 2020-07-10 PROCEDURE — 85025 COMPLETE CBC W/AUTO DIFF WBC: CPT

## 2020-07-10 PROCEDURE — 99285 EMERGENCY DEPT VISIT HI MDM: CPT

## 2020-07-10 PROCEDURE — 96375 TX/PRO/DX INJ NEW DRUG ADDON: CPT

## 2020-07-10 PROCEDURE — 71275 CT ANGIOGRAPHY CHEST: CPT

## 2020-07-10 PROCEDURE — 36415 COLL VENOUS BLD VENIPUNCTURE: CPT

## 2020-07-10 PROCEDURE — 93005 ELECTROCARDIOGRAM TRACING: CPT

## 2020-07-10 PROCEDURE — 84703 CHORIONIC GONADOTROPIN ASSAY: CPT

## 2020-07-10 PROCEDURE — 96365 THER/PROPH/DIAG IV INF INIT: CPT

## 2020-07-10 PROCEDURE — 80053 COMPREHEN METABOLIC PANEL: CPT

## 2020-07-10 NOTE — CT
CT chest

 

Technique: Multiple axial sections through the chest were obtained.  

Intravenous contrast was utilized.  Study performed as pulmonary 

angiogram protocol.

 

Findings: Pulmonary arteries are not optimally opacified.  No filling 

defects are seen within the main or segmental branches.  Smaller 

subsegmental pulmonary emboli could be missed.  

 

Aorta shows no aneurysm.  Mediastinum and hilar regions show no 

adenopathy.  No pericardial thickening is seen.  No coronary artery 

calcification is appreciated.  No axillary adenopathy is noted.  

Visualized upper abdominal structures showed no discrete abnormality. 

 

 

Probable fatty infiltration is present within the liver.

 

Multiple small subpleural nodules are seen within the superior segment

 of the left lower lung.  Largest measures about 2-3 mm.  These are 

most likely of benign etiology.  

 

No acute parenchymal process is otherwise seen.  No pleural effusions 

are seen.

 

Bone window settings were reviewed which shows no acute osseous 

finding.

 

Impression:

1.  Suboptimal opacification the pulmonary arteries.  No findings of 

pulmonary embolism within the main or segmental branches.  Smaller 

subsegmental pulmonary emboli could be missed.

2.  Multiple small subpleural nodules within the superior segment of 

the left lower lung.  Largest measures around 2-3 mm and these are 

felt to be of benign etiology.

3.  Nothing acute is otherwise seen on CT study of the chest.

 

Diagnostic code #2

 

This report was dictated in MDT

## 2020-07-10 NOTE — EDM.PDOC
ED HPI GENERAL MEDICAL PROBLEM





- General


Chief Complaint: General


Stated Complaint: EMS ARRIVAL


Time Seen by Provider: 07/10/20 19:28





- History of Present Illness


INITIAL COMMENTS - FREE TEXT/NARRATIVE: 





History of present illness:


35-year-old female brought by EMS presenting with hand cramping in both hands 

and feeling very nervous.  The patient is breathing quickly.  She does report 

this is happened in the past from hyperpnea.  She does smoke cigarettes.  No 

chest pain or difficulty breathing.





Review of systems: 


As per history of present illness and below otherwise all systems reviewed and 

negative.





Past medical history: 


As per history of present illness and as reviewed below otherwise 

noncontributory.  Hypertension





Surgical history: 


As per history of present illness and as reviewed below otherwise noncontributo

ry.





Social history: 


No reported history of drug.  Occasional alcohol.  Cigarettes





Family history: 


As per history of present illness and as reviewed below otherwise 

noncontributory.





Physical exam:


GEN: no acute distress, well appearing


HEENT: Atraumatic, normocephalic, mucous membranes moist, 


Neck: supple, nontender, trachea midline.


Lungs: No respiratory distress.


Heart: RRR


Abdomen: Soft, nondistended, nontender. 


Back: nontender


Extremities: Atraumatic. Neurovascularly intact.  Both hands with carpal spasm, 

however they are able to be opened passively.


Neuro: Awake, alert, oriented. Neuro Exam nonfocal.


Skin: warm, dry, no lesions





Diagnostics:


Labs EKG





Therapeutics:


IV fluids, Ativan





MDM: 





Impression: 


[]





Plan:


[]





Definitive disposition and diagnosis as appropriate pending reevaluation and 

review of above.








  ** bilateral hand


Pain Score (Numeric/FACES): 10





- Related Data


                                    Allergies











Allergy/AdvReac Type Severity Reaction Status Date / Time


 


No Known Allergies Allergy   Verified 07/10/20 19:29











Home Meds: 


                                    Home Meds





amLODIPine [Norvasc] 10 mg PO DAILY 11/26/19 [History]


hydroCHLOROthiazide [Hydrochlorothiazide] 25 mg PO DAILY 06/15/20 [History]











Past Medical History





- Past Health History


Medical/Surgical History: Denies Medical/Surgical History


HEENT History: Reports: None


Other HEENT History: Ulcers in both eyes, previous abcess in R side of mouth, 

glasses


Cardiovascular History: Reports: Hypertension


Respiratory History: Reports: None


Gastrointestinal History: Reports: None


Other Gastrointestinal History: coloitis


Genitourinary History: Reports: None


OB/GYN History: Reports: None


Other OB/GYN History: migraines when menstruating


Musculoskeletal History: Reports: None


Neurological History: Reports: None


Psychiatric History: Reports: Addiction, Anxiety, Depression


Other Psychiatric History: ETOH addiction.  has been to rehab twice, quit on own

 as well


Endocrine/Metabolic History: Reports: None


Insulin Pump Model and : N/A


Hematologic History: Reports: None


Immunologic History: Reports: None


Oncologic (Cancer) History: Reports: None


Dermatologic History: Reports: None





- Infectious Disease History


Infectious Disease History: Reports: Chicken Pox





- Past Surgical History


Head Surgeries/Procedures: Reports: None


HEENT Surgical History: Reports: None


Cardiovascular Surgical History: Reports: None


Endocrine Surgical History: Reports: None


Neurological Surgical History: Reports: None


Musculoskeletal Surgical History: Reports: None


Dermatological Surgical History: Reports: None





Social & Family History





- Family History


Family Medical History: Noncontributory


HEENT: Reports: None


Cardiac: Reports: None


Endocrine/Metabolic: Reports: None, Diabetes, Type I





- Tobacco Use


Smoking Status *Q: Current Every Day Smoker


Years of Tobacco use: 15


Packs/Tins Daily: 1





- Caffeine Use


Caffeine Use: Reports: None


Other Caffeine Use: rarely





- Recreational Drug Use


Recreational Drug Use: No





ED ROS GENERAL





- Review of Systems


Review Of Systems: See Below (See HPI)





ED EXAM, GENERAL





- Physical Exam


Exam: See Below (See HPI)





EKG INTERPRETATION


EKG Interpretation Comments: 





EKG performed today at 7:29 PM, sinus tachycardia, 115, left atrial enlargement,

 right axis deviation, no STEMI.  Interpreted by me.





Course





- Vital Signs


Text/Narrative:: 





Carpal spasms, patient extremely anxious.  Given IV fluids and Ativan with 

significant improvement in her symptoms.  However her magnesium is low.  That 

will be repleted.  Potassium and calcium within normal limits.


Last Recorded V/S: 


                                Last Vital Signs











Temp  97.6 F   07/10/20 19:30


 


Pulse  90   07/10/20 23:11


 


Resp  18   07/10/20 22:01


 


BP  151/87 H  07/10/20 22:31


 


Pulse Ox  94 L  07/10/20 22:01














- Orders/Labs/Meds


Orders: 


                               Active Orders 24 hr











 Category Date Time Status


 


 EKG 12 Lead [EKG Documentation Completion] [RC] STAT Care  07/10/20 21:43 

Active


 


 Saline Lock Insert [OM.PC] Stat Oth  07/10/20 19:28 Ordered











Labs: 


                                Laboratory Tests











  07/10/20 07/10/20 07/10/20 Range/Units





  19:27 19:27 19:27 


 


WBC  12.36 H    (4.0-11.0)  K/uL


 


RBC  4.76    (4.30-5.90)  M/uL


 


Hgb  16.4 H    (12.0-16.0)  g/dL


 


Hct  47.0 H    (36.0-46.0)  %


 


MCV  98.7 H    (80.0-98.0)  fL


 


MCH  34.5 H    (27.0-32.0)  pg


 


MCHC  34.9    (31.0-37.0)  g/dL


 


RDW Std Deviation  47.9    (28.0-62.0)  fl


 


RDW Coeff of Flo  13    (11.0-15.0)  %


 


Plt Count  203    (150-400)  K/uL


 


MPV  9.50    (7.40-12.00)  fL


 


Neut % (Auto)  73.6    (48.0-80.0)  %


 


Lymph % (Auto)  18.0    (16.0-40.0)  %


 


Mono % (Auto)  7.6    (0.0-15.0)  %


 


Eos % (Auto)  0.6    (0.0-7.0)  %


 


Baso % (Auto)  0.2    (0.0-1.5)  %


 


Neut # (Auto)  9.1 H    (1.4-5.7)  K/uL


 


Lymph # (Auto)  2.2    (0.6-2.4)  K/uL


 


Mono # (Auto)  0.9 H    (0.0-0.8)  K/uL


 


Eos # (Auto)  0.1    (0.0-0.7)  K/uL


 


Baso # (Auto)  0.0    (0.0-0.1)  K/uL


 


Nucleated RBC %  0.0    /100WBC


 


Nucleated RBCs #  0    K/uL


 


Sodium   136   (136-145)  mmol/L


 


Potassium   3.5   (3.5-5.1)  mmol/L


 


Chloride   94 L   ()  mmol/L


 


Carbon Dioxide   24.8   (21.0-32.0)  mmol/L


 


BUN   7   (7.0-18.0)  mg/dL


 


Creatinine   0.7   (0.6-1.0)  mg/dL


 


Est Cr Clr Drug Dosing   98.71   mL/min


 


Estimated GFR (MDRD)   > 60.0   ml/min


 


Glucose   87   ()  mg/dL


 


Calcium   9.1   (8.5-10.1)  mg/dL


 


Magnesium   1.0 L   (1.8-2.4)  mg/dL


 


Total Bilirubin   0.9   (0.2-1.0)  mg/dL


 


AST   51 H   (15-37)  IU/L


 


ALT   46   (14-63)  IU/L


 


Alkaline Phosphatase   84   ()  U/L


 


Total Protein   8.3 H   (6.4-8.2)  g/dL


 


Albumin   4.0   (3.4-5.0)  g/dL


 


Globulin   4.3 H   (2.6-4.0)  g/dL


 


Albumin/Globulin Ratio   0.9   (0.9-1.6)  


 


HCG, Qual    NEGATIVE  (NEG)  











Meds: 


Medications














Discontinued Medications














Generic Name Dose Route Start Last Admin





  Trade Name Jakubq  PRN Reason Stop Dose Admin


 


Sodium Chloride  1,000 mls @ 999 mls/hr  07/10/20 19:28  07/10/20 19:41





  Normal Saline  IV  07/10/20 20:28  999 mls/hr





  .Bolus ONE   Administration


 


Magnesium Sulfate 1 gm/ Sodium  52 mls @ 104 mls/hr  07/10/20 20:45  07/10/20 

21:05





  Chloride  IV  07/10/20 21:14  Not Given





  ONETIME ONE  


 


Magnesium Sulfate 2 gm/ Premix  50 mls @ 50 mls/hr  07/10/20 20:58  07/10/20 

21:03





  IV  07/10/20 21:57  50 mls/hr





  ONETIME ONE   Administration


 


Magnesium Sulfate  Confirm  07/10/20 20:58  07/10/20 21:05





  Magnesium Sulfate In Water Premix  Administered  07/10/20 20:59  Not Given





  Dose  





  50 mls @ as directed  





  .ROUTE  





  .STK-MED ONE  


 


Sodium Chloride  1,000 mls @ 999 mls/hr  07/10/20 22:27  07/10/20 22:35





  Normal Saline  IV  07/10/20 23:27  999 mls/hr





  .BOLUS ONE   Administration


 


Iopamidol  100 ml  07/10/20 22:32  07/10/20 22:38





  Isovue-370 (76%)  IVPUSH  07/10/20 22:33  100 ml





  ONETIME ONE   Administration


 


Lorazepam  1 mg  07/10/20 19:28  07/10/20 20:08





  Ativan  PO  07/10/20 19:29  Not Given





  ONETIME ONE  


 


Lorazepam  1 mg  07/10/20 19:47  07/10/20 19:50





  Ativan  IVPUSH  07/10/20 19:48  1 mg





  ONETIME ONE   Administration


 


Sodium Chloride  10 ml  07/10/20 19:28 





  Saline Flush  FLUSH  





  ASDIRECTED PRN  





  Keep Vein Open  


 


Sodium Chloride  2.5 ml  07/10/20 19:28 





  Saline Flush  FLUSH  





  ASDIRECTED PRN  





  Keep Vein Open  














- Re-Assessments/Exams


Free Text/Narrative Re-Assessment/Exam: 





07/10/20 20:15


Reassessed the patient, she is in no acute distress.  Her hands are now moving 

freely without any limitation or spasm.  She does report she is feeling much 

better and much less nervous.


07/10/20 21:57


The patient is feeling well.  She has now received a full IV magnesium infusion.

 Resting and comfortably in no acute distress.  Plan was to discharge at this 

time, however the patient is still tachycardic in the 105-110 range.  She does 

not feel her heart racing, she has not been short of breath or with any chest 

pain.  She does smoke and she has a history of asthma.  She is not on any OCPs. 

She has not had any fevers or any recent illness.  During my exam she did have 

mild hypoxia, 93 to 94% on bedside pulse oximetry without any difficulty 

breathing.  Due to her ongoing tachycardia, will check a CT Rina of the chest 

to evaluate for possible pulmonary embolism.





07/10/20 23:04


CT scan shows no pulmonary embolism although bolus was not entirely correctly 

timed, however patient is well-appearing and in no acute distress with no 

difficulty breathing.  Heart rate on reexamination is 97.  CT scan does also 

show some pulmonary nodules which the radiologist reports are benign, this was 

discussed with the patient as well as recommendation for smoking cessation.  She

does agree to start to consider it.  The patient will be discharged.











Departure





- Departure


Time of Disposition: 23:29


Disposition: Home, Self-Care 01


Clinical Impression: 


 Hand or foot spasms, Hypomagnesemia, Tachycardia








- Discharge Information


Instructions:  Muscle Cramps and Spasms, Easy-to-Read, Hypomagnesemia, Sinus 

Tachycardia


Referrals: 


PCP,None [Primary Care Provider] - 


Forms:  ED Department Discharge


Additional Instructions: 


The following information is given to patients seen in the emergency department 

who are being discharged to home. This information is to outline your options 

for follow-up care. We provide all patients seen in our emergency department 

with a follow-up referral.





The need for follow-up, as well as the timing and circumstances, are variable 

depending upon the specifics of your emergency department visit.





If you don't have a primary care physician on staff, we will provide you with a 

referral. We always advise you to contact your personal physician following an 

emergency department visit to inform them of the circumstance of the visit and 

for follow-up with them and/or the need for any referrals to a consulting 

specialist.





The emergency department will also refer you to a specialist when appropriate. 

This referral assures that you have the opportunity for follow-up care with a 

specialist. All of these measure are taken in an effort to provide you with 

optimal care, which includes your follow-up.





Under all circumstances we always encourage you to contact your private 

physician who remains a resource for coordinating your care. When calling for 

follow-up care, please make the office aware that this follow-up is from your 

recent emergency room visit. If for any reason you are refused follow-up, please

contact the [facility] Emergency Department

at [phone] and asked to speak to the emergency department charge nurse.








[facility] - Primary Care


[address] [place] [id number]


Phone: [phone]


Fax: [phone]








[facility]


[address] [place] [address]


Phone: [phone]


Fax: [phone]














Sepsis Event Note (ED)





- Evaluation


Sepsis Screening Result: No Definite Risk





- Focused Exam


Vital Signs: 


                                   Vital Signs











  Temp Pulse Resp BP Pulse Ox


 


 07/10/20 23:11   90   


 


 07/10/20 22:31   100   151/87 H 


 


 07/10/20 22:01   101 H  18  139/90  94 L


 


 07/10/20 19:55   113 H  18  159/87 H  95


 


 07/10/20 19:30  97.6 F  127 H  22 H  156/93 H  97














- My Orders


Last 24 Hours: 


My Active Orders





07/10/20 19:28


Saline Lock Insert [OM.PC] Stat 





07/10/20 21:43


EKG 12 Lead [EKG Documentation Completion] [RC] STAT 














- Assessment/Plan


Last 24 Hours: 


My Active Orders





07/10/20 19:28


Saline Lock Insert [OM.PC] Stat 





07/10/20 21:43


EKG 12 Lead [EKG Documentation Completion] [RC] STAT

## 2020-07-20 ENCOUNTER — HOSPITAL ENCOUNTER (EMERGENCY)
Dept: HOSPITAL 56 - MW.ED | Age: 35
Discharge: HOME | End: 2020-07-20
Payer: SELF-PAY

## 2020-07-20 VITALS — HEART RATE: 91 BPM | SYSTOLIC BLOOD PRESSURE: 117 MMHG | DIASTOLIC BLOOD PRESSURE: 67 MMHG

## 2020-07-20 DIAGNOSIS — I10: ICD-10-CM

## 2020-07-20 DIAGNOSIS — Z79.899: ICD-10-CM

## 2020-07-20 DIAGNOSIS — F41.0: Primary | ICD-10-CM

## 2020-07-20 PROCEDURE — 99283 EMERGENCY DEPT VISIT LOW MDM: CPT

## 2020-07-20 NOTE — EDM.PDOC
ED HPI GENERAL MEDICAL PROBLEM





- General


Chief Complaint: General


Stated Complaint: PANIC ATTACK; ARMS NUMB


Time Seen by Provider: 07/20/20 10:21


Source of Information: Reports: Patient


History Limitations: Reports: No Limitations





- History of Present Illness


INITIAL COMMENTS - FREE TEXT/NARRATIVE: 


HISTORY AND PHYSICAL:





History of present illness:


Patient is a 35-year-old female who presents to the emergency room with 

complaints of feeling anxious, shortness of breath, tingling sensation in 

bilateral hands and tearful since this morning.  She does have a history of 

anxiety and had previously tried several medications but ultimately stopped 

taking anything due to side effects.  States she was last on Lexapro 

approximately 3 to 4 months ago.  She does have panic attacks once to twice a 

month.  She does not recall anything to trigger her panic attack.  Denies any 

injury, trauma or falls.  Patient denies any fever, chills, headache, change in 

vision, syncope or near syncope. Denies any chest pain, back pain or cough. 

Denies any abdominal pain, nausea, vomiting, diarrhea, constipation or dysuria. 

Denies any chance of pregnancy. patient has been eating and drinking a

ppropriately.





Review of systems: 


As per history of present illness and below otherwise all systems reviewed and 

negative.





Past medical history: 


As per history of present illness and as reviewed below otherwise 

noncontributory.





Surgical history: 


As per history of present illness and as reviewed below otherwise 

noncontributory.





Social history: 


See social history for further information





Family history: 


As per history of present illness and as reviewed below otherwise 

noncontributory.





Physical exam:


General: Well-developed and well-nourished 35-year-old female.  Alert and 

oriented.  Tearful, nontoxic-appearing and in no acute distress.


HEENT: Atraumatic, normocephalic, pupils equal and reactive bilaterally, 

negative for conjunctival pallor or scleral icterus, mucous membranes moist, 

trachea midline. No drooling or trismus noted. No meningeal signs. No hot potato

voice noted. 


Lungs: Clear to auscultation, breath sounds equal bilaterally, chest nontender.


Heart: S1S2, regular rate and rhythm without overt murmur


Abdomen: Soft, nondistended, nontender. Negative for masses or 

hepatosplenomegaly. Negative for costovertebral tenderness.


Skin: Intact, warm, dry. No lesions or rashes noted.


Extremities: Atraumatic, moves all extremities per self without difficulty or 

deficits, negative for cords or calf pain. Neurovascular unremarkable.


Neuro: Awake, alert, oriented. Cranial nerves II through XII unremarkable. 

Cerebellum unremarkable. Motor and sensory unremarkable throughout. Exam 

nonfocal.





Notes:


Patient is confident that her symptoms are related to a panic attack, stating 

she has had these exact symptoms previous with prior panic attacks.  We 

discussed lab work versus p.o. medication trial and reassessing.  At this time 

we will do 1 mg of Ativan p.o. and allow her to rest for 30 to 40 minutes and 

then will reassess at that time.





Patient reports that she feels improved and offers no current complaints or 

concerns.  She states she will follow-up with Sharona Cota, who had previously 

put her on Lexapro for her anxiety.  We discussed signs and symptoms that would 

prompt her to return to the emergency room.  Currently her physical exam is 

within normal limits and she is appropriate for discharge.  I will give her 10 

tablets of Atarax in case she has another panic attack before she can get into 

the clinic. Supportive care measures were reviewed and discussed. Voices 

understanding and is agreeable to plan of care. Denies any further questions or 

concerns at this time.





Diagnostics:


None





Therapeutics:


Ativan





Prescription:


Atarax (#10)





Impression: 


Panic Attack





Plan:


1. Today you were seen for a panic attack. You were given Ativan while here in 

the ED. A limited amount of Atarax (similar medication) has been prescribed for 

you to use when you feel like your anxiety is increasing or you are going to 

have a panic attack. This medication may cause drowsiness, so do not take while 

driving or needing to be functioning outside the house. If your panic 

attacks/anxiety becomes more frequent or you need refills - you MUST see your 

primary care provider for medication refill or discuss management alternatives.


2. Try to decrease stressors at home.


3. Follow up with Sharona Andrade, your PCP as we discussed. Return to the ED as 

needed as discussed.





Definitive disposition and diagnosis as appropriate pending reevaluation and 

review of above.








- Related Data


                                    Allergies











Allergy/AdvReac Type Severity Reaction Status Date / Time


 


No Known Allergies Allergy   Verified 07/10/20 19:29











Home Meds: 


                                    Home Meds





amLODIPine [Norvasc] 10 mg PO DAILY 11/26/19 [History]


hydroCHLOROthiazide [Hydrochlorothiazide] 25 mg PO DAILY 06/15/20 [History]


Albuterol Sulfate [Albuterol Sulfate Hfa] 1 inh INH DAILY PRN 07/20/20 [History]


hydrOXYzine HCL [Hydroxyzine HCl] 50 mg PO TID PRN #10 tablet 07/20/20 [Rx]











Past Medical History





- Past Health History


Medical/Surgical History: Denies Medical/Surgical History


HEENT History: Reports: None


Other HEENT History: Ulcers in both eyes, previous abcess in R side of mouth, 

glasses


Cardiovascular History: Reports: Hypertension


Respiratory History: Reports: None


Gastrointestinal History: Reports: None


Other Gastrointestinal History: coloitis


Genitourinary History: Reports: None


OB/GYN History: Reports: None


Other OB/GYN History: migraines when menstruating


Musculoskeletal History: Reports: None


Neurological History: Reports: None


Psychiatric History: Reports: Addiction, Anxiety, Depression


Other Psychiatric History: ETOH addiction.  has been to rehab twice, quit on own

 as well


Endocrine/Metabolic History: Reports: None


Insulin Pump Model and : N/A


Hematologic History: Reports: None


Immunologic History: Reports: None


Oncologic (Cancer) History: Reports: None


Dermatologic History: Reports: None





- Infectious Disease History


Infectious Disease History: Reports: Chicken Pox





- Past Surgical History


Head Surgeries/Procedures: Reports: None


HEENT Surgical History: Reports: None


Cardiovascular Surgical History: Reports: None


Endocrine Surgical History: Reports: None


Neurological Surgical History: Reports: None


Musculoskeletal Surgical History: Reports: None


Dermatological Surgical History: Reports: None





Social & Family History





- Family History


Family Medical History: Noncontributory


HEENT: Reports: None


Cardiac: Reports: None


Endocrine/Metabolic: Reports: None, Diabetes, Type I





- Caffeine Use


Caffeine Use: Reports: None


Other Caffeine Use: rarely





ED ROS GENERAL





- Review of Systems


Review Of Systems: Comprehensive ROS is negative, except as noted in HPI.





ED EXAM, GENERAL





- Physical Exam


Exam: See Below (See dictation)





Course





- Vital Signs


Last Recorded V/S: 


                                Last Vital Signs











Temp  96.9 F   07/20/20 10:23


 


Pulse  97   07/20/20 10:23


 


Resp  20   07/20/20 10:23


 


BP  143/86 H  07/20/20 10:23


 


Pulse Ox  95   07/20/20 10:23














- Orders/Labs/Meds


Meds: 


Medications














Discontinued Medications














Generic Name Dose Route Start Last Admin





  Trade Name Freq  PRN Reason Stop Dose Admin


 


Lorazepam  1 mg  07/20/20 10:36  07/20/20 10:42





  Ativan  PO  07/20/20 10:37  1 mg





  ONETIME ONE   Administration














Departure





- Departure


Time of Disposition: 10:56


Disposition: Home, Self-Care 01


Clinical Impression: 


 Panic attack








- Discharge Information


Prescriptions: 


hydrOXYzine HCL [Hydroxyzine HCl] 50 mg PO TID PRN #10 tablet


 PRN Reason: Anxiety


Instructions:  Panic Attack, Easy-to-Read


Referrals: 


PCP,Unknown [Primary Care Provider] - 


Forms:  ED Department Discharge


Additional Instructions: 


The following information is given to patients seen in the emergency department 

who are being discharged to home. This information is to outline your options 

for follow-up care. We provide all patients seen in our emergency department 

with a follow-up referral.





The need for follow-up, as well as the timing and circumstances, are variable 

depending upon the specifics of your emergency department visit.





If you don't have a primary care physician on staff, we will provide you with a 

referral. We always advise you to contact your personal physician following an 

emergency department visit to inform them of the circumstance of the visit and 

for follow-up with them and/or the need for any referrals to a consulting 

specialist.





The emergency department will also refer you to a specialist when appropriate. 

This referral assures that you have the opportunity for follow-up care with a 

specialist. All of these measure are taken in an effort to provide you with 

optimal care, which includes your follow-up.





Under all circumstances we always encourage you to contact your private 

physician who remains a resource for coordinating your care. When calling for 

follow-up care, please make the office aware that this follow-up is from your 

recent emergency room visit. If for any reason you are refused follow-up, please

contact the [facility] Emergency Department

at [phone] and asked to speak to the emergency department charge nurse.





[facility]


Primary Care


[address] [place] [address]


Phone: [phone]


Fax: [phone]





[address] [address]


Phone: [phone]


Fax: [phone]





Thank you for choosing the [facility] emergency department in 

[place] for your medical needs today.  It was a pleasure caring for you.





You were seen in the emergency department for Panic Attack.





1. Today you were seen for a panic attack. You were given Ativan while here in 

the ED. A limited amount of Atarax (similar medication) has been prescribed for 

you to use when you feel like your anxiety is increasing or you are going to 

have a panic attack. This medication may cause drowsiness, so do not take while 

driving or needing to be functioning outside the house. If your panic 

attacks/anxiety becomes more frequent or you need refills - you MUST see your 

primary care provider for medication refill or discuss management alternatives.


2. Try to decrease stressors at home.


3. Follow up with Sharona Andrade, your PCP as we discussed. Return to the ED as 

needed as discussed.





Sepsis Event Note (ED)





- Evaluation


Sepsis Screening Result: No Definite Risk





- Focused Exam


Vital Signs: 


                                   Vital Signs











  Temp Pulse Resp BP Pulse Ox


 


 07/20/20 10:23  96.9 F  97  20  143/86 H  95

## 2020-07-23 ENCOUNTER — HOSPITAL ENCOUNTER (EMERGENCY)
Dept: HOSPITAL 56 - MW.ED | Age: 35
Discharge: HOME | End: 2020-07-23
Payer: SELF-PAY

## 2020-07-23 VITALS — DIASTOLIC BLOOD PRESSURE: 72 MMHG | SYSTOLIC BLOOD PRESSURE: 119 MMHG

## 2020-07-23 VITALS — SYSTOLIC BLOOD PRESSURE: 137 MMHG | DIASTOLIC BLOOD PRESSURE: 75 MMHG | HEART RATE: 97 BPM

## 2020-07-23 VITALS — HEART RATE: 99 BPM

## 2020-07-23 DIAGNOSIS — J45.909: ICD-10-CM

## 2020-07-23 DIAGNOSIS — I10: ICD-10-CM

## 2020-07-23 DIAGNOSIS — F41.9: Primary | ICD-10-CM

## 2020-07-23 DIAGNOSIS — Z79.899: ICD-10-CM

## 2020-07-23 DIAGNOSIS — F17.210: ICD-10-CM

## 2020-07-23 DIAGNOSIS — J45.901: Primary | ICD-10-CM

## 2020-07-23 PROCEDURE — 99284 EMERGENCY DEPT VISIT MOD MDM: CPT

## 2020-07-23 PROCEDURE — 94640 AIRWAY INHALATION TREATMENT: CPT

## 2020-07-23 NOTE — EDM.PDOC
ED HPI GENERAL MEDICAL PROBLEM





- General


Chief Complaint: Respiratory Problem


Stated Complaint: SOB


Time Seen by Provider: 07/23/20 11:59


Source of Information: Reports: Patient


History Limitations: Reports: No Limitations





- History of Present Illness


INITIAL COMMENTS - FREE TEXT/NARRATIVE: 





35-year-old female with history of anxiety, HTN presents with anxiety attack 

today.  Associated with mild dyspnea.  She drinks alcohol every day for the last

month.  She drank today.  She has a clinic appointment tomorrow.  She did not 

have a PCP previously.  She denies fever, chills, chest pain, abdominal pain, 

nausea, vomiting, diarrhea, headache.





ROS: A 10-point review of systems, other than pertinent positives and negatives 

as stated per HPI, is otherwise negative





Past medical history: No additional pertinent history


Past Surgical history: No additional pertinent history


Social history: No additional pertinent history


Family history: No additional pertinent history


________________________________________________________________________________





PHYSICAL EXAM





General: AOx4, GCS = 15, No distress


HEENT: dry mucous membrane


Neck: supple, no meningismus, no Kernig or Brudzinski


Cardiac: S1S2 RRR


Respiratory: CTAB, no crackles or rales, no wheezing


Abdomen: Soft, nontender, no rebound or guarding, nondistended, no pulsatile 

mass.


Back: nontender


Musculoskeletal: NVI distally, no deformity


Neuro: No focal deficits, CN 2 - 12 WNL.








  ** chest


Pain Score (Numeric/FACES): 5





- Related Data


                                    Allergies











Allergy/AdvReac Type Severity Reaction Status Date / Time


 


No Known Allergies Allergy   Verified 07/23/20 11:20











Home Meds: 


                                    Home Meds





amLODIPine [Norvasc] 10 mg PO DAILY 11/26/19 [History]


hydroCHLOROthiazide [Hydrochlorothiazide] 25 mg PO DAILY 06/15/20 [History]


Albuterol Sulfate [Albuterol Sulfate Hfa] 8.5 gm IH Q4HR #1 hfa.aer.ad 07/23/20 

[Rx]











Past Medical History





- Past Health History


Medical/Surgical History: Denies Medical/Surgical History


HEENT History: Reports: None


Other HEENT History: Ulcers in both eyes, previous abcess in R side of mouth, 

glasses


Cardiovascular History: Reports: Hypertension


Respiratory History: Reports: Asthma


Gastrointestinal History: Reports: None


Other Gastrointestinal History: coloitis


Genitourinary History: Reports: None


OB/GYN History: Reports: None


Other OB/GYN History: migraines when menstruating


Musculoskeletal History: Reports: None


Neurological History: Reports: None


Psychiatric History: Reports: Addiction, Anxiety, Depression


Other Psychiatric History: ETOH addiction.  has been to rehab twice, quit on own

 as well


Endocrine/Metabolic History: Reports: None


Insulin Pump Model and : N/A


Hematologic History: Reports: None


Immunologic History: Reports: None


Oncologic (Cancer) History: Reports: None


Dermatologic History: Reports: None





- Infectious Disease History


Infectious Disease History: Reports: Chicken Pox





- Past Surgical History


Head Surgeries/Procedures: Reports: None


HEENT Surgical History: Reports: None


Cardiovascular Surgical History: Reports: None


Endocrine Surgical History: Reports: None


Neurological Surgical History: Reports: None


Musculoskeletal Surgical History: Reports: None


Dermatological Surgical History: Reports: None





Social & Family History





- Family History


Family Medical History: Noncontributory


HEENT: Reports: None


Cardiac: Reports: None


Endocrine/Metabolic: Reports: None, Diabetes, Type I





- Tobacco Use


Smoking Status *Q: Current Every Day Smoker


Years of Tobacco use: 12


Packs/Tins Daily: 1





- Caffeine Use


Caffeine Use: Reports: None


Other Caffeine Use: rarely





- Alcohol Use


Days Per Week of Alcohol Use: 7


Number of Drinks Per Day: 4


Total Drinks Per Week: 28





- Recreational Drug Use


Recreational Drug Use: No





ED ROS GENERAL





- Review of Systems


Review Of Systems: Comprehensive ROS is negative, except as noted in HPI.





ED EXAM, GENERAL





- Physical Exam


Exam: See Below (see dictation)





Course





- Vital Signs


Last Recorded V/S: 





                                Last Vital Signs











Temp  98.1 F   07/23/20 11:18


 


Pulse  102 H  07/23/20 11:18


 


Resp  22 H  07/23/20 11:18


 


BP  125/72   07/23/20 11:18


 


Pulse Ox  94 L  07/23/20 11:18














- Re-Assessments/Exams


Free Text/Narrative Re-Assessment/Exam: 





07/23/20 12:20


After treatments and a prolonged observation period in the ER, the patient 

improved clinically and is stable for discharge. I performed a repeat 

examination and the patient has not demonstrated any new abnormal findings. 

Patient exhibits normal vital signs and has exhibited a normal gait. I advised 

the patient to return to the ER for reevaluation if symptoms worsened, and to 

follow up with their PCP tomorrow. 





________________________________________________________________________________





MEDICAL DECISION MAKING: I reviewed the patients past medical records, lab and 

radiographic findings. I discussed the case with the patient. My differential 

diagnosis included: Patient symptoms are clinically consistent with anxiety, her

 lungs are clear to auscultation bilaterally, her vital signs are unremarkable. 

 She was improved after resting in her bed for a prolonged period.





Departure





- Departure


Time of Disposition: 12:18


Disposition: Home, Self-Care 01


Condition: Good


Clinical Impression: 


 Anxiety








- Discharge Information


*PRESCRIPTION DRUG MONITORING PROGRAM REVIEWED*: Not Applicable


*COPY OF PRESCRIPTION DRUG MONITORING REPORT IN PATIENT MIGUEL: Not Applicable


Instructions:  Living With Anxiety


Referrals: 


PCP,None [Primary Care Provider] - 1 Day


Additional Instructions: 


The following information is given to patients seen in the emergency department 

who are being discharged to home. This information is to outline your options 

for follow-up care. We provide all patients seen in our emergency department 

with a follow-up referral.





The need for follow-up, as well as the timing and circumstances, are variable 

depending upon the specifics of your emergency department visit.





If you don't have a primary care physician on staff, we will provide you with a 

referral. We always advise you to contact your personal physician following an 

emergency department visit to inform them of the circumstance of the visit and 

for follow-up with them and/or the need for any referrals to a consulting 

specialist.





The emergency department will also refer you to a specialist when appropriate. 

This referral assures that you have the opportunity for follow-up care with a 

specialist. All of these measure are taken in an effort to provide you with 

optimal care, which includes your follow-up.





Under all circumstances we always encourage you to contact your private 

physician who remains a resource for coordinating your care. When calling for 

follow-up care, please make the office aware that this follow-up is from your 

recent emergency room visit. If for any reason you are refused follow-up, please

contact the [facility]

at [phone] and asked to speak to the emergency department charge nurse.





If you do not have a primary care doctor, please follow up with the clinics 

below within 3-5 days. 





[facility]


[address] [place] [id number]


Phone: [phone]


Fax: [phone]





[facility]


[address] [place] [address]


Phone: [phone]


Fax: [phone]








Sepsis Event Note (ED)





- Evaluation


Sepsis Screening Result: No Definite Risk





- Focused Exam


Vital Signs: 





                                   Vital Signs











  Temp Pulse Resp BP Pulse Ox


 


 07/23/20 11:18  98.1 F  102 H  22 H  125/72  94 L

## 2020-07-23 NOTE — EDM.PDOC
ED HPI GENERAL MEDICAL PROBLEM





- General


Chief Complaint: Respiratory Problem


Stated Complaint: SHORTNESS OF BREATH


Time Seen by Provider: 07/23/20 01:17





- History of Present Illness


INITIAL COMMENTS - FREE TEXT/NARRATIVE: 





History of present illness:


Patient presents with 2 hours of increased shortness of breath consistent with 

her normal asthma symptoms she is a smoker she does not want to quit smoking she

denies any fever chills body aches but she has had a dry cough that she always 

has chronically with her smoking she denies any chest pain although she does 

have some pain in the left flank of her thorax with coughing only nothing seems 

to make it better or worse





Review of systems: 


As per history of present illness and below otherwise all systems reviewed and 

negative.





Past medical history: 


As per history of present illness and as reviewed below otherwise 

noncontributory.





Surgical history: 


As per history of present illness and as reviewed below otherwise 

noncontributory.





Social history: 


No reported history of drug or alcohol abuse.





Family history: 


As per history of present illness and as reviewed below otherwise 

noncontributory.





Physical exam:


HEENT: Atraumatic, normocephalic, pupils reactive, negative for conjunctival 

pallor or scleral icterus, mucous membranes moist, throat clear, neck supple, 

nontender, trachea midline.


Lungs: Diffuse wheezing, breath sounds equal bilaterally, chest nontender.


Heart: S1S2, regular, negative for clicks, rubs, or JVD.


Abdomen: Soft, nondistended, nontender. Negative for masses or 

hepatosplenomegaly. Negative for costovertebral tenderness.


Pelvis: Stable nontender.


Genitourinary: Deferred.


Rectal: Deferred.


Extremities: Atraumatic, negative for cords or calf pain. Neurovascular 

unremarkable.


Neuro: Awake, alert, oriented. Cranial nerves II through XII unremarkable. 

Cerebellum unremarkable. Motor and sensory unremarkable throughout. Exam 

nonfocal.





Diagnostics:


[]





Therapeutics:


[]





Impression: Asthma tobacco abuse


[]





Plan: DuoNeb albuterol steroids reassess


[]





Definitive disposition and diagnosis as appropriate pending reevaluation and 

review of above.








- Related Data


                                    Allergies











Allergy/AdvReac Type Severity Reaction Status Date / Time


 


No Known Allergies Allergy   Verified 07/23/20 01:12











Home Meds: 


                                    Home Meds





amLODIPine [Norvasc] 10 mg PO DAILY 11/26/19 [History]


hydroCHLOROthiazide [Hydrochlorothiazide] 25 mg PO DAILY 06/15/20 [History]


Albuterol Sulfate [Albuterol Sulfate Hfa] 1 inh INH DAILY PRN 07/20/20 [History]


hydrOXYzine HCL [Hydroxyzine HCl] 50 mg PO TID PRN #10 tablet 07/20/20 [Rx]


Albuterol Sulfate [Albuterol Sulfate Hfa] 8.5 gm IH Q4HR #1 hfa.aer.ad 07/23/20 

[Rx]











Past Medical History





- Past Health History


Medical/Surgical History: Denies Medical/Surgical History


HEENT History: Reports: None


Other HEENT History: Ulcers in both eyes, previous abcess in R side of mouth, 

glasses


Cardiovascular History: Reports: Hypertension


Respiratory History: Reports: Asthma


Gastrointestinal History: Reports: None


Other Gastrointestinal History: coloitis


Genitourinary History: Reports: None


OB/GYN History: Reports: None


Other OB/GYN History: migraines when menstruating


Musculoskeletal History: Reports: None


Neurological History: Reports: None


Psychiatric History: Reports: Addiction, Anxiety, Depression


Other Psychiatric History: ETOH addiction.  has been to rehab twice, quit on own

 as well


Endocrine/Metabolic History: Reports: None


Insulin Pump Model and : N/A


Hematologic History: Reports: None


Immunologic History: Reports: None


Oncologic (Cancer) History: Reports: None


Dermatologic History: Reports: None





- Infectious Disease History


Infectious Disease History: Reports: Chicken Pox





- Past Surgical History


Head Surgeries/Procedures: Reports: None


HEENT Surgical History: Reports: None


Cardiovascular Surgical History: Reports: None


Endocrine Surgical History: Reports: None


Neurological Surgical History: Reports: None


Musculoskeletal Surgical History: Reports: None


Dermatological Surgical History: Reports: None





Social & Family History





- Family History


Family Medical History: Noncontributory


HEENT: Reports: None


Cardiac: Reports: None


Endocrine/Metabolic: Reports: None, Diabetes, Type I





- Tobacco Use


Smoking Status *Q: Current Every Day Smoker


Years of Tobacco use: 12


Packs/Tins Daily: 1





- Caffeine Use


Caffeine Use: Reports: None


Other Caffeine Use: rarely





- Alcohol Use


Days Per Week of Alcohol Use: 7


Number of Drinks Per Day: 4


Total Drinks Per Week: 28





- Recreational Drug Use


Recreational Drug Use: No





ED ROS GENERAL





- Review of Systems


Review Of Systems: See Below





ED EXAM, GENERAL





- Physical Exam


Exam: See Below





Course





- Vital Signs


Text/Narrative:: 





Patient was reexamined at 3 AM she is much improved with her breathing she s

atting 92% on room air with no respiratory distress.  She has albuterol for use 

at home I will give her a refill on her albuterol inhaler she received Decadron 

and 2 breathing treatments in the ED she is counseled to stop smoking follow-up 

with primary care return to the ED if you have worsening respiratory distress.


Last Recorded V/S: 


                                Last Vital Signs











Temp  36.3 C   07/23/20 01:09


 


Pulse  99   07/23/20 02:26


 


Resp  22 H  07/23/20 02:26


 


BP  128/66   07/23/20 02:26


 


Pulse Ox  93 L  07/23/20 02:26














- Orders/Labs/Meds


Orders: 


                               Active Orders 24 hr











 Category Date Time Status


 


 RT Aerosol Therapy [RC] ASDIRECTED Care  07/23/20 01:18 Active


 


 RT Aerosol Therapy [RC] ASDIRECTED Care  07/23/20 01:24 Active











Meds: 


Medications














Discontinued Medications














Generic Name Dose Route Start Last Admin





  Trade Name Moni  PRN Reason Stop Dose Admin


 


Albuterol  5 mg  07/23/20 01:23  07/23/20 01:28





  Proventil Neb Soln  NEB  07/23/20 01:24  5 mg





  ONETIME ONE   Administration


 


Albuterol/Ipratropium  3 ml  07/23/20 01:18  07/23/20 01:19





  Duoneb 3.0-0.5 Mg/3 Ml  NEB  07/23/20 01:19  3 ml





  ONETIME ONE   Administration


 


Dexamethasone  8 mg  07/23/20 01:24  07/23/20 01:28





  Dexamethasone  PO  07/23/20 01:25  8 mg





  ONETIME ONE   Administration














Departure





- Departure


Time of Disposition: 03:01


Disposition: Home, Self-Care 01


Condition: Good


Clinical Impression: 


 Exacerbation of asthma








- Discharge Information


*PRESCRIPTION DRUG MONITORING PROGRAM REVIEWED*: Not Applicable


*COPY OF PRESCRIPTION DRUG MONITORING REPORT IN PATIENT MIGUEL: Not Applicable


Instructions:  Asthma, Adult, Steps to Quit Smoking, Easy-to-Read


Referrals: 


PCP,None [Primary Care Provider] - 


Forms:  ED Department Discharge


Additional Instructions: 


The following information is given to patients seen in the emergency department 

who are being discharged to home. This information is to outline your options 

for follow-up care. We provide all patients seen in our emergency department 

with a follow-up referral.





The need for follow-up, as well as the timing and circumstances, are variable 

depending upon the specifics of your emergency department visit.





If you don't have a primary care physician on staff, we will provide you with a 

referral. We always advise you to contact your personal physician following an 

emergency department visit to inform them of the circumstance of the visit and 

for follow-up with them and/or the need for any referrals to a consulting 

specialist.





The emergency department will also refer you to a specialist when appropriate. 

This referral assures that you have the opportunity for follow-up care with a 

specialist. All of these measure are taken in an effort to provide you with 

optimal care, which includes your follow-up.





Under all circumstances we always encourage you to contact your private 

physician who remains a resource for coordinating your care. When calling for 

follow-up care, please make the office aware that this follow-up is from your 

recent emergency room visit. If for any reason you are refused follow-up, please

contact the [facility] Department

at [phone] and asked to speak to the emergency department charge nurse.











Sepsis Event Note (ED)





- Evaluation


Sepsis Screening Result: No Definite Risk





- Focused Exam


Vital Signs: 


                                   Vital Signs











  Temp Pulse Resp BP Pulse Ox


 


 07/23/20 02:26   99  22 H  128/66  93 L


 


 07/23/20 01:31   89  24 H  131/78  98


 


 07/23/20 01:09  36.3 C  99  16  131/78  94 L














- My Orders


Last 24 Hours: 


My Active Orders





07/23/20 01:18


RT Aerosol Therapy [RC] ASDIRECTED 





07/23/20 01:24


RT Aerosol Therapy [RC] ASDIRECTED 














- Assessment/Plan


Last 24 Hours: 


My Active Orders





07/23/20 01:18


RT Aerosol Therapy [RC] ASDIRECTED 





07/23/20 01:24


RT Aerosol Therapy [RC] ASDIRECTED

## 2020-08-03 ENCOUNTER — HOSPITAL ENCOUNTER (EMERGENCY)
Dept: HOSPITAL 56 - MW.ED | Age: 35
Discharge: HOME | End: 2020-08-03
Payer: SELF-PAY

## 2020-08-03 VITALS — SYSTOLIC BLOOD PRESSURE: 129 MMHG | HEART RATE: 78 BPM | DIASTOLIC BLOOD PRESSURE: 89 MMHG

## 2020-08-03 DIAGNOSIS — Z79.899: ICD-10-CM

## 2020-08-03 DIAGNOSIS — J45.901: Primary | ICD-10-CM

## 2020-08-03 DIAGNOSIS — F17.210: ICD-10-CM

## 2020-08-03 DIAGNOSIS — I10: ICD-10-CM

## 2020-08-03 PROCEDURE — 99284 EMERGENCY DEPT VISIT MOD MDM: CPT

## 2020-08-03 NOTE — EDM.PDOC
ED HPI GENERAL MEDICAL PROBLEM





- General


Chief Complaint: General


Stated Complaint: SOB


Time Seen by Provider: 08/03/20 14:34





- History of Present Illness


INITIAL COMMENTS - FREE TEXT/NARRATIVE: 





History of present illness:


Patient presents with concerns of shortness of breath is been waxing and waning 

off and on for the past month she saw her regular doctor who said there is 

nothing wrong with her and gave her a shot of steroids.  She uses albuterol 

inhaler for her asthma symptoms she has had asthma for some time she also 

smokes.  Has any fever chills no leg pain or leg swelling no chest pain she has 

not been coughing more than her normal baseline smoker's cough.  She has been 

using her albuterol but this has not been helping with her shortness of breath.





Review of systems: 


As per history of present illness and below otherwise all systems reviewed and 

negative.





Past medical history: 


As per history of present illness and as reviewed below otherwise 

noncontributory.





Surgical history: 


As per history of present illness and as reviewed below otherwise 

noncontributory.





Social history: 


No reported history of drug or alcohol abuse.





Family history: 


As per history of present illness and as reviewed below otherwise 

noncontributory.





Physical exam:


HEENT: Atraumatic, normocephalic, pupils reactive, negative for conjunctival 

pallor or scleral icterus, mucous membranes moist, throat clear, neck supple, 

nontender, trachea midline.


Lungs: Scattered wheezing and rhonchi bilaterally, breath sounds equal 

bilaterally, chest nontender.


Heart: S1S2, regular, negative for clicks, rubs, or JVD.


Abdomen: Soft, nondistended, nontender. Negative for masses or 

hepatosplenomegaly. Negative for costovertebral tenderness.


Pelvis: Stable nontender.


Genitourinary: Deferred.


Rectal: Deferred.


Extremities: Atraumatic, negative for cords or calf pain. Neurovascular 

unremarkable.


Neuro: Awake, alert, oriented. Cranial nerves II through XII unremarkable. 

Cerebellum unremarkable. Motor and sensory unremarkable throughout. Exam 

nonfocal.





Diagnostics:


[]





Therapeutics:


[]





Impression: Asthma exacerbation, tobacco abuse


[]





Plan: DuoNeb steroids DC home prednisone follow-up with primary care


[]





Definitive disposition and diagnosis as appropriate pending reevaluation and 

review of above.








- Related Data


                                    Allergies











Allergy/AdvReac Type Severity Reaction Status Date / Time


 


No Known Allergies Allergy   Verified 08/03/20 14:28











Home Meds: 


                                    Home Meds





amLODIPine [Norvasc] 10 mg PO DAILY 11/26/19 [History]


hydroCHLOROthiazide [Hydrochlorothiazide] 25 mg PO DAILY 06/15/20 [History]


Albuterol Sulfate [Albuterol Sulfate Hfa] 8.5 gm IH Q4HR #1 [id number] 07/23/20 

[Rx]


predniSONE 60 mg PO WITHBREAKFAST 5 Days #15 tab 08/03/20 [Rx]











Past Medical History





- Past Health History


Medical/Surgical History: Denies Medical/Surgical History


HEENT History: Reports: None


Other HEENT History: Ulcers in both eyes, previous abcess in R side of mouth, 

glasses


Cardiovascular History: Reports: Hypertension


Respiratory History: Reports: Asthma


Gastrointestinal History: Reports: None


Other Gastrointestinal History: coloitis


Genitourinary History: Reports: None


OB/GYN History: Reports: None


Other OB/GYN History: migraines when menstruating


Musculoskeletal History: Reports: None


Neurological History: Reports: None


Psychiatric History: Reports: Addiction, Anxiety, Depression, Panic Attack, Renee

cide Attempt, Suicidal Ideation


Other Psychiatric History: ETOH addiction.  has been to rehab twice, quit on own

 as well


Endocrine/Metabolic History: Reports: None


Insulin Pump Model and : N/A


Hematologic History: Reports: None


Immunologic History: Reports: None


Oncologic (Cancer) History: Reports: None


Dermatologic History: Reports: None





- Infectious Disease History


Infectious Disease History: Reports: Chicken Pox





- Past Surgical History


Head Surgeries/Procedures: Reports: None


HEENT Surgical History: Reports: None


Cardiovascular Surgical History: Reports: None


Endocrine Surgical History: Reports: None


Neurological Surgical History: Reports: None


Musculoskeletal Surgical History: Reports: None


Dermatological Surgical History: Reports: None





Social & Family History





- Family History


Family Medical History: Noncontributory


HEENT: Reports: None


Cardiac: Reports: None


Endocrine/Metabolic: Reports: None, Diabetes, Type I





- Tobacco Use


Smoking Status *Q: Current Every Day Smoker


Years of Tobacco use: 12


Packs/Tins Daily: 1





- Caffeine Use


Caffeine Use: Reports: None


Other Caffeine Use: rarely





- Recreational Drug Use


Recreational Drug Use: No





ED ROS GENERAL





- Review of Systems


Review Of Systems: See Below





ED EXAM, GENERAL





- Physical Exam


Exam: See Below





Course





- Vital Signs


Text/Narrative:: 





Patient was counseled to stop smoking.








Patient was reexamined at 3:15 PM she is feeling better breath sounds have 

improved 97% on room air she will be discharged home with prednisone.  She 

already has albuterol inhaler with her follow-up with primary care


Last Recorded V/S: 


                                Last Vital Signs











Temp  35.9 C L  08/03/20 14:25


 


Pulse  93   08/03/20 14:25


 


Resp  16   08/03/20 14:25


 


BP  148/93 H  08/03/20 14:25


 


Pulse Ox  95   08/03/20 14:25














- Orders/Labs/Meds


Orders: 


                               Active Orders 24 hr











 Category Date Time Status


 


 RT Aerosol Therapy [RC] ASDIRECTED Care  08/03/20 14:42 Active











Meds: 


Medications














Discontinued Medications














Generic Name Dose Route Start Last Admin





  Trade Name Freq  PRN Reason Stop Dose Admin


 


Albuterol/Ipratropium  3 ml  08/03/20 14:41  08/03/20 14:47





  Duoneb 3.0-0.5 Mg/3 Ml  NEB  08/03/20 14:42  3 ml





  ONETIME ONE   Administration


 


Dexamethasone  8 mg  08/03/20 14:42  08/03/20 14:47





  Dexamethasone  PO  08/03/20 14:43  8 mg





  ONETIME ONE   Administration














Departure





- Departure


Time of Disposition: 15:15


Disposition: Home, Self-Care 01


Condition: Good


Clinical Impression: 


 Asthma exacerbation, Tobacco abuse








- Discharge Information


*PRESCRIPTION DRUG MONITORING PROGRAM REVIEWED*: Not Applicable


*COPY OF PRESCRIPTION DRUG MONITORING REPORT IN PATIENT MIGUEL: Not Applicable


Prescriptions: 


predniSONE 60 mg PO WITHBREAKFAST 5 Days #15 tab


Instructions:  Asthma, Adult, Steps to Quit Smoking


Referrals: 


PCP,None [Primary Care Provider] - 


Forms:  ED Department Discharge


Additional Instructions: 


The following information is given to patients seen in the emergency department 

who are being discharged to home. This information is to outline your options 

for follow-up care. We provide all patients seen in our emergency department 

with a follow-up referral.





The need for follow-up, as well as the timing and circumstances, are variable 

depending upon the specifics of your emergency department visit.





If you don't have a primary care physician on staff, we will provide you with a 

referral. We always advise you to contact your personal physician following an 

emergency department visit to inform them of the circumstance of the visit and 

for follow-up with them and/or the need for any referrals to a consulting 

specialist.





The emergency department will also refer you to a specialist when appropriate. 

This referral assures that you have the opportunity for follow-up care with a 

specialist. All of these measure are taken in an effort to provide you with o

ptimal care, which includes your follow-up.





Under all circumstances we always encourage you to contact your private 

physician who remains a resource for coordinating your care. When calling for 

follow-up care, please make the office aware that this follow-up is from your 

recent emergency room visit. If for any reason you are refused follow-up, please

contact the [facility] Emergency Department

at [phone] and asked to speak to the emergency department charge nurse.











Sepsis Event Note (ED)





- Evaluation


Sepsis Screening Result: No Definite Risk





- Focused Exam


Vital Signs: 


                                   Vital Signs











  Temp Pulse Resp BP Pulse Ox


 


 08/03/20 14:25  35.9 C L  93  16  148/93 H  95














- My Orders


Last 24 Hours: 


My Active Orders





08/03/20 14:42


RT Aerosol Therapy [RC] ASDIRECTED 














- Assessment/Plan


Last 24 Hours: 


My Active Orders





08/03/20 14:42


RT Aerosol Therapy [RC] ASDIRECTED

## 2020-12-26 ENCOUNTER — HOSPITAL ENCOUNTER (EMERGENCY)
Dept: HOSPITAL 56 - MW.ED | Age: 35
Discharge: HOME | End: 2020-12-26
Payer: SELF-PAY

## 2020-12-26 VITALS — SYSTOLIC BLOOD PRESSURE: 155 MMHG | DIASTOLIC BLOOD PRESSURE: 109 MMHG | HEART RATE: 90 BPM

## 2020-12-26 DIAGNOSIS — S60.051A: Primary | ICD-10-CM

## 2020-12-26 DIAGNOSIS — I10: ICD-10-CM
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
## 2020-12-26 DIAGNOSIS — J45.909: ICD-10-CM

## 2020-12-26 LAB
BUN SERPL-MCNC: 8 MG/DL (ref 7–18)
CHLORIDE SERPL-SCNC: 101 MMOL/L (ref 98–107)
CO2 SERPL-SCNC: 27.8 MMOL/L (ref 21–32)
GLUCOSE SERPL-MCNC: 96 MG/DL (ref 74–106)
POTASSIUM SERPL-SCNC: 3.7 MMOL/L (ref 3.5–5.1)
SODIUM SERPL-SCNC: 137 MMOL/L (ref 136–145)

## 2020-12-26 PROCEDURE — 73140 X-RAY EXAM OF FINGER(S): CPT

## 2020-12-26 PROCEDURE — 85025 COMPLETE CBC W/AUTO DIFF WBC: CPT

## 2020-12-26 PROCEDURE — 36415 COLL VENOUS BLD VENIPUNCTURE: CPT

## 2020-12-26 PROCEDURE — 84550 ASSAY OF BLOOD/URIC ACID: CPT

## 2020-12-26 PROCEDURE — 99283 EMERGENCY DEPT VISIT LOW MDM: CPT

## 2020-12-26 PROCEDURE — 80053 COMPREHEN METABOLIC PANEL: CPT

## 2020-12-26 NOTE — EDM.PDOC
ED HPI GENERAL MEDICAL PROBLEM





- General


Chief Complaint: Upper Extremity Injury/Pain


Stated Complaint: PAIN IN LT HAND


Time Seen by Provider: 12/26/20 10:38


Source of Information: Reports: Patient


History Limitations: Reports: No Limitations





- History of Present Illness


INITIAL COMMENTS - FREE TEXT/NARRATIVE: 


HISTORY AND PHYSICAL:





History of present illness:


Patient is a 35-year-old female who presents to the emergency room with 

complaints of right fifth digit pain and swelling.  She reports over the past 2 

weeks she has had pain to her finger and noticed some fluctuation in swelling.  

She denies any significant injury or trauma although states with her job she is 

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  Patient denies any fever, 

chills, headache, change in vision, syncope or near syncope.  Denies any 

numbness, tingling, saddle paresthesia or weakness of the extremities.  Denies 

any chest pain, back pain, shortness of breath or cough. Denies any GI or  

symptoms.  Tetanus is up-to-date.





Review of systems: 


As per history of present illness and below otherwise all systems reviewed and 

negative.





Past medical history: 


As per history of present illness and as reviewed below otherwise 

noncontributory.





Surgical history: 


As per history of present illness and as reviewed below otherwise 

noncontributory.





Social history: 


See social history for further information





Family history: 


As per history of present illness and as reviewed below otherwise 

noncontributory.





Physical exam:


General: Well developed and well nourished 35 year old female. Alert and 

orientated x 3. Nontoxic in appearance and in no acute distress. Vital signs are

stable and have been reviewed by me. Nursing notes were reviewed. 


HEENT: Atraumatic, normocephalic, pupils equal and reactive bilaterally, 

negative for conjunctival pallor or scleral icterus, mucous membranes moist, TMs

normal bilaterally, throat clear, neck supple, nontender, trachea midline. No 

drooling or trismus noted. No meningeal signs. No hot potato voice noted. 


Lungs: Clear to auscultation, breath sounds equal bilaterally, chest nontender. 

Normal work of breathing, no accessory muscles used.


Heart: S1S2, regular rate and rhythm without overt murmur


Abdomen: Soft, nondistended, nontender. 


Skin: Mild soft tissue swelling of the right 5th digit, no fluctuance or 

induration. Remaining skin is intact, warm, dry. No lesions or rashes noted.


Hematologic: No petechiae or purpra. Mucosa appropriate color and normal nail 

bed color and refill.


Extremities: Atraumatic, moves all extremities per self without difficulty or 

deficits,  mild soft tissue swelling of the right 5th digit (SEE SKIN). 

Neurovascular unremarkable.


Neuro: Awake, alert, oriented. Cranial nerves II through XII unremarkable. Cereb

ellum unremarkable. Motor and sensory unremarkable throughout. Exam nonfocal.


Psychiatric: Mood and affect are appropriate.  Normal thought process. Answering

questions appropriately.





Notes:


The finger appears non-systemic, but I will do basic labs to rule out infection 

and gout. X-ray shows soft tissue swelling but no acute fractures. I have talked

with the patient about today's findings, in addition to providing specific 

details for plan of care.  Reassessment at the time of disposition demonstrates 

that the patient is in no acute distress.  The patient is stable for discharge, 

counseling was provided and we discussed in great detail signs and symptoms that

would prompt them to return to the Emergency Department. She has an appointment 

with her PCP on Tuesday, 12/29/20. Encouraged her to mention her finger 

pain/swelling for further care. Medication, follow up and supportive care 

measures were reviewed and discussed. Voices understanding and is agreeable to 

plan of care. Denies any further questions or concerns at this time.





Diagnostics:


CBC, CMP, Uric Acid, Finger X-ray





Therapeutics:


Tramadol





Prescription:


Tramadol





Impression: 


Contusion, finger





Plan:


1. Today your lab work and x-ray are within normal limits. Rest, ice and elevate

the finger/hand as able. 


2. Tylenol and Ibuprofen as needed. Tramadol for moderate to severe pain. Take 

as directed, may cause drowsiness, so do not take while driving or needing to be

functioning outside the house. 


3. We encourage you to follow up with your primary care provider on Tuesday 

(12/29/20) as you already have arranged for re-evaluation and further 

care/management. If your symptoms should worsen, new symptoms develop or any of 

the signs and symptoms we discussed should arise please return to the emergency 

room or call 911 (if needed).





Definitive disposition and diagnosis as appropriate pending reevaluation and 

review of above.








- Related Data


                                    Allergies











Allergy/AdvReac Type Severity Reaction Status Date / Time


 


No Known Allergies Allergy   Verified 12/26/20 11:23











Home Meds: 


                                    Home Meds





traMADol [Ultram] 50 mg PO Q4H PRN #15 tab 12/26/20 [Rx]











Past Medical History





- Past Health History


Medical/Surgical History: Denies Medical/Surgical History


HEENT History: Reports: None


Other HEENT History: Ulcers in both eyes, previous abcess in R side of mouth, 

glasses


Cardiovascular History: Reports: Hypertension


Respiratory History: Reports: Asthma


Gastrointestinal History: Reports: None


Other Gastrointestinal History: coloitis


Genitourinary History: Reports: None


OB/GYN History: Reports: None


Other OB/GYN History: migraines when menstruating


Musculoskeletal History: Reports: None


Neurological History: Reports: None


Psychiatric History: Reports: Addiction, Anxiety, Depression, Panic Attack, 

Suicide Attempt, Suicidal Ideation


Other Psychiatric History: ETOH addiction.  has been to rehab twice, quit on own

 as well


Endocrine/Metabolic History: Reports: None


Insulin Pump Model and : N/A


Hematologic History: Reports: None


Immunologic History: Reports: None


Oncologic (Cancer) History: Reports: None


Dermatologic History: Reports: None





- Infectious Disease History


Infectious Disease History: Reports: Chicken Pox





- Past Surgical History


Head Surgeries/Procedures: Reports: None


HEENT Surgical History: Reports: None


Cardiovascular Surgical History: Reports: None


Endocrine Surgical History: Reports: None


Neurological Surgical History: Reports: None


Musculoskeletal Surgical History: Reports: None


Dermatological Surgical History: Reports: None





Social & Family History





- Family History


Family Medical History: No Pertinent Family History


HEENT: Reports: None


Cardiac: Reports: None


Endocrine/Metabolic: Reports: None, Diabetes, Type I





- Caffeine Use


Caffeine Use: Reports: None


Other Caffeine Use: rarely





Review of Systems





- Review of Systems


Review Of Systems: Comprehensive ROS is negative, except as noted in HPI.





ED EXAM, GENERAL





- Physical Exam


Exam: See Below (See dictation)





Course





- Orders/Labs/Meds


Labs: 


                                Laboratory Tests











  12/26/20 12/26/20 Range/Units





  11:00 11:00 


 


WBC  10.07   (4.0-11.0)  K/uL


 


RBC  4.52   (4.30-5.90)  M/uL


 


Hgb  15.9   (12.0-16.0)  g/dL


 


Hct  47.6 H   (36.0-46.0)  %


 


MCV  105.3 H   (80.0-98.0)  fL


 


MCH  35.2 H   (27.0-32.0)  pg


 


MCHC  33.4   (31.0-37.0)  g/dL


 


RDW Std Deviation  53.3   (28.0-62.0)  fl


 


RDW Coeff of Flo  14   (11.0-15.0)  %


 


Plt Count  207   (150-400)  K/uL


 


MPV  10.00   (7.40-12.00)  fL


 


Neut % (Auto)  59.3   (48.0-80.0)  %


 


Lymph % (Auto)  26.9   (16.0-40.0)  %


 


Mono % (Auto)  11.5   (0.0-15.0)  %


 


Eos % (Auto)  2.1   (0.0-7.0)  %


 


Baso % (Auto)  0.2   (0.0-1.5)  %


 


Neut # (Auto)  6.0 H   (1.4-5.7)  K/uL


 


Lymph # (Auto)  2.7 H   (0.6-2.4)  K/uL


 


Mono # (Auto)  1.2 H   (0.0-0.8)  K/uL


 


Eos # (Auto)  0.2   (0.0-0.7)  K/uL


 


Baso # (Auto)  0.0   (0.0-0.1)  K/uL


 


Nucleated RBC %  0.0   /100WBC


 


Nucleated RBCs #  0   K/uL


 


Sodium   137  (136-145)  mmol/L


 


Potassium   3.7  (3.5-5.1)  mmol/L


 


Chloride   101  ()  mmol/L


 


Carbon Dioxide   27.8  (21.0-32.0)  mmol/L


 


BUN   8  (7.0-18.0)  mg/dL


 


Creatinine   0.7  (0.6-1.0)  mg/dL


 


Est Cr Clr Drug Dosing   TNP  


 


Estimated GFR (MDRD)   > 60.0  ml/min


 


Glucose   96  ()  mg/dL


 


Uric Acid   6.1  (2.6-7.2)  mg/dL


 


Calcium   8.9  (8.5-10.1)  mg/dL


 


Total Bilirubin   1.0  (0.2-1.0)  mg/dL


 


AST   36  (15-37)  IU/L


 


ALT   35  (14-63)  IU/L


 


Alkaline Phosphatase   79  ()  U/L


 


Total Protein   8.0  (6.4-8.2)  g/dL


 


Albumin   3.6  (3.4-5.0)  g/dL


 


Globulin   4.4 H  (2.6-4.0)  g/dL


 


Albumin/Globulin Ratio   0.8 L  (0.9-1.6)  











Meds: 


Medications














Discontinued Medications














Generic Name Dose Route Start Last Admin





  Trade Name Freq  PRN Reason Stop Dose Admin


 


Tramadol HCl  50 mg  12/26/20 10:56  12/26/20 11:25





  Ultram  PO  12/26/20 10:57  50 mg





  ONETIME ONE   Administration














Departure





- Departure


Time of Disposition: 11:58


Disposition: Home, Self-Care 01


Clinical Impression: 


Contusion


Qualifiers:


 Encounter type: initial encounter Contusion area: finger Finger: little finger 

Damage to nail status: without damage Laterality: right Qualified Code(s): 

S60.051A - Contusion of right little finger without damage to nail, initial 

encounter








- Discharge Information


Prescriptions: 


traMADol [Ultram] 50 mg PO Q4H PRN #15 tab


 PRN Reason: Pain


Instructions:  Finger Sprain, Adult, Easy-to-Read


Referrals: 


PCP,None [Primary Care Provider] - 


Forms:  ED Department Discharge


Additional Instructions: 


The following information is given to patients seen in the emergency department 

who are being discharged to home. This information is to outline your options 

for follow-up care. We provide all patients seen in our emergency department 

with a follow-up referral.





The need for follow-up, as well as the timing and circumstances, are variable 

depending upon the specifics of your emergency department visit.





If you don't have a primary care physician on staff, we will provide you with a 

referral. We always advise you to contact your personal physician following an 

emergency department visit to inform them of the circumstance of the visit and 

for follow-up with them and/or the need for any referrals to a consulting 

specialist.





The emergency department will also refer you to a specialist when appropriate. 

This referral assures that you have the opportunity for follow-up care with a 

specialist. All of these measure are taken in an effort to provide you with 

optimal care, which includes your follow-up.





Under all circumstances we always encourage you to contact your private 

physician who remains a resource for coordinating your care. When calling for 

follow-up care, please make the office aware that this follow-up is from your r

ecent emergency room visit. If for any reason you are refused follow-up, please 

contact the [facility] Emergency Department

at [phone] and asked to speak to the emergency department charge nurse.





[facility]


Primary Care


[address] [place] [address]


Phone: [phone]


Fax: [phone]





[address] [address]


Phone: [phone]


Fax: [phone]





Thank you for choosing the [facility] emergency department in 

[place] for your medical needs today.  It was a pleasure caring for you. Today

you were seen in the emergency department for finger pain and swelling.





1. Today your lab work and x-ray are within normal limits. Rest, ice and elevate

the finger/hand as able. 


2. Tylenol and Ibuprofen as needed. Tramadol for moderate to severe pain. Take 

as directed, may cause drowsiness, so do not take while driving or needing to be

functioning outside the house. 


3. We encourage you to follow up with your primary care provider on Tuesday 

(12/29/20) as you already have arranged for re-evaluation and further care/man

agement. If your symptoms should worsen, new symptoms develop or any of the 

signs and symptoms we discussed should arise please return to the emergency room

or call 911 (if needed).

## 2020-12-26 NOTE — CR
HISTORY:



Right 5th digit swelling and pain.



COMPARISON:



None.



TECHNIQUE:



Right 5th finger, 3 views.



FINDINGS:



There is soft tissue swelling, primarily overlying the right 5th proximal 

phalanx. There is no acute fracture. There is no malalignment. No 

radiopaque foreign body. No articular erosion or periarticular osteopenia. 

The proximal and distal rows of the carpus are intact.



IMPRESSION:



There is no acute bone abnormality.



Dictated by Steve Sam MD @ Dec 26 2020 11:54AM



Signed by Dr. Steve Sam @ Dec 26 2020 11:55AM

## 2021-01-14 ENCOUNTER — HOSPITAL ENCOUNTER (EMERGENCY)
Dept: HOSPITAL 56 - MW.ED | Age: 36
Discharge: HOME | End: 2021-01-14
Payer: SELF-PAY

## 2021-01-14 VITALS — DIASTOLIC BLOOD PRESSURE: 89 MMHG | SYSTOLIC BLOOD PRESSURE: 147 MMHG | HEART RATE: 90 BPM

## 2021-01-14 DIAGNOSIS — Z72.0: ICD-10-CM

## 2021-01-14 DIAGNOSIS — I10: ICD-10-CM

## 2021-01-14 DIAGNOSIS — J45.909: ICD-10-CM

## 2021-01-14 DIAGNOSIS — M13.0: Primary | ICD-10-CM

## 2021-01-14 DIAGNOSIS — Z20.822: ICD-10-CM

## 2021-01-14 DIAGNOSIS — Z79.899: ICD-10-CM

## 2021-01-14 LAB
BUN SERPL-MCNC: 6 MG/DL (ref 7–18)
CHLORIDE SERPL-SCNC: 92 MMOL/L (ref 98–107)
CO2 SERPL-SCNC: 28.2 MMOL/L (ref 21–32)
GLUCOSE SERPL-MCNC: 95 MG/DL (ref 74–106)
POTASSIUM SERPL-SCNC: 3.7 MMOL/L (ref 3.5–5.1)
SODIUM SERPL-SCNC: 129 MMOL/L (ref 136–145)

## 2021-01-14 PROCEDURE — 80053 COMPREHEN METABOLIC PANEL: CPT

## 2021-01-14 PROCEDURE — 86225 DNA ANTIBODY NATIVE: CPT

## 2021-01-14 PROCEDURE — 86038 ANTINUCLEAR ANTIBODIES: CPT

## 2021-01-14 PROCEDURE — 85652 RBC SED RATE AUTOMATED: CPT

## 2021-01-14 PROCEDURE — 86162 COMPLEMENT TOTAL (CH50): CPT

## 2021-01-14 PROCEDURE — 85025 COMPLETE CBC W/AUTO DIFF WBC: CPT

## 2021-01-14 PROCEDURE — U0002 COVID-19 LAB TEST NON-CDC: HCPCS

## 2021-01-14 PROCEDURE — 81001 URINALYSIS AUTO W/SCOPE: CPT

## 2021-01-14 PROCEDURE — 96372 THER/PROPH/DIAG INJ SC/IM: CPT

## 2021-01-14 PROCEDURE — 84703 CHORIONIC GONADOTROPIN ASSAY: CPT

## 2021-01-14 PROCEDURE — 99283 EMERGENCY DEPT VISIT LOW MDM: CPT

## 2021-01-14 PROCEDURE — 87635 SARS-COV-2 COVID-19 AMP PRB: CPT

## 2021-01-14 NOTE — EDM.PDOC
ED HPI GENERAL MEDICAL PROBLEM





- General


Chief Complaint: Upper Extremity Injury/Pain


Stated Complaint: SWELLING IN FEET


Time Seen by Provider: 01/14/21 19:28





- History of Present Illness


INITIAL COMMENTS - FREE TEXT/NARRATIVE: 


History of present illness:





Presents with swelling of her hands and pain in her hands as well as swelling in

her feet and pain in her feet.  She presented 26 December with swelling of the 

right pinky finger at the PIP.  She had a bit of a work-up and was told she 

probably did not have rheumatoid arthritis.  She now has spreading to all of her

upper extremity digits and her feet.  She has no other systemic signs of 

illness.  The patient had a rheumatoid factor that was negative and a sed rate 

that was normal.





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  She has no narcotic abuse problems or 

tendency according to her.  She is unable to take oxycodone but can take Tylenol

6 3 or hydrocodone if we needed to do that.





The patient has no urinary tract symptoms.  She is not on a calcium channel 

blocker.  No recent change in medications or new exposures she knows of.  The 

family note has no history of autoimmune disorder panic disorder








[]





Review of systems: 


As per history of present illness and below otherwise all systems reviewed and 

negative.





Past medical history: 


As per history of present illness and as reviewed below otherwise 

noncontributory.





Surgical history: 


As per history of present illness and as reviewed below otherwise 

noncontributory.





Social history: 


No reported history of drug or alcohol abuse.





Family history: 


As per history of present illness and as reviewed below otherwise 

noncontributory.





Physical exam:


Constitutional - well developed, well-nourished and in no acute distress


HEENT - normocephalic, no evidence of trauma - external nose and mouth normal - 

no mass in neck and no JVD - mucosae moist





EYES - full EOM, PERRL, no icterus - no evidence of inflammation, injection, or 

drainage





Respiratory - no respiratory distress, equal bilateral expansion





Musculoskeletal she has swelling of the IPJ's of both upper extremities and 

ecchymosis of the PIP of the right fifth digit.  She has swelling of the feet.  

Both the hands and feet are tender.  No gross deformity of long bones or joints 

- no tenderness, swelling or edema





Neurologic - Alert and oriented times four - CN II-XII grossly intact - motor 

sensory and coordination symmetrically normal





Psychiatric - appropriate mood and affect with normal thought content





Hematologic - No petechiae or purpura - mucosa appropriate color and sclera not 

pale - normal nail bed color and refill





Integument - no rash or evidence of trauma - normal turgor





Diagnostics:


[]





Therapeutics:


[]





Impression: 


[]





Plan:


[]





Definitive disposition and diagnosis as appropriate pending reevaluation and 

review of above.








  ** hands/feet


Pain Score (Numeric/FACES): 10





- Related Data


                                    Allergies











Allergy/AdvReac Type Severity Reaction Status Date / Time


 


No Known Allergies Allergy   Verified 01/14/21 19:41











Home Meds: 


                                    Home Meds





hydroCHLOROthiazide [Hydrochlorothiazide] 12.5 mg PO DAILY #30 cap 12/26/20 [Rx]


Acetaminophen/HYDROcodone [Norco 325-10 MG] 1 tab PO Q6H PRN #14 tablet 01/14/21

[Rx]


methylPREDNISolone [Medrol Dose Pack] 4 mg PO DAILY #21 tab 01/14/21 [Rx]











Past Medical History





- Past Health History


Medical/Surgical History: Denies Medical/Surgical History


HEENT History: Reports: None


Other HEENT History: Ulcers in both eyes, previous abcess in R side of mouth, 

glasses


Cardiovascular History: Reports: Hypertension


Respiratory History: Reports: Asthma


Gastrointestinal History: Reports: None


Other Gastrointestinal History: coloitis


Genitourinary History: Reports: None


OB/GYN History: Reports: None


Other OB/GYN History: migraines when menstruating


Musculoskeletal History: Reports: None


Neurological History: Reports: None


Psychiatric History: Reports: Addiction, Anxiety, Depression, Panic Attack, 

Suicide Attempt, Suicidal Ideation


Other Psychiatric History: ETOH addiction.  has been to rehab twice, quit on own

 as well


Endocrine/Metabolic History: Reports: None


Insulin Pump Model and : N/A


Hematologic History: Reports: None


Immunologic History: Reports: None


Oncologic (Cancer) History: Reports: None


Dermatologic History: Reports: None





- Infectious Disease History


Infectious Disease History: Reports: Chicken Pox





- Past Surgical History


Head Surgeries/Procedures: Reports: None


HEENT Surgical History: Reports: None


Cardiovascular Surgical History: Reports: None


Respiratory Surgical History: Reports: None


GI Surgical History: Reports: None


Endocrine Surgical History: Reports: None


Neurological Surgical History: Reports: None


Musculoskeletal Surgical History: Reports: None


Dermatological Surgical History: Reports: None





Social & Family History





- Family History


Family Medical History: No Pertinent Family History


HEENT: Reports: None


Cardiac: Reports: None


Endocrine/Metabolic: Reports: None, Diabetes, Type I





- Tobacco Use


Tobacco Use Status *Q: Current Every Day Tobacco User


Years of Tobacco use: 10


Packs/Tins Daily: 2





- Caffeine Use


Caffeine Use: Reports: Coffee, Soda


Other Caffeine Use: rarely





- Recreational Drug Use


Recreational Drug Use: No





Review of Systems





- Review of Systems


Review Of Systems: Comprehensive ROS is negative, except as noted in HPI.





ED EXAM, GENERAL





- Physical Exam


Exam: See Below


Free Text/Narrative:: 





My physical exam is in the HPI





Course





- Vital Signs


Text/Narrative:: 





2227 hrs. feels better


Last Recorded V/S: 


                                Last Vital Signs











Temp  36.2 C   01/14/21 19:42


 


Pulse  113 H  01/14/21 19:42


 


Resp  20   01/14/21 19:42


 


BP  166/117 H  01/14/21 19:42


 


Pulse Ox  96   01/14/21 19:42














- Orders/Labs/Meds


Orders: 


                               Active Orders 24 hr











 Category Date Time Status


 


 JANIE W/RFX TO ALL IF POSITIVE [REF] Stat Lab  01/14/21 20:48 Received


 


 ANTI-DNA (DS) AB QN [REF] Stat Lab  01/14/21 20:48 Received


 


 COMPLEMENT, TOTAL (CH50) [REF] Stat Lab  01/14/21 20:48 Received











Labs: 


                                Laboratory Tests











  01/14/21 01/14/21 01/14/21 Range/Units





  20:33 20:48 20:48 


 


WBC   13.35 H   (4.0-11.0)  K/uL


 


RBC   4.22 L   (4.30-5.90)  M/uL


 


Hgb   14.4   (12.0-16.0)  g/dL


 


Hct   43.6   (36.0-46.0)  %


 


MCV   103.3 H   (80.0-98.0)  fL


 


MCH   34.1 H   (27.0-32.0)  pg


 


MCHC   33.0   (31.0-37.0)  g/dL


 


RDW Std Deviation   50.7   (28.0-62.0)  fl


 


RDW Coeff of Flo   14   (11.0-15.0)  %


 


Plt Count   545 H   (150-400)  K/uL


 


MPV   9.40   (7.40-12.00)  fL


 


Neut % (Auto)   81.3 H   (48.0-80.0)  %


 


Lymph % (Auto)   10.6 L   (16.0-40.0)  %


 


Mono % (Auto)   7.0   (0.0-15.0)  %


 


Eos % (Auto)   1.0   (0.0-7.0)  %


 


Baso % (Auto)   0.1   (0.0-1.5)  %


 


Neut # (Auto)   10.9 H   (1.4-5.7)  K/uL


 


Lymph # (Auto)   1.4   (0.6-2.4)  K/uL


 


Mono # (Auto)   0.9 H   (0.0-0.8)  K/uL


 


Eos # (Auto)   0.1   (0.0-0.7)  K/uL


 


Baso # (Auto)   0.0   (0.0-0.1)  K/uL


 


Nucleated RBC %   0.0   /100WBC


 


Nucleated RBCs #   0   K/uL


 


ESR     (0-19)  mm/hr


 


Sodium    129 L  (136-145)  mmol/L


 


Potassium    3.7  (3.5-5.1)  mmol/L


 


Chloride    92 L  ()  mmol/L


 


Carbon Dioxide    28.2  (21.0-32.0)  mmol/L


 


BUN    6 L  (7.0-18.0)  mg/dL


 


Creatinine    0.6  (0.6-1.0)  mg/dL


 


Est Cr Clr Drug Dosing    115.16  mL/min


 


Estimated GFR (MDRD)    > 60.0  ml/min


 


Glucose    95  ()  mg/dL


 


Calcium    9.2  (8.5-10.1)  mg/dL


 


Total Bilirubin    0.6  (0.2-1.0)  mg/dL


 


AST    15  (15-37)  IU/L


 


ALT    11 L  (14-63)  IU/L


 


Alkaline Phosphatase    88  ()  U/L


 


Total Protein    8.6 H  (6.4-8.2)  g/dL


 


Albumin    2.8 L  (3.4-5.0)  g/dL


 


Globulin    5.8 H  (2.6-4.0)  g/dL


 


Albumin/Globulin Ratio    0.5 L  (0.9-1.6)  


 


HCG, Qual     (NEG)  


 


Urine Color  YELLOW    


 


Urine Appearance  CLEAR    


 


Urine pH  6.0    (5.0-8.0)  


 


Ur Specific Gravity  1.010    (1.001-1.035)  


 


Urine Protein  NEGATIVE    (NEGATIVE)  mg/dL


 


Urine Glucose (UA)  NEGATIVE    (NEGATIVE)  mg/dL


 


Urine Ketones  TRACE H    (NEGATIVE)  mg/dL


 


Urine Occult Blood  LARGE H    (NEGATIVE)  


 


Urine Nitrite  NEGATIVE    (NEGATIVE)  


 


Urine Bilirubin  SMALL H    (NEGATIVE)  


 


Urine Ictotest  NEGATIVE    


 


Urine Urobilinogen  0.2    (<2.0)  EU/dL


 


Ur Leukocyte Esterase  NEGATIVE    (NEGATIVE)  


 


Urine RBC  0-2    (0-2/HPF)  


 


Urine WBC  0-2    (0-5/HPF)  


 


Ur Epithelial Cells  FEW    (NONE-FEW)  


 


Urine Bacteria  FEW    (NEGATIVE)  


 


SARS-CoV-2 RNA (MIGUELANGEL)     (NEGATIVE)  














  01/14/21 01/14/21 01/14/21 Range/Units





  20:48 20:48 21:26 


 


WBC     (4.0-11.0)  K/uL


 


RBC     (4.30-5.90)  M/uL


 


Hgb     (12.0-16.0)  g/dL


 


Hct     (36.0-46.0)  %


 


MCV     (80.0-98.0)  fL


 


MCH     (27.0-32.0)  pg


 


MCHC     (31.0-37.0)  g/dL


 


RDW Std Deviation     (28.0-62.0)  fl


 


RDW Coeff of Flo     (11.0-15.0)  %


 


Plt Count     (150-400)  K/uL


 


MPV     (7.40-12.00)  fL


 


Neut % (Auto)     (48.0-80.0)  %


 


Lymph % (Auto)     (16.0-40.0)  %


 


Mono % (Auto)     (0.0-15.0)  %


 


Eos % (Auto)     (0.0-7.0)  %


 


Baso % (Auto)     (0.0-1.5)  %


 


Neut # (Auto)     (1.4-5.7)  K/uL


 


Lymph # (Auto)     (0.6-2.4)  K/uL


 


Mono # (Auto)     (0.0-0.8)  K/uL


 


Eos # (Auto)     (0.0-0.7)  K/uL


 


Baso # (Auto)     (0.0-0.1)  K/uL


 


Nucleated RBC %     /100WBC


 


Nucleated RBCs #     K/uL


 


ESR  48 H    (0-19)  mm/hr


 


Sodium     (136-145)  mmol/L


 


Potassium     (3.5-5.1)  mmol/L


 


Chloride     ()  mmol/L


 


Carbon Dioxide     (21.0-32.0)  mmol/L


 


BUN     (7.0-18.0)  mg/dL


 


Creatinine     (0.6-1.0)  mg/dL


 


Est Cr Clr Drug Dosing     mL/min


 


Estimated GFR (MDRD)     ml/min


 


Glucose     ()  mg/dL


 


Calcium     (8.5-10.1)  mg/dL


 


Total Bilirubin     (0.2-1.0)  mg/dL


 


AST     (15-37)  IU/L


 


ALT     (14-63)  IU/L


 


Alkaline Phosphatase     ()  U/L


 


Total Protein     (6.4-8.2)  g/dL


 


Albumin     (3.4-5.0)  g/dL


 


Globulin     (2.6-4.0)  g/dL


 


Albumin/Globulin Ratio     (0.9-1.6)  


 


HCG, Qual   NEGATIVE   (NEG)  


 


Urine Color     


 


Urine Appearance     


 


Urine pH     (5.0-8.0)  


 


Ur Specific Gravity     (1.001-1.035)  


 


Urine Protein     (NEGATIVE)  mg/dL


 


Urine Glucose (UA)     (NEGATIVE)  mg/dL


 


Urine Ketones     (NEGATIVE)  mg/dL


 


Urine Occult Blood     (NEGATIVE)  


 


Urine Nitrite     (NEGATIVE)  


 


Urine Bilirubin     (NEGATIVE)  


 


Urine Ictotest     


 


Urine Urobilinogen     (<2.0)  EU/dL


 


Ur Leukocyte Esterase     (NEGATIVE)  


 


Urine RBC     (0-2/HPF)  


 


Urine WBC     (0-5/HPF)  


 


Ur Epithelial Cells     (NONE-FEW)  


 


Urine Bacteria     (NEGATIVE)  


 


SARS-CoV-2 RNA (MIGUELANGEL)    NEGATIVE  (NEGATIVE)  











Meds: 


Medications














Discontinued Medications














Generic Name Dose Route Start Last Admin





  Trade Name Moni  PRN Reason Stop Dose Admin


 


Hydrocodone Bitart/Acetaminophen  1 tab  01/14/21 21:40  01/14/21 21:47





  Norco 325-10 Mg  PO  01/14/21 21:41  1 tab





  ONETIME ONE   Administration


 


Dexamethasone  6 mg  01/14/21 21:17  01/14/21 21:31





  Decadron  IM  01/14/21 21:18  6 mg





  STAT STA   Administration














Departure





- Departure


Time of Disposition: 22:27


Disposition: Home, Self-Care 01


Condition: Good


Clinical Impression: 


 Polyarticular arthritis








- Discharge Information


Prescriptions: 


methylPREDNISolone [Medrol Dose Pack] 4 mg PO DAILY #21 tab


Acetaminophen/HYDROcodone [Norco 325-10 MG] 1 tab PO Q6H PRN #14 tablet


 PRN Reason: Pain (Severe 7-10)


Instructions:  Arthritis, Easy-to-Read


Referrals: 


PCP,None [Primary Care Provider] - 


Forms:  ED Department Discharge


Additional Instructions: 


The sodium in your blood and the albumin in your blood are little bit low.  This

may be dietary.  You might want to increase her protein intake and restrict free

water adding some things like soups and sports drinks.





This may also be a clue to your doctor to help point in the right direction to 

find a cause of your other symptoms.





Keep your appointment with your doctor in 5 days but let them know we sent some 

of the  lupus tests before we started steroids





[facility] - Primary Care


[address] [place] [id number]


Phone: [phone]


Fax: [phone]








[place] [id number]


Phone: [phone]


Fax: [phone]





The following information is given to patients seen in the emergency department 

who are being discharged to home. This information is to outline your options 

for follow-up care. We provide all patients seen in our emergency department 

with a follow-up referral.





The need for follow-up, as well as the timing and circumstances, are variable 

depending upon the specifics of your emergency department visit.





If you don't have a primary care physician on staff, we will provide you with a 

referral. We always advise you to contact your personal physician following an 

emergency department visit to inform them of the circumstance of the visit and 

for follow-up with them and/or the need for any referrals to a consulting 

specialist.





The emergency department will also refer you to a specialist when appropriate. 

This referral assures that you have the opportunity for follow-up care with a s

pecialist. All of these measure are taken in an effort to provide you with 

optimal care, which includes your follow-up.





Under all circumstances we always encourage you to contact your private physici

an who remains a resource for coordinating your care. When calling for follow-up

care, please make the office aware that this follow-up is from your recent 

emergency room visit. If for any reason you are refused follow-up, please 

contact the [facility] Emergency Department

at [phone] and asked to speak to the emergency department charge nurse.








Sepsis Event Note (ED)





- Evaluation


Sepsis Screening Result: No Definite Risk





- Focused Exam


Vital Signs: 


                                   Vital Signs











  Temp Pulse Resp BP Pulse Ox


 


 01/14/21 19:42  36.2 C  113 H  20  166/117 H  96














- My Orders


Last 24 Hours: 


My Active Orders





01/14/21 20:48


JANIE W/RFX TO ALL IF POSITIVE [REF] Stat 


ANTI-DNA (DS) AB QN [REF] Stat 


COMPLEMENT, TOTAL (CH50) [REF] Stat 














- Assessment/Plan


Last 24 Hours: 


My Active Orders





01/14/21 20:48


JANIE W/RFX TO ALL IF POSITIVE [REF] Stat 


ANTI-DNA (DS) AB QN [REF] Stat 


COMPLEMENT, TOTAL (CH50) [REF] Stat

## 2021-01-25 ENCOUNTER — HOSPITAL ENCOUNTER (EMERGENCY)
Dept: HOSPITAL 56 - MW.ED | Age: 36
Discharge: HOME | End: 2021-01-25
Payer: SELF-PAY

## 2021-01-25 VITALS — HEART RATE: 94 BPM | DIASTOLIC BLOOD PRESSURE: 117 MMHG | SYSTOLIC BLOOD PRESSURE: 170 MMHG

## 2021-01-25 DIAGNOSIS — Z79.899: ICD-10-CM

## 2021-01-25 DIAGNOSIS — F17.200: ICD-10-CM

## 2021-01-25 DIAGNOSIS — I10: ICD-10-CM

## 2021-01-25 DIAGNOSIS — J45.909: ICD-10-CM

## 2021-01-25 DIAGNOSIS — R60.1: Primary | ICD-10-CM

## 2021-01-25 LAB
BUN SERPL-MCNC: 7 MG/DL (ref 7–18)
CHLORIDE SERPL-SCNC: 100 MMOL/L (ref 98–107)
CO2 SERPL-SCNC: 26 MMOL/L (ref 21–32)
GLUCOSE SERPL-MCNC: 92 MG/DL (ref 74–106)
POTASSIUM SERPL-SCNC: 4 MMOL/L (ref 3.5–5.1)
SODIUM SERPL-SCNC: 137 MMOL/L (ref 136–145)

## 2021-01-25 NOTE — CR
Indication:



Pain and shortness of breath



Technique:



Chest 1 view



Comparison:



Chest x-ray 07/24/2020



Findings/Impression:



Cardiovascular and mediastinum: Heart size and vasculature are normal in 

caliber and appearance. 



Lungs and pleural space: Lungs are clear.  No sign of infiltrate or mass.  

No sign of pleural effusion.  No pneumothorax.  



Bones and soft tissues: No acute findings.



Dictated by Darnell Arvizu MD @ Jan 25 2021  7:52PM



Signed by Dr. Darnell Arvizu @ Jan 25 2021  7:53PM

## 2021-01-25 NOTE — EDM.PDOC
ED HPI GENERAL MEDICAL PROBLEM





- General


Chief Complaint: Upper Extremity Injury/Pain


Stated Complaint: SWOLLEN ON BOTH HANDS AND LEG


Time Seen by Provider: 01/25/21 16:59


Source of Information: Reports: Patient


History Limitations: Reports: No Limitations





- History of Present Illness


INITIAL COMMENTS - FREE TEXT/NARRATIVE: 


HISTORY AND PHYSICAL:





History of present illness:


Patient is a 35-year-old female who presents to the ED today with concern of 

bilateral hand and bilateral foot/calf swelling that has been ongoing for 

several weeks / 1 month.  Patient states she initially had swelling in her hands

and came to the ER at that time and had that evaluated.  Patient states that she

had followed up with her primary care provider 2 times now.  Patient states that

the swelling has increased and is gone to her lower feet.  Patient states that 

she came to the ED 2 weeks ago and had a full evaluation of her hands and feet 

being swollen and was instructed to follow-up with her primary care provider.  

Patient states that she was tested for her thyroid, vitamin D, folate, vitamin 

B12, and lupus and states that she was started on some vitamin supplements.  

Patient states that the swelling of her feet has spread up to her bilateral 

calves is what brought her into the emergency room today to be evaluated as she 

was concerned about possible blood clots of her calves.  Patient is denies any 

change in the swelling of her hands and her feet and states that it is actually 

somewhat improved although it is spread into her calves.  Patient denies any 

pain at this time.  Patient denies any redness or warmth of her hands or her 

feet or any other associated symptoms.  Patient states she does have a history 

of alcohol abuse but has stopped drinking over the past several weeks due to the

symptoms.  Patient denies any other symptoms or concerns.





Patient denies fever, chills, chest pain, shortness of breath, or cough. Denies 

headache, neck stiff ness, change in vision, syncope, or near syncope. Denies 

nausea, vomiting, abdominal pain, diarrhea, constipation, or dysuria. Has not 

noted any blood in urine or stool. Patient has been eating and drinking 

appropriately.





Review of systems: 


As per history of present illness and below otherwise all systems reviewed and 

negative.





Past medical history: 


As per history of present illness and as reviewed below otherwise noncon

tributory.





Surgical history: 


As per history of present illness and as reviewed below otherwise 

noncontributory.





Social history: 


See social history for further information





Family history: 


As per history of present illness and as reviewed below otherwise 

noncontributory.





Physical exam:


General: Patient is alert, oriented, and in no acute distress. Patient sitting 

comfortably on exam table. Vitals stable and reviewed by me.


HEENT: Atraumatic, normocephalic, pupils equal and reactive bilaterally, 

negative for conjunctival pallor or scleral icterus, mucous membranes moist, TMs

normal bilaterally, throat clear, neck supple, nontender, trachea midline. No 

drooling or trismus noted. No meningeal signs. No hot potato voice noted. 


Lungs: Clear to auscultation, breath sounds equal bilaterally, chest nontender.


Heart: S1S2, regular rate and rhythm without overt murmur


Abdomen: Soft, nondistended, nontender. Negative for masses or 

hepatosplenomegaly. Negative for costovertebral tenderness.


Pelvis: Stable nontender.


Genitourinary: Deferred.


Rectal: Deferred.


Skin: Intact, warm, dry. No lesions or rashes noted.


Extremities: Patents bilateral hands are moderately edematous, 2+ non pitting 

edema. Patients bilateral feet/ankles/distal calves are moderately edematous 

with 2+ non pitting edema.  Radial pulses are grossly intact bilaterally with 

capillary refill less than 2 seconds.  Dorsalis pedis and posterior tibial 

pulses are grossly intact with capillary refill less than 2 seconds.  There is 

no erythema, drainage, rashes, lesions, or warmth of all extremities.  

Otherwise, atraumatic, negative for cords or calf pain. Neurovascular 

unremarkable.


Neuro: Awake, alert, oriented. Cranial nerves II through XII unremarkable. 

Cerebellum unremarkable. Motor and sensory unremarkable throughout. Exam 

nonfocal.





Notes:


Dr. Moreno verbally involved in patient care.


Discussed the importance for follow-up with her primary care provider / 

rheumatology / nephrology.


Signs and symptoms that were prompt return to the ED thoroughly discussed with 

patient.


Voices understanding and is agreeable to plan of care. Denies any further 

questions or concerns at this time.





Diagnostics:


CBC, CMP, UA, INR/PT, CXR, BNP, Bilateral lower extremity venous doppler





Therapeutics:


None





Prescription:


None





Impression: 


Anasarca, unspecified





Plan:








Definitive disposition and diagnosis as appropriate pending reevaluation and 

review of above.





  ** hands


Pain Score (Numeric/FACES): 20





- Related Data


                                    Allergies











Allergy/AdvReac Type Severity Reaction Status Date / Time


 


No Known Allergies Allergy   Verified 01/14/21 19:41











Home Meds: 


                                    Home Meds





hydroCHLOROthiazide [Hydrochlorothiazide] 12.5 mg PO DAILY #30 cap 12/26/20 [Rx]


Acetaminophen/HYDROcodone [Norco 325-10 MG] 1 tab PO Q6H PRN #14 tablet 01/14/21

[Rx]


methylPREDNISolone [Medrol Dose Pack] 4 mg PO DAILY #21 tab 01/14/21 [Rx]











Past Medical History





- Past Health History


Medical/Surgical History: Denies Medical/Surgical History


HEENT History: Reports: Other (See Below)


Other HEENT History: Ulcers in both eyes, previous abcess in R side of mouth, 

glasses


Cardiovascular History: Reports: Hypertension


Respiratory History: Reports: Asthma


Gastrointestinal History: Reports: None


Other Gastrointestinal History: coloitis


Genitourinary History: Reports: None


OB/GYN History: Reports: Other (See Below)


Other OB/GYN History: migraines when menstruating


Musculoskeletal History: Reports: None


Neurological History: Reports: None


Psychiatric History: Reports: Addiction, Anxiety, Depression, Panic Attack, 

Suicide Attempt, Suicidal Ideation


Other Psychiatric History: ETOH addiction.  has been to rehab twice, quit on own

 as well


Endocrine/Metabolic History: Reports: None


Insulin Pump Model and : N/A


Hematologic History: Reports: None


Immunologic History: Reports: None


Oncologic (Cancer) History: Reports: None


Dermatologic History: Reports: None





- Infectious Disease History


Infectious Disease History: Reports: Chicken Pox





- Past Surgical History


Head Surgeries/Procedures: Reports: None


HEENT Surgical History: Reports: None


Cardiovascular Surgical History: Reports: None


Respiratory Surgical History: Reports: None


GI Surgical History: Reports: None


Endocrine Surgical History: Reports: None


Neurological Surgical History: Reports: None


Musculoskeletal Surgical History: Reports: None


Dermatological Surgical History: Reports: None





Social & Family History





- Family History


Family Medical History: No Pertinent Family History


HEENT: Reports: None


Cardiac: Reports: None


Endocrine/Metabolic: Reports: None, Diabetes, Type I





- Tobacco Use


Packs/Tins Daily: 1.5





- Caffeine Use


Caffeine Use: Reports: None


Other Caffeine Use: rarely





- Alcohol Use


Days Per Week of Alcohol Use: 7


Number of Drinks Per Day: 4


Total Drinks Per Week: 28





- Recreational Drug Use


Recreational Drug Use: No





Review of Systems





- Review of Systems


Review Of Systems: Comprehensive ROS is negative, except as noted in HPI.





ED EXAM, GENERAL





- Physical Exam


Exam: See Below (see dictation)





Course





- Vital Signs


Last Recorded V/S: 


                                Last Vital Signs











Temp  98 F   01/25/21 17:20


 


Pulse  100   01/25/21 18:04


 


Resp  16   01/25/21 18:04


 


BP  165/111 H  01/25/21 18:04


 


Pulse Ox  96   01/25/21 18:04














- Orders/Labs/Meds


Orders: 


                               Active Orders 24 hr











 Category Date Time Status


 


 EKG Documentation Completion [RC] STAT Care  01/25/21 18:26 Active











Labs: 


                                Laboratory Tests











  01/25/21 01/25/21 01/25/21 Range/Units





  18:42 18:42 18:45 


 


WBC     (4.0-11.0)  K/uL


 


RBC     (4.30-5.90)  M/uL


 


Hgb     (12.0-16.0)  g/dL


 


Hct     (36.0-46.0)  %


 


MCV     (80.0-98.0)  fL


 


MCH     (27.0-32.0)  pg


 


MCHC     (31.0-37.0)  g/dL


 


RDW Std Deviation     (28.0-62.0)  fl


 


RDW Coeff of Flo     (11.0-15.0)  %


 


Plt Count     (150-400)  K/uL


 


MPV     (7.40-12.00)  fL


 


Neut % (Auto)     (48.0-80.0)  %


 


Lymph % (Auto)     (16.0-40.0)  %


 


Mono % (Auto)     (0.0-15.0)  %


 


Eos % (Auto)     (0.0-7.0)  %


 


Baso % (Auto)     (0.0-1.5)  %


 


Neut # (Auto)     (1.4-5.7)  K/uL


 


Lymph # (Auto)     (0.6-2.4)  K/uL


 


Mono # (Auto)     (0.0-0.8)  K/uL


 


Eos # (Auto)     (0.0-0.7)  K/uL


 


Baso # (Auto)     (0.0-0.1)  K/uL


 


Nucleated RBC %     /100WBC


 


Nucleated RBCs #     K/uL


 


INR   0.99   


 


Lactate     (0.20-2.00)  mmol/L


 


Sodium     (136-145)  mmol/L


 


Potassium     (3.5-5.1)  mmol/L


 


Chloride     ()  mmol/L


 


Carbon Dioxide     (21.0-32.0)  mmol/L


 


BUN     (7.0-18.0)  mg/dL


 


Creatinine     (0.6-1.0)  mg/dL


 


Est Cr Clr Drug Dosing     mL/min


 


Estimated GFR (MDRD)     ml/min


 


Glucose     ()  mg/dL


 


Calcium     (8.5-10.1)  mg/dL


 


Total Bilirubin     (0.2-1.0)  mg/dL


 


AST     (15-37)  IU/L


 


ALT     (14-63)  IU/L


 


Alkaline Phosphatase     ()  U/L


 


B-Natriuretic Peptide  16    (<100)  PG/ML


 


Total Protein     (6.4-8.2)  g/dL


 


Albumin     (3.4-5.0)  g/dL


 


Globulin     (2.6-4.0)  g/dL


 


Albumin/Globulin Ratio     (0.9-1.6)  


 


Urine Color    YELLOW  


 


Urine Appearance    CLEAR  


 


Urine pH    6.0  (5.0-8.0)  


 


Ur Specific Gravity    1.005  (1.001-1.035)  


 


Urine Protein    NEGATIVE  (NEGATIVE)  mg/dL


 


Urine Glucose (UA)    NEGATIVE  (NEGATIVE)  mg/dL


 


Urine Ketones    NEGATIVE  (NEGATIVE)  mg/dL


 


Urine Occult Blood    TRACE H  (NEGATIVE)  


 


Urine Nitrite    NEGATIVE  (NEGATIVE)  


 


Urine Bilirubin    NEGATIVE  (NEGATIVE)  


 


Urine Urobilinogen    0.2  (<2.0)  EU/dL


 


Ur Leukocyte Esterase    NEGATIVE  (NEGATIVE)  


 


Urine RBC    0-2  (0-2/HPF)  


 


Urine WBC    0-1  (0-5/HPF)  


 


Ur Epithelial Cells    RARE  (NONE-FEW)  


 


Urine Bacteria    RARE  (NEGATIVE)  


 


Urine HCG, Qual     (NEGATIVE)  














  01/25/21 01/25/21 01/25/21 Range/Units





  18:45 18:48 18:48 


 


WBC   14.81 H   (4.0-11.0)  K/uL


 


RBC   4.01 L   (4.30-5.90)  M/uL


 


Hgb   13.3   (12.0-16.0)  g/dL


 


Hct   40.6   (36.0-46.0)  %


 


MCV   101.2 H   (80.0-98.0)  fL


 


MCH   33.2 H   (27.0-32.0)  pg


 


MCHC   32.8   (31.0-37.0)  g/dL


 


RDW Std Deviation   52.1   (28.0-62.0)  fl


 


RDW Coeff of Flo   14   (11.0-15.0)  %


 


Plt Count   317   (150-400)  K/uL


 


MPV   9.30   (7.40-12.00)  fL


 


Neut % (Auto)   77.6   (48.0-80.0)  %


 


Lymph % (Auto)   11.9 L   (16.0-40.0)  %


 


Mono % (Auto)   10.0   (0.0-15.0)  %


 


Eos % (Auto)   0.4   (0.0-7.0)  %


 


Baso % (Auto)   0.1   (0.0-1.5)  %


 


Neut # (Auto)   11.5 H   (1.4-5.7)  K/uL


 


Lymph # (Auto)   1.8   (0.6-2.4)  K/uL


 


Mono # (Auto)   1.5 H   (0.0-0.8)  K/uL


 


Eos # (Auto)   0.1   (0.0-0.7)  K/uL


 


Baso # (Auto)   0.0   (0.0-0.1)  K/uL


 


Nucleated RBC %   0.0   /100WBC


 


Nucleated RBCs #   0   K/uL


 


INR     


 


Lactate     (0.20-2.00)  mmol/L


 


Sodium    137  (136-145)  mmol/L


 


Potassium    4.0  (3.5-5.1)  mmol/L


 


Chloride    100  ()  mmol/L


 


Carbon Dioxide    26.0  (21.0-32.0)  mmol/L


 


BUN    7  (7.0-18.0)  mg/dL


 


Creatinine    0.5 L  (0.6-1.0)  mg/dL


 


Est Cr Clr Drug Dosing    138.19  mL/min


 


Estimated GFR (MDRD)    > 60.0  ml/min


 


Glucose    92  ()  mg/dL


 


Calcium    9.2  (8.5-10.1)  mg/dL


 


Total Bilirubin    0.6  (0.2-1.0)  mg/dL


 


AST    15  (15-37)  IU/L


 


ALT    14  (14-63)  IU/L


 


Alkaline Phosphatase    67  ()  U/L


 


B-Natriuretic Peptide     (<100)  PG/ML


 


Total Protein    7.6  (6.4-8.2)  g/dL


 


Albumin    2.5 L  (3.4-5.0)  g/dL


 


Globulin    5.1 H  (2.6-4.0)  g/dL


 


Albumin/Globulin Ratio    0.5 L  (0.9-1.6)  


 


Urine Color     


 


Urine Appearance     


 


Urine pH     (5.0-8.0)  


 


Ur Specific Gravity     (1.001-1.035)  


 


Urine Protein     (NEGATIVE)  mg/dL


 


Urine Glucose (UA)     (NEGATIVE)  mg/dL


 


Urine Ketones     (NEGATIVE)  mg/dL


 


Urine Occult Blood     (NEGATIVE)  


 


Urine Nitrite     (NEGATIVE)  


 


Urine Bilirubin     (NEGATIVE)  


 


Urine Urobilinogen     (<2.0)  EU/dL


 


Ur Leukocyte Esterase     (NEGATIVE)  


 


Urine RBC     (0-2/HPF)  


 


Urine WBC     (0-5/HPF)  


 


Ur Epithelial Cells     (NONE-FEW)  


 


Urine Bacteria     (NEGATIVE)  


 


Urine HCG, Qual  NEGATIVE    (NEGATIVE)  














  01/25/21 Range/Units





  19:54 


 


WBC   (4.0-11.0)  K/uL


 


RBC   (4.30-5.90)  M/uL


 


Hgb   (12.0-16.0)  g/dL


 


Hct   (36.0-46.0)  %


 


MCV   (80.0-98.0)  fL


 


MCH   (27.0-32.0)  pg


 


MCHC   (31.0-37.0)  g/dL


 


RDW Std Deviation   (28.0-62.0)  fl


 


RDW Coeff of Flo   (11.0-15.0)  %


 


Plt Count   (150-400)  K/uL


 


MPV   (7.40-12.00)  fL


 


Neut % (Auto)   (48.0-80.0)  %


 


Lymph % (Auto)   (16.0-40.0)  %


 


Mono % (Auto)   (0.0-15.0)  %


 


Eos % (Auto)   (0.0-7.0)  %


 


Baso % (Auto)   (0.0-1.5)  %


 


Neut # (Auto)   (1.4-5.7)  K/uL


 


Lymph # (Auto)   (0.6-2.4)  K/uL


 


Mono # (Auto)   (0.0-0.8)  K/uL


 


Eos # (Auto)   (0.0-0.7)  K/uL


 


Baso # (Auto)   (0.0-0.1)  K/uL


 


Nucleated RBC %   /100WBC


 


Nucleated RBCs #   K/uL


 


INR   


 


Lactate  0.8  (0.20-2.00)  mmol/L


 


Sodium   (136-145)  mmol/L


 


Potassium   (3.5-5.1)  mmol/L


 


Chloride   ()  mmol/L


 


Carbon Dioxide   (21.0-32.0)  mmol/L


 


BUN   (7.0-18.0)  mg/dL


 


Creatinine   (0.6-1.0)  mg/dL


 


Est Cr Clr Drug Dosing   mL/min


 


Estimated GFR (MDRD)   ml/min


 


Glucose   ()  mg/dL


 


Calcium   (8.5-10.1)  mg/dL


 


Total Bilirubin   (0.2-1.0)  mg/dL


 


AST   (15-37)  IU/L


 


ALT   (14-63)  IU/L


 


Alkaline Phosphatase   ()  U/L


 


B-Natriuretic Peptide   (<100)  PG/ML


 


Total Protein   (6.4-8.2)  g/dL


 


Albumin   (3.4-5.0)  g/dL


 


Globulin   (2.6-4.0)  g/dL


 


Albumin/Globulin Ratio   (0.9-1.6)  


 


Urine Color   


 


Urine Appearance   


 


Urine pH   (5.0-8.0)  


 


Ur Specific Gravity   (1.001-1.035)  


 


Urine Protein   (NEGATIVE)  mg/dL


 


Urine Glucose (UA)   (NEGATIVE)  mg/dL


 


Urine Ketones   (NEGATIVE)  mg/dL


 


Urine Occult Blood   (NEGATIVE)  


 


Urine Nitrite   (NEGATIVE)  


 


Urine Bilirubin   (NEGATIVE)  


 


Urine Urobilinogen   (<2.0)  EU/dL


 


Ur Leukocyte Esterase   (NEGATIVE)  


 


Urine RBC   (0-2/HPF)  


 


Urine WBC   (0-5/HPF)  


 


Ur Epithelial Cells   (NONE-FEW)  


 


Urine Bacteria   (NEGATIVE)  


 


Urine HCG, Qual   (NEGATIVE)  














Departure





- Departure


Time of Disposition: 20:55


Disposition: Home, Self-Care 01


Clinical Impression: 


 Anasarca








- Discharge Information


Instructions:  Ed[place], Easy-to-Read


Referrals: 


PCP,None [Primary Care Provider] - 


Forms:  ED Department Discharge


Additional Instructions: 


The following information is given to patients seen in the emergency department 

who are being discharged to home. This information is to outline your options 

for follow-up care. We provide all patients seen in our emergency department 

with a follow-up referral.





The need for follow-up, as well as the timing and circumstances, are variable d

epending upon the specifics of your emergency department visit.





If you don't have a primary care physician on staff, we will provide you with a 

referral. We always advise you to contact your personal physician following an 

emergency department visit to inform them of the circumstance of the visit and 

for follow-up with them and/or the need for any referrals to a consulting 

specialist.





The emergency department will also refer you to a specialist when appropriate. 

This referral assures that you have the opportunity for follow-up care with a 

specialist. All of these measure are taken in an effort to provide you with 

optimal care, which includes your follow-up.





Under all circumstances we always encourage you to contact your private 

physician who remains a resource for coordinating your care. When calling for 

follow-up care, please make the office aware that this follow-up is from your 

recent emergency room visit. If for any reason you are refused follow-up, please

contact the [facility] Emergency Department

at [phone] and asked to speak to the emergency department charge nurse.





[facility]


Primary Care


[address] [place], [place] [address]


Phone: [phone]


Fax: [phone]





[place] [address]


Phone: [phone]


Fax: [phone]








1. Follow up with your primary care provider, rheumatology, and nephrology as 

discussed. Return to the ED as needed and as discussed.


 











Sepsis Event Note (ED)





- Evaluation


Sepsis Screening Result: No Definite Risk





- Focused Exam


Vital Signs: 


                                   Vital Signs











  Temp Pulse Resp BP Pulse Ox


 


 01/25/21 18:04   100  16  165/111 H  96


 


 01/25/21 17:20  98 F  104 H  22 H  172/115 H  100














- My Orders


Last 24 Hours: 


My Active Orders





01/25/21 18:26


EKG Documentation Completion [RC] STAT 














- Assessment/Plan


Last 24 Hours: 


My Active Orders





01/25/21 18:26


EKG Documentation Completion [RC] STAT

## 2021-01-25 NOTE — US
INDICATION:



Leg pain and swelling.



TECHNIQUE:



Ultrasound venous duplex lower extremity bilateral.  Compression venous 

exam was performed using gray-scale, color Doppler, and spectral Doppler 

imaging.



COMPARISON:



None. 



FINDINGS:



Sonographic imaging demonstrates the common femoral, deep femoral, 

superficial femoral, popliteal, posterior tibial and greater saphenous 

veins to be fully compressible with normal color Doppler blood flow in both 

lower extremities. Hypoechoic fluid collection left popliteal fossa 

measuring 4.1 centimeters which is avascular. Hypoechoic avascular fluid 

collection right popliteal fossa measuring up to 3.6 centimeters. 



IMPRESSION:



1. No evidence of deep venous thrombosis bilateral lower extremities.



2. Bilateral Baker cysts.



Dictated by Darnell Arvizu MD @ Jan 25 2021  7:52PM



Signed by Dr. Darnell Arvizu @ Jan 25 2021  7:55PM

## 2021-01-30 ENCOUNTER — HOSPITAL ENCOUNTER (EMERGENCY)
Dept: HOSPITAL 56 - MW.ED | Age: 36
Discharge: HOME | End: 2021-01-30
Payer: SELF-PAY

## 2021-01-30 VITALS — HEART RATE: 110 BPM | SYSTOLIC BLOOD PRESSURE: 146 MMHG | DIASTOLIC BLOOD PRESSURE: 97 MMHG

## 2021-01-30 DIAGNOSIS — Z72.0: ICD-10-CM

## 2021-01-30 DIAGNOSIS — R56.9: Primary | ICD-10-CM

## 2021-01-30 DIAGNOSIS — J45.909: ICD-10-CM

## 2021-01-30 DIAGNOSIS — I10: ICD-10-CM

## 2021-01-30 LAB
BUN SERPL-MCNC: 7 MG/DL (ref 7–18)
CHLORIDE SERPL-SCNC: 100 MMOL/L (ref 98–107)
CO2 SERPL-SCNC: 24.8 MMOL/L (ref 21–32)
GLUCOSE SERPL-MCNC: 129 MG/DL (ref 74–106)
POTASSIUM SERPL-SCNC: 3.4 MMOL/L (ref 3.5–5.1)
SODIUM SERPL-SCNC: 141 MMOL/L (ref 136–145)

## 2021-01-30 PROCEDURE — 84703 CHORIONIC GONADOTROPIN ASSAY: CPT

## 2021-01-30 PROCEDURE — 99285 EMERGENCY DEPT VISIT HI MDM: CPT

## 2021-01-30 PROCEDURE — 80305 DRUG TEST PRSMV DIR OPT OBS: CPT

## 2021-01-30 PROCEDURE — 96365 THER/PROPH/DIAG IV INF INIT: CPT

## 2021-01-30 PROCEDURE — 82550 ASSAY OF CK (CPK): CPT

## 2021-01-30 PROCEDURE — 80053 COMPREHEN METABOLIC PANEL: CPT

## 2021-01-30 PROCEDURE — 80179 DRUG ASSAY SALICYLATE: CPT

## 2021-01-30 PROCEDURE — 85025 COMPLETE CBC W/AUTO DIFF WBC: CPT

## 2021-01-30 PROCEDURE — 96375 TX/PRO/DX INJ NEW DRUG ADDON: CPT

## 2021-01-30 PROCEDURE — 83605 ASSAY OF LACTIC ACID: CPT

## 2021-01-30 PROCEDURE — 93005 ELECTROCARDIOGRAM TRACING: CPT

## 2021-01-30 PROCEDURE — 83735 ASSAY OF MAGNESIUM: CPT

## 2021-01-30 PROCEDURE — 81001 URINALYSIS AUTO W/SCOPE: CPT

## 2021-01-30 PROCEDURE — 36415 COLL VENOUS BLD VENIPUNCTURE: CPT

## 2021-01-30 PROCEDURE — 70450 CT HEAD/BRAIN W/O DYE: CPT

## 2021-01-30 NOTE — EDM.PDOC
ED HPI GENERAL MEDICAL PROBLEM





- General


Chief Complaint: Neuro Symptoms/Deficits


Stated Complaint: SEIZURES


Time Seen by Provider: 01/30/21 15:40


Source of Information: Reports: Patient


History Limitations: Reports: No Limitations





- History of Present Illness


INITIAL COMMENTS - FREE TEXT/NARRATIVE: 





35-year-old female past medical history anxiety, uncontrolled hypertension, alc

ohol use disorder, history of alcohol withdrawal, panic attacks, being worked up

for unknown kidney pathology presents status post seizure.  History is from 

patient and EMS.  Patient was apparently in normal state of health today and 

feeling very panicky when she had what appeared to be seizure-like activity 

lasting approximately 1 minute.  Patient does not recall this event.  Patient 

has been under increased emotional stress and broke up with her boyfriend 

yesterday.  Patient has been seen in the emergency department a few times 

recently for swelling in her hands and feet and had an extensive work-up 

including lupus labs sent from the emergency department which were all normal.  

She is scheduled to follow-up with a nephrologist and a rheumatologist in [place].

 She currently feels mild shortness of breath, headache, body aches.  She does 

not have any tongue lacerations nor urinary incontinence.  She notes that she 

drank very heavily last night and has had nothing to drink today.





- Related Data


                                    Allergies











Allergy/AdvReac Type Severity Reaction Status Date / Time


 


No Known Allergies Allergy   Verified 01/30/21 15:44











Home Meds: 


                                    Home Meds





Cholecalciferol (Vitamin D3) [Vitamin D]  01/30/21 [History]


Cyanocobalamin (Vitamin B-12) [Vitamin B-12]  01/30/21 [History]


Folic Acid  01/30/21 [History]


Furosemide  01/30/21 [History]


chlordiazePOXIDE [Librium] 25 mg PO ASDIRECTED #15 cap 01/30/21 [Rx]











Past Medical History





- Past Health History


Medical/Surgical History: Denies Medical/Surgical History


HEENT History: Reports: Other (See Below)


Other HEENT History: Ulcers in both eyes, previous abcess in R side of mouth, 

glasses


Cardiovascular History: Reports: Hypertension


Respiratory History: Reports: Asthma


Gastrointestinal History: Reports: None


Other Gastrointestinal History: coloitis


Genitourinary History: Reports: None


OB/GYN History: Reports: Other (See Below)


Other OB/GYN History: migraines when menstruating


Musculoskeletal History: Reports: None


Neurological History: Reports: None


Psychiatric History: Reports: Addiction, Anxiety, Depression, Panic Attack, 

Suicide Attempt, Suicidal Ideation


Other Psychiatric History: ETOH addiction.  has been to rehab twice, quit on own

 as well


Endocrine/Metabolic History: Reports: None


Insulin Pump Model and : N/A


Hematologic History: Reports: None


Immunologic History: Reports: None


Oncologic (Cancer) History: Reports: None


Dermatologic History: Reports: None





- Infectious Disease History


Infectious Disease History: Reports: Chicken Pox





- Past Surgical History


Head Surgeries/Procedures: Reports: None


HEENT Surgical History: Reports: None


Cardiovascular Surgical History: Reports: None


Respiratory Surgical History: Reports: None


GI Surgical History: Reports: None


Endocrine Surgical History: Reports: None


Neurological Surgical History: Reports: None


Musculoskeletal Surgical History: Reports: None


Dermatological Surgical History: Reports: None





Social & Family History





- Family History


Family Medical History: No Pertinent Family History


HEENT: Reports: None


Cardiac: Reports: None


Endocrine/Metabolic: Reports: None, Diabetes, Type I





- Tobacco Use


Tobacco Use Status *Q: Current Every Day Tobacco User


Years of Tobacco use: 10


Packs/Tins Daily: 1





- Caffeine Use


Caffeine Use: Reports: None


Other Caffeine Use: rarely





- Recreational Drug Use


Recreational Drug Use: No





ED ROS GENERAL





- Review of Systems


Review Of Systems: Comprehensive ROS is negative, except as noted in HPI.





ED EXAM, GENERAL





- Physical Exam


Exam: See Below


Exam Limited By: No Limitations


General Appearance: Alert, WD/WN, No Apparent Distress, Anxious


Eye Exam: Bilateral Eye: EOMI, PERRL


Ears: Normal External Exam


Throat/Mouth: Normal Inspection, Normal Oropharynx, Normal Voice, No Airway 

Compromise


Head: Atraumatic, Normocephalic


Neck: Normal Inspection, Supple


Respiratory/Chest: No Respiratory Distress, Lungs Clear, Normal Breath Sounds, 

No Accessory Muscle Use


Cardiovascular: Normal Peripheral Pulses, Tachycardia


GI/Abdominal: Soft, Non-Tender


Extremities: Normal Inspection


Neurological: Alert, Oriented, CN II-XII Intact, Normal Cognition, No 

Motor/Sensory Deficits


Psychiatric: Anxious


Skin Exam: Warm, Dry, Intact, Normal Color, No Rash


  ** #1 Interpretation


EKG Date: 01/30/21


Time: 15:37


Rhythm: Other (sinus tachycardia)


Rate (Beats/Min): 115


Axis: Normal


P-Wave: Present


QRS: Normal


ST-T: Normal


QT: Normal


WI/PQ Interval: 172


Comparison: NA - No Prior EKG


EKG Interpretation Comments: 





sinus tachycardia w/ normal axis/intervals, no acute ischemic changes





Course





- Vital Signs


Last Recorded V/S: 


                                Last Vital Signs











Temp  98.9 F   01/30/21 15:38


 


Pulse  115 H  01/30/21 15:57


 


Resp  18   01/30/21 15:57


 


BP  152/93 H  01/30/21 15:57


 


Pulse Ox  97   01/30/21 15:57














- Orders/Labs/Meds


Orders: 


                               Active Orders 24 hr











 Category Date Time Status


 


 EKG Documentation Completion [RC] STAT Care  01/30/21 15:36 Active


 


 Magnesium Sulfate/Water [Magnesium Sulfate in Water 2 Med  01/30/21 16:40 

Active





 GM/50 ML] 2 gm   





 Premix Bag 1 bag   





 IV ONETIME   


 


 Sodium Chloride 0.9% [Normal Saline] 1,000 ml Med  01/30/21 16:40 Active





 IV .Bolus   


 


 Sodium Chloride 0.9% [Saline Flush] Med  01/30/21 15:36 Active





 10 ml FLUSH ASDIRECTED PRN   


 


 Sodium Chloride 0.9% [Saline Flush] Med  01/30/21 15:36 Active





 2.5 ml FLUSH ASDIRECTED PRN   


 


 Saline Lock Insert [OM.PC] Stat Oth  01/30/21 15:36 Ordered








                                Medication Orders





Magnesium Sulfate 2 gm/ Premix  50 mls @ 50 mls/hr IV ONETIME ONE


   Stop: 01/30/21 17:39


   Last Admin: 01/30/21 16:45  Dose: 50 mls/hr


   Documented by: ELISABETH


Sodium Chloride (Normal Saline)  1,000 mls @ 999 mls/hr IV .Bolus ONE


   Stop: 01/30/21 17:40


   Last Admin: 01/30/21 16:49  Dose: 999 mls/hr


   Documented by: ELISABETH


Sodium Chloride (Saline Flush)  10 ml FLUSH ASDIRECTED PRN


   PRN Reason: Keep Vein Open


   Last Admin: 01/30/21 15:51  Dose: 10 ml


   Documented by: ELISABETH


Sodium Chloride (Saline Flush)  2.5 ml FLUSH ASDIRECTED PRN


   PRN Reason: Keep Vein Open


   Last Admin: 01/30/21 15:51  Dose: 2.5 ml


   Documented by: ELISABETH








Labs: 


                                Laboratory Tests











  01/30/21 01/30/21 01/30/21 Range/Units





  15:35 15:35 15:35 


 


WBC   16.17 H   (4.0-11.0)  K/uL


 


RBC   4.17 L   (4.30-5.90)  M/uL


 


Hgb   13.7   (12.0-16.0)  g/dL


 


Hct   41.9   (36.0-46.0)  %


 


MCV   100.5 H   (80.0-98.0)  fL


 


MCH   32.9 H   (27.0-32.0)  pg


 


MCHC   32.7   (31.0-37.0)  g/dL


 


RDW Std Deviation   51.7   (28.0-62.0)  fl


 


RDW Coeff of Flo   14   (11.0-15.0)  %


 


Plt Count   339   (150-400)  K/uL


 


MPV   9.20   (7.40-12.00)  fL


 


Neut % (Auto)   80.6 H   (48.0-80.0)  %


 


Lymph % (Auto)   11.9 L   (16.0-40.0)  %


 


Mono % (Auto)   6.5   (0.0-15.0)  %


 


Eos % (Auto)   0.8   (0.0-7.0)  %


 


Baso % (Auto)   0.2   (0.0-1.5)  %


 


Neut # (Auto)   13.0 H   (1.4-5.7)  K/uL


 


Lymph # (Auto)   1.9   (0.6-2.4)  K/uL


 


Mono # (Auto)   1.1 H   (0.0-0.8)  K/uL


 


Eos # (Auto)   0.1   (0.0-0.7)  K/uL


 


Baso # (Auto)   0.0   (0.0-0.1)  K/uL


 


Nucleated RBC %   0.0   /100WBC


 


Nucleated RBCs #   0   K/uL


 


Lactate  2.1 H*    (0.20-2.00)  mmol/L


 


Sodium    141  (136-145)  mmol/L


 


Potassium    3.4 L  (3.5-5.1)  mmol/L


 


Chloride    100  ()  mmol/L


 


Carbon Dioxide    24.8  (21.0-32.0)  mmol/L


 


BUN    7  (7.0-18.0)  mg/dL


 


Creatinine    0.7  (0.6-1.0)  mg/dL


 


Est Cr Clr Drug Dosing    98.71  mL/min


 


Estimated GFR (MDRD)    > 60.0  ml/min


 


Glucose    129 H  ()  mg/dL


 


Calcium    9.0  (8.5-10.1)  mg/dL


 


Magnesium    1.2 L  (1.8-2.4)  mg/dL


 


Total Bilirubin    0.3  (0.2-1.0)  mg/dL


 


AST    18  (15-37)  IU/L


 


ALT    11 L  (14-63)  IU/L


 


Alkaline Phosphatase    76  ()  U/L


 


Creatine Kinase    47  ()  U/L


 


Total Protein    8.5 H  (6.4-8.2)  g/dL


 


Albumin    2.9 L  (3.4-5.0)  g/dL


 


Globulin    5.6 H  (2.6-4.0)  g/dL


 


Albumin/Globulin Ratio    0.5 L  (0.9-1.6)  


 


HCG, Qual     (NEG)  


 


Urine Color     


 


Urine Appearance     


 


Urine pH     (5.0-8.0)  


 


Ur Specific Gravity     (1.001-1.035)  


 


Urine Protein     (NEGATIVE)  mg/dL


 


Urine Glucose (UA)     (NEGATIVE)  mg/dL


 


Urine Ketones     (NEGATIVE)  mg/dL


 


Urine Occult Blood     (NEGATIVE)  


 


Urine Nitrite     (NEGATIVE)  


 


Urine Bilirubin     (NEGATIVE)  


 


Urine Urobilinogen     (<2.0)  EU/dL


 


Ur Leukocyte Esterase     (NEGATIVE)  


 


Urine RBC     (0-2/HPF)  


 


Urine WBC     (0-5/HPF)  


 


Ur Epithelial Cells     (NONE-FEW)  


 


Urine Bacteria     (NEGATIVE)  


 


Urine Opiates Screen     (NEGATIVE)  


 


Ur Oxycodone Screen     (NEGATIVE)  


 


Urine Methadone Screen     (NEGATIVE)  


 


Ur Barbiturates Screen     (NEGATIVE)  


 


Ur Phencyclidine Scrn     (NEGATIVE)  


 


Ur Amphetamine Screen     (NEGATIVE)  


 


U Methamphetamines Scrn     (NEGATIVE)  


 


U Benzodiazepines Scrn     (NEGATIVE)  


 


U Cocaine Metab Screen     (NEGATIVE)  


 


U Marijuana (THC) Screen     (NEGATIVE)  


 


Ethyl Alcohol    < 3.0  mg/dL














  01/30/21 01/30/21 01/30/21 Range/Units





  15:35 16:39 16:54 


 


WBC     (4.0-11.0)  K/uL


 


RBC     (4.30-5.90)  M/uL


 


Hgb     (12.0-16.0)  g/dL


 


Hct     (36.0-46.0)  %


 


MCV     (80.0-98.0)  fL


 


MCH     (27.0-32.0)  pg


 


MCHC     (31.0-37.0)  g/dL


 


RDW Std Deviation     (28.0-62.0)  fl


 


RDW Coeff of Flo     (11.0-15.0)  %


 


Plt Count     (150-400)  K/uL


 


MPV     (7.40-12.00)  fL


 


Neut % (Auto)     (48.0-80.0)  %


 


Lymph % (Auto)     (16.0-40.0)  %


 


Mono % (Auto)     (0.0-15.0)  %


 


Eos % (Auto)     (0.0-7.0)  %


 


Baso % (Auto)     (0.0-1.5)  %


 


Neut # (Auto)     (1.4-5.7)  K/uL


 


Lymph # (Auto)     (0.6-2.4)  K/uL


 


Mono # (Auto)     (0.0-0.8)  K/uL


 


Eos # (Auto)     (0.0-0.7)  K/uL


 


Baso # (Auto)     (0.0-0.1)  K/uL


 


Nucleated RBC %     /100WBC


 


Nucleated RBCs #     K/uL


 


Lactate     (0.20-2.00)  mmol/L


 


Sodium     (136-145)  mmol/L


 


Potassium     (3.5-5.1)  mmol/L


 


Chloride     ()  mmol/L


 


Carbon Dioxide     (21.0-32.0)  mmol/L


 


BUN     (7.0-18.0)  mg/dL


 


Creatinine     (0.6-1.0)  mg/dL


 


Est Cr Clr Drug Dosing     mL/min


 


Estimated GFR (MDRD)     ml/min


 


Glucose     ()  mg/dL


 


Calcium     (8.5-10.1)  mg/dL


 


Magnesium     (1.8-2.4)  mg/dL


 


Total Bilirubin     (0.2-1.0)  mg/dL


 


AST     (15-37)  IU/L


 


ALT     (14-63)  IU/L


 


Alkaline Phosphatase     ()  U/L


 


Creatine Kinase     ()  U/L


 


Total Protein     (6.4-8.2)  g/dL


 


Albumin     (3.4-5.0)  g/dL


 


Globulin     (2.6-4.0)  g/dL


 


Albumin/Globulin Ratio     (0.9-1.6)  


 


HCG, Qual  NEGATIVE    (NEG)  


 


Urine Color   YELLOW   


 


Urine Appearance   CLEAR   


 


Urine pH   6.5   (5.0-8.0)  


 


Ur Specific Gravity   1.020   (1.001-1.035)  


 


Urine Protein   30 H   (NEGATIVE)  mg/dL


 


Urine Glucose (UA)   NEGATIVE   (NEGATIVE)  mg/dL


 


Urine Ketones   15 H   (NEGATIVE)  mg/dL


 


Urine Occult Blood   MODERATE H   (NEGATIVE)  


 


Urine Nitrite   NEGATIVE   (NEGATIVE)  


 


Urine Bilirubin   NEGATIVE   (NEGATIVE)  


 


Urine Urobilinogen   0.2   (<2.0)  EU/dL


 


Ur Leukocyte Esterase   NEGATIVE   (NEGATIVE)  


 


Urine RBC   2-3   (0-2/HPF)  


 


Urine WBC   0-1   (0-5/HPF)  


 


Ur Epithelial Cells   RARE   (NONE-FEW)  


 


Urine Bacteria   RARE   (NEGATIVE)  


 


Urine Opiates Screen    NEGATIVE  (NEGATIVE)  


 


Ur Oxycodone Screen    NEGATIVE  (NEGATIVE)  


 


Urine Methadone Screen    NEGATIVE  (NEGATIVE)  


 


Ur Barbiturates Screen    NEGATIVE  (NEGATIVE)  


 


Ur Phencyclidine Scrn    NEGATIVE  (NEGATIVE)  


 


Ur Amphetamine Screen    NEGATIVE  (NEGATIVE)  


 


U Methamphetamines Scrn    NEGATIVE  (NEGATIVE)  


 


U Benzodiazepines Scrn    NEGATIVE  (NEGATIVE)  


 


U Cocaine Metab Screen    NEGATIVE  (NEGATIVE)  


 


U Marijuana (THC) Screen    NEGATIVE  (NEGATIVE)  


 


Ethyl Alcohol     mg/dL











Meds: 


Medications











Generic Name Dose Route Start Last Admin





  Trade Name Freq  PRN Reason Stop Dose Admin


 


Magnesium Sulfate 2 gm/ Premix  50 mls @ 50 mls/hr  01/30/21 16:40  01/30/21 

16:45





  IV  01/30/21 17:39  50 mls/hr





  ONETIME ONE   Administration


 


Sodium Chloride  1,000 mls @ 999 mls/hr  01/30/21 16:40  01/30/21 16:49





  Normal Saline  IV  01/30/21 17:40  999 mls/hr





  .Bolus ONE   Administration


 


Sodium Chloride  10 ml  01/30/21 15:36  01/30/21 15:51





  Saline Flush  FLUSH   10 ml





  ASDIRECTED PRN   Administration





  Keep Vein Open  


 


Sodium Chloride  2.5 ml  01/30/21 15:36  01/30/21 15:51





  Saline Flush  FLUSH   2.5 ml





  ASDIRECTED PRN   Administration





  Keep Vein Open  














Discontinued Medications














Generic Name Dose Route Start Last Admin





  Trade Name Freq  PRN Reason Stop Dose Admin


 


Sodium Chloride  1,000 mls @ 999 mls/hr  01/30/21 15:36  01/30/21 15:51





  Normal Saline  IV  01/30/21 16:36  999 mls/hr





  .Bolus ONE   Administration


 


Lorazepam  2 mg  01/30/21 15:36  01/30/21 15:55





  Ativan  IVPUSH  01/30/21 15:37  2 mg





  ONETIME ONE   Administration














- Re-Assessments/Exams


Free Text/Narrative Re-Assessment/Exam: 





01/30/21 16:00


Patient presents with possible first-time seizure.  Will give Ativan 2 mg in 

setting of possible alcohol withdrawal as etiology.  Will get extensive work-up 

including head CT.  We will follow up results and disposition accordingly.


01/30/21 17:14


Will d/c with short course of librium taper for probable EtOH w/d. Patient is to

 f/u with specialists in [place]





Departure





- Departure


Time of Disposition: 17:12


Disposition: Home, Self-Care 01


Condition: Good


Clinical Impression: 


 Seizure








- Discharge Information


Prescriptions: 


chlordiazePOXIDE [Librium] 25 mg PO ASDIRECTED #15 cap


Instructions:  Seizure, Adult, Easy-to-Read


Referrals: 


PCP,Unknown [Primary Care Provider] - 


Forms:  ED Department Discharge


Additional Instructions: 


The following information is given to patients seen in the emergency department 

who are being discharged to home. This information is to outline your options 

for follow-up care. We provide all patients seen in our emergency department 

with a follow-up referral.





The need for follow-up, as well as the timing and circumstances, are variable 

depending upon the specifics of your emergency department visit.





If you don't have a primary care physician on staff, we will provide you with a 

referral. We always advise you to contact your personal physician following an 

emergency department visit to inform them of the circumstance of the visit and 

for follow-up with them and/or the need for any referrals to a consulting 

specialist.





The emergency department will also refer you to a specialist when appropriate. 

This referral assures that you have the opportunity for follow-up care with a 

specialist. All of these measure are taken in an effort to provide you with 

optimal care, which includes your follow-up.





Under all circumstances we always encourage you to contact your private 

physician who remains a resource for coordinating your care. When calling for 

follow-up care, please make the office aware that this follow-up is from your 

recent emergency room visit. If for any reason you are refused follow-up, please

contact the [facility] Emergency Department

at [phone] and asked to speak to the emergency department charge nurse.





Please follow up with your primary care physician. If you do not have a primary 

care physician, see below:


[facility]


[address] [address]


[phone]





[facility]


[address] [place] [address]


[phone]








[facility]


[address] [place] [address]


Phone: [phone]


Fax: [phone]








Sepsis Event Note (ED)





- Evaluation


Sepsis Screening Result: No Definite Risk





- Focused Exam


Vital Signs: 


                                   Vital Signs











  Temp Pulse Resp BP Pulse Ox


 


 01/30/21 15:57   115 H  18  152/93 H  97


 


 01/30/21 15:38  98.9 F  120 H  18  184/116 H  93 L














- My Orders


Last 24 Hours: 


My Active Orders





01/30/21 15:36


EKG Documentation Completion [RC] STAT 


Sodium Chloride 0.9% [Saline Flush]   10 ml FLUSH ASDIRECTED PRN 


Sodium Chloride 0.9% [Saline Flush]   2.5 ml FLUSH ASDIRECTED PRN 


Saline Lock Insert [OM.PC] Stat 





01/30/21 16:40


Magnesium Sulfate/Water [Magnesium Sulfate in Water 2 GM/50 ML] 2 gm   Premix 

Bag 1 bag IV ONETIME 


Sodium Chloride 0.9% [Normal Saline] 1,000 ml IV .Bolus 














- Assessment/Plan


Last 24 Hours: 


My Active Orders





01/30/21 15:36


EKG Documentation Completion [RC] STAT 


Sodium Chloride 0.9% [Saline Flush]   10 ml FLUSH ASDIRECTED PRN 


Sodium Chloride 0.9% [Saline Flush]   2.5 ml FLUSH ASDIRECTED PRN 


Saline Lock Insert [OM.PC] Stat 





01/30/21 16:40


Magnesium Sulfate/Water [Magnesium Sulfate in Water 2 GM/50 ML] 2 gm   Premix 

Bag 1 bag IV ONETIME 


Sodium Chloride 0.9% [Normal Saline] 1,000 ml IV .Bolus

## 2021-01-30 NOTE — CT
Indication:



Persistent seizure.



Technique:



Multiple axial images were obtained from the skullbase to the vertex 

without intravenous contrast enhancement.



Please note that all CT scans at this facility use dose modulation, 

iterative reconstruction, and/or weight-based dosing when appropriate to 

reduce radiation dose to as low as reasonably achievable. 



Comparison:



None



Findings:



The ventricles are symmetric and normal in size and morphology. The basal 

cisterns are widely patent. No intra-axial or extra-axial hemorrhage is 

identified. No mass, mass effect or midline shift is seen. The bony 

calvarium is intact. Significant mucosal thickening is identified within 

the right maxillary sinus. The remainder of the visualized paranasal 

sinuses and mastoid air cells are clear.



Impression:



No acute intracranial process.



Right maxillary sinus disease



Please note that all CT scans at this facility use dose modulation, 

iterative reconstruction, and/or weight-based dosing when appropriate to 

reduce radiation dose to as low as reasonably achievable.



Dictated by Lili Lewis MD @ Jan 30 2021  4:29PM



Signed by Dr. Lili Lewis @ Jan 30 2021  4:38PM

## 2021-04-09 ENCOUNTER — HOSPITAL ENCOUNTER (EMERGENCY)
Dept: HOSPITAL 56 - MW.ED | Age: 36
Discharge: HOME | End: 2021-04-09
Payer: SELF-PAY

## 2021-04-09 VITALS — SYSTOLIC BLOOD PRESSURE: 131 MMHG | DIASTOLIC BLOOD PRESSURE: 100 MMHG | HEART RATE: 99 BPM

## 2021-04-09 DIAGNOSIS — J45.909: ICD-10-CM

## 2021-04-09 DIAGNOSIS — I10: ICD-10-CM

## 2021-04-09 DIAGNOSIS — Z20.822: ICD-10-CM
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
## 2021-04-09 DIAGNOSIS — F10.230: ICD-10-CM

## 2021-04-09 DIAGNOSIS — J18.9: Primary | ICD-10-CM

## 2021-04-09 LAB
BUN SERPL-MCNC: 5 MG/DL (ref 7–18)
CHLORIDE SERPL-SCNC: 101 MMOL/L (ref 98–107)
CO2 SERPL-SCNC: 27.6 MMOL/L (ref 21–32)
GLUCOSE SERPL-MCNC: 114 MG/DL (ref 74–106)
POTASSIUM SERPL-SCNC: 3.7 MMOL/L (ref 3.5–5.1)
SODIUM SERPL-SCNC: 141 MMOL/L (ref 136–145)

## 2021-04-09 PROCEDURE — 83735 ASSAY OF MAGNESIUM: CPT

## 2021-04-09 PROCEDURE — 80307 DRUG TEST PRSMV CHEM ANLYZR: CPT

## 2021-04-09 PROCEDURE — 80053 COMPREHEN METABOLIC PANEL: CPT

## 2021-04-09 PROCEDURE — 96374 THER/PROPH/DIAG INJ IV PUSH: CPT

## 2021-04-09 PROCEDURE — 71045 X-RAY EXAM CHEST 1 VIEW: CPT

## 2021-04-09 PROCEDURE — 87635 SARS-COV-2 COVID-19 AMP PRB: CPT

## 2021-04-09 PROCEDURE — 85025 COMPLETE CBC W/AUTO DIFF WBC: CPT

## 2021-04-09 PROCEDURE — 36415 COLL VENOUS BLD VENIPUNCTURE: CPT

## 2021-04-09 PROCEDURE — 99285 EMERGENCY DEPT VISIT HI MDM: CPT

## 2021-04-09 PROCEDURE — 84484 ASSAY OF TROPONIN QUANT: CPT

## 2021-04-09 PROCEDURE — 82962 GLUCOSE BLOOD TEST: CPT

## 2021-04-09 PROCEDURE — 96375 TX/PRO/DX INJ NEW DRUG ADDON: CPT

## 2021-04-09 PROCEDURE — U0002 COVID-19 LAB TEST NON-CDC: HCPCS

## 2021-04-09 PROCEDURE — 93005 ELECTROCARDIOGRAM TRACING: CPT

## 2021-04-09 NOTE — EDM.PDOC
ED HPI GENERAL MEDICAL PROBLEM





- General


Stated Complaint: EMS ARRIVAL


Time Seen by Provider: 04/09/21 15:09


Source of Information: Reports: Patient


History Limitations: Reports: No Limitations





- History of Present Illness


INITIAL COMMENTS - FREE TEXT/NARRATIVE: 





36-year-old female past medical history hypertension, anxiety, alcoholism with 

last drink this morning, asthma presents for shortness of breath.  Patient 

states that symptoms have been going on for the last few days and worsening 

since last night.  She notes some chest pain when she is coughing.  She also 

notes some midepigastric pain when she is coughing.  She feels nauseated without

vomiting.  She is uncertain about fevers.  She notes productive cough.  She 

feels short of breath.  She notes that she was trying to quit alcohol but for 

the last week has been drinking roughly 1/5 per day with last drink this 

morning.


  ** headache


Pain Score (Numeric/FACES): 6





- Related Data


                                    Allergies











Allergy/AdvReac Type Severity Reaction Status Date / Time


 


No Known Allergies Allergy   Verified 04/09/21 16:49











Home Meds: 


                                    Home Meds





Azithromycin [Zithromax] 250 mg PO ASDIRECTED #6 tablet 04/09/21 [Rx]


chlordiazePOXIDE [Librium] 25 mg PO ASDIRECTED #15 cap 04/09/21 [Rx]











Past Medical History





- Past Health History


Medical/Surgical History: Denies Medical/Surgical History


HEENT History: Reports: Other (See Below)


Other HEENT History: Ulcers in both eyes, previous abcess in R side of mouth, 

glasses


Cardiovascular History: Reports: Hypertension


Respiratory History: Reports: Asthma


Gastrointestinal History: Reports: None


Other Gastrointestinal History: coloitis


Genitourinary History: Reports: None


OB/GYN History: Reports: Other (See Below)


Other OB/GYN History: migraines when menstruating


Musculoskeletal History: Reports: None


Neurological History: Reports: None


Psychiatric History: Reports: Addiction, Anxiety, Depression, Panic Attack, 

Suicide Attempt, Suicidal Ideation


Other Psychiatric History: ETOH addiction.  has been to rehab twice, quit on own

 as well


Endocrine/Metabolic History: Reports: None


Insulin Pump Model and : N/A


Hematologic History: Reports: None


Immunologic History: Reports: None


Oncologic (Cancer) History: Reports: None


Dermatologic History: Reports: None





- Infectious Disease History


Infectious Disease History: Reports: Chicken Pox





- Past Surgical History


Head Surgeries/Procedures: Reports: None


HEENT Surgical History: Reports: None


Cardiovascular Surgical History: Reports: None


Respiratory Surgical History: Reports: None


GI Surgical History: Reports: None


Endocrine Surgical History: Reports: None


Neurological Surgical History: Reports: None


Musculoskeletal Surgical History: Reports: None


Dermatological Surgical History: Reports: None





Social & Family History





- Family History


Family Medical History: No Pertinent Family History


HEENT: Reports: None


Cardiac: Reports: None


Endocrine/Metabolic: Reports: None, Diabetes, Type I





- Caffeine Use


Caffeine Use: Reports: None


Other Caffeine Use: rarely





ED ROS GENERAL





- Review of Systems


Review Of Systems: Comprehensive ROS is negative, except as noted in HPI.





ED EXAM, GENERAL





- Physical Exam


Exam: See Below


Exam Limited By: No Limitations


General Appearance: Alert, WD/WN, No Apparent Distress, Anxious, Other (tremors)


Ears: Hearing Grossly Normal


Throat/Mouth: Normal Voice, No Airway Compromise, Other (tongue fasiculations )


Head: Atraumatic, Normocephalic


Neck: Normal Inspection


Respiratory/Chest: No Respiratory Distress, Lungs Clear, Normal Breath Sounds, 

No Accessory Muscle Use


Cardiovascular: Normal Peripheral Pulses, Regular Rate, Rhythm


GI/Abdominal: Soft, Non-Tender


Extremities: Normal Inspection


Neurological: Alert, Normal Cognition, Normal Gait


Psychiatric: Normal Affect, Normal Mood, Anxious


Skin Exam: Warm, Dry, Intact, Normal Color


  ** #1 Interpretation


EKG Date: 04/09/21


Time: 15:42


Rhythm: NSR


Rate (Beats/Min): 111


P-Wave: Present


QRS: Normal


ST-T: Normal


QT: Normal


KS/PQ Interval: 188


EKG Interpretation Comments: 





sinus tachycardia





Course





- Vital Signs


Last Recorded V/S: 


                                Last Vital Signs











Temp  97 F   04/09/21 15:08


 


Pulse  113 H  04/09/21 16:30


 


Resp  17   04/09/21 16:30


 


BP  130/100 H  04/09/21 16:30


 


Pulse Ox  94 L  04/09/21 16:30














- Orders/Labs/Meds


Orders: 


                               Active Orders 24 hr











 Category Date Time Status


 


 Cardiac Monitoring [RC] .AS DIRECTED Care  04/09/21 15:14 Active


 


 EKG Documentation Completion [RC] STAT Care  04/09/21 15:14 Active


 


 Pulse Oximetry [RC] ASDIRECTED Care  04/09/21 15:14 Active


 


 Sodium Chloride 0.9% [Saline Flush] Med  04/09/21 15:14 Active





 10 ml FLUSH ASDIRECTED PRN   


 


 Sodium Chloride 0.9% [Saline Flush] Med  04/09/21 15:14 Active





 2.5 ml FLUSH ASDIRECTED PRN   


 


 Saline Lock Insert [OM.PC] Stat Oth  04/09/21 15:14 Ordered








                                Medication Orders





Sodium Chloride (Sodium Chloride 0.9% 10 Ml Syringe)  10 ml FLUSH ASDIRECTED PRN


   PRN Reason: Keep Vein Open


   Last Admin: 04/09/21 15:34  Dose: 10 ml


   Documented by: ELISABETH


Sodium Chloride (Sodium Chloride 0.9% 2.5 Ml Syringe)  2.5 ml FLUSH ASDIRECTED 

PRN


   PRN Reason: Keep Vein Open


   Last Admin: 04/09/21 15:34  Dose: 2.5 ml


   Documented by: ELISABETH








Labs: 


                                Laboratory Tests











  04/09/21 04/09/21 04/09/21 Range/Units





  15:15 15:23 15:50 


 


WBC     (4.0-11.0)  K/uL


 


RBC     (4.30-5.90)  M/uL


 


Hgb     (12.0-16.0)  g/dL


 


Hct     (36.0-46.0)  %


 


MCV     (80.0-98.0)  fL


 


MCH     (27.0-32.0)  pg


 


MCHC     (31.0-37.0)  g/dL


 


RDW Std Deviation     (28.0-62.0)  fl


 


RDW Coeff of Flo     (11.0-15.0)  %


 


Plt Count     (150-400)  K/uL


 


MPV     (7.40-12.00)  fL


 


Neut % (Auto)     (48.0-80.0)  %


 


Lymph % (Auto)     (16.0-40.0)  %


 


Mono % (Auto)     (0.0-15.0)  %


 


Eos % (Auto)     (0.0-7.0)  %


 


Baso % (Auto)     (0.0-1.5)  %


 


Neut # (Auto)     (1.4-5.7)  K/uL


 


Lymph # (Auto)     (0.6-2.4)  K/uL


 


Mono # (Auto)     (0.0-0.8)  K/uL


 


Eos # (Auto)     (0.0-0.7)  K/uL


 


Baso # (Auto)     (0.0-0.1)  K/uL


 


Nucleated RBC %     /100WBC


 


Nucleated RBCs #     K/uL


 


Sodium  141    (136-145)  mmol/L


 


Potassium  3.7    (3.5-5.1)  mmol/L


 


Chloride  101    ()  mmol/L


 


Carbon Dioxide  27.6    (21.0-32.0)  mmol/L


 


BUN  5 L    (7.0-18.0)  mg/dL


 


Creatinine  0.6    (0.6-1.0)  mg/dL


 


Est Cr Clr Drug Dosing  TNP    


 


Estimated GFR (MDRD)  > 60.0    ml/min


 


Glucose  114 H    ()  mg/dL


 


POC Glucose   126 H   ()  mg/dL


 


Calcium  9.0    (8.5-10.1)  mg/dL


 


Magnesium  1.3 L    (1.8-2.4)  mg/dL


 


Total Bilirubin  1.0    (0.2-1.0)  mg/dL


 


AST  211 H    (15-37)  IU/L


 


ALT  113 H    (14-63)  IU/L


 


Alkaline Phosphatase  86    ()  U/L


 


Troponin I  < 0.050    (0.000-0.056)  ng/mL


 


Total Protein  7.7    (6.4-8.2)  g/dL


 


Albumin  3.0 L    (3.4-5.0)  g/dL


 


Globulin  4.7 H    (2.6-4.0)  g/dL


 


Albumin/Globulin Ratio  0.6 L    (0.9-1.6)  


 


Ethyl Alcohol  138    mg/dL


 


SARS-CoV-2 RNA (MIGUELANGEL)    NEGATIVE  (NEGATIVE)  














  04/09/21 Range/Units





  15:59 


 


WBC  7.10  (4.0-11.0)  K/uL


 


RBC  4.26 L  (4.30-5.90)  M/uL


 


Hgb  13.4  (12.0-16.0)  g/dL


 


Hct  39.8  (36.0-46.0)  %


 


MCV  93.4  (80.0-98.0)  fL


 


MCH  31.5  (27.0-32.0)  pg


 


MCHC  33.7  (31.0-37.0)  g/dL


 


RDW Std Deviation  63.6 H  (28.0-62.0)  fl


 


RDW Coeff of Flo  19 H  (11.0-15.0)  %


 


Plt Count  74 L  (150-400)  K/uL


 


MPV  10.90  (7.40-12.00)  fL


 


Neut % (Auto)  71.2  (48.0-80.0)  %


 


Lymph % (Auto)  20.7  (16.0-40.0)  %


 


Mono % (Auto)  5.9  (0.0-15.0)  %


 


Eos % (Auto)  2.1  (0.0-7.0)  %


 


Baso % (Auto)  0.1  (0.0-1.5)  %


 


Neut # (Auto)  5.1  (1.4-5.7)  K/uL


 


Lymph # (Auto)  1.5  (0.6-2.4)  K/uL


 


Mono # (Auto)  0.4  (0.0-0.8)  K/uL


 


Eos # (Auto)  0.2  (0.0-0.7)  K/uL


 


Baso # (Auto)  0.0  (0.0-0.1)  K/uL


 


Nucleated RBC %  0.0  /100WBC


 


Nucleated RBCs #  0  K/uL


 


Sodium   (136-145)  mmol/L


 


Potassium   (3.5-5.1)  mmol/L


 


Chloride   ()  mmol/L


 


Carbon Dioxide   (21.0-32.0)  mmol/L


 


BUN   (7.0-18.0)  mg/dL


 


Creatinine   (0.6-1.0)  mg/dL


 


Est Cr Clr Drug Dosing   


 


Estimated GFR (MDRD)   ml/min


 


Glucose   ()  mg/dL


 


POC Glucose   ()  mg/dL


 


Calcium   (8.5-10.1)  mg/dL


 


Magnesium   (1.8-2.4)  mg/dL


 


Total Bilirubin   (0.2-1.0)  mg/dL


 


AST   (15-37)  IU/L


 


ALT   (14-63)  IU/L


 


Alkaline Phosphatase   ()  U/L


 


Troponin I   (0.000-0.056)  ng/mL


 


Total Protein   (6.4-8.2)  g/dL


 


Albumin   (3.4-5.0)  g/dL


 


Globulin   (2.6-4.0)  g/dL


 


Albumin/Globulin Ratio   (0.9-1.6)  


 


Ethyl Alcohol   mg/dL


 


SARS-CoV-2 RNA (MIGUELANGEL)   (NEGATIVE)  











Meds: 


Medications











Generic Name Dose Route Start Last Admin





  Trade Name Freq  PRN Reason Stop Dose Admin


 


Sodium Chloride  10 ml  04/09/21 15:14  04/09/21 15:34





  Sodium Chloride 0.9% 10 Ml Syringe  FLUSH   10 ml





  ASDIRECTED PRN   Administration





  Keep Vein Open  


 


Sodium Chloride  2.5 ml  04/09/21 15:14  04/09/21 15:34





  Sodium Chloride 0.9% 2.5 Ml Syringe  FLUSH   2.5 ml





  ASDIRECTED PRN   Administration





  Keep Vein Open  














Discontinued Medications














Generic Name Dose Route Start Last Admin





  Trade Name Moni  PRN Reason Stop Dose Admin


 


Chlordiazepoxide HCl  75 mg  04/09/21 15:15  04/09/21 15:33





  Chlordiazepoxide 25 Mg Cap  PO  04/09/21 15:16  75 mg





  ONETIME ONE   Administration


 


Sodium Chloride  1,000 mls @ 999 mls/hr  04/09/21 15:14  04/09/21 15:17





  Normal Saline  IV  04/09/21 16:14  999 mls/hr





  .Bolus ONE   Administration


 


Sodium Chloride  1,000 mls @ 999 mls/hr  04/09/21 15:16  04/09/21 15:36





  Normal Saline  IV  04/09/21 16:16  999 mls/hr





  .Bolus ONE   Administration


 


Lorazepam  1 mg  04/09/21 15:15  04/09/21 15:33





  Lorazepam 2 Mg/Ml Sdv  IVPUSH  04/09/21 15:16  1 mg





  ONETIME ONE   Administration


 


Magnesium Oxide  800 mg  04/09/21 17:00  04/09/21 16:54





  Magnesium Oxide 400 Mg Tab  PO  04/09/21 17:01  800 mg





  ONETIME ONE   Administration


 


Ondansetron HCl  4 mg  04/09/21 15:15  04/09/21 15:32





  Ondansetron 4 Mg/2 Ml Sdv  IVPUSH  04/09/21 15:16  4 mg





  ONETIME ONE   Administration














- Re-Assessments/Exams


Free Text/Narrative Re-Assessment/Exam: 





04/09/21 15:17


Patient's presentation is consistent with alcohol withdrawal.  May also have an 

underlying pneumonia or Covid infection.  Will get labs to assess.  Will give 

Ativan 1 mg, Librium 75 mg, 2 L IV fluid bolus, Zofran for symptomatic relief 

while working up.


04/09/21 15:19


CIWA 7


04/09/21 16:25


Labs remarkable for alcohol level of 148.  Magnesium is a little low, 

replacement is ordered.  We will follow up x-ray and Covid testing and 

disposition accordingly.


04/09/21 17:22


Patient symptoms much improved.  Heart rate is better.  Will discharge with 

azithromycin for potential missed pneumonia on chest x-ray considering patient's

 symptoms.  Will discharge with short prescription for Librium.  I had a very 

long discussion about the dangers of combining Librium with drinking and advised

 her that if she does continue to drink alcohol to discontinue the Librium.  She

 understands.





Departure





- Departure


Time of Disposition: 17:22


Disposition: Home, Self-Care 01


Condition: Good


Clinical Impression: 


Pneumonia


Qualifiers:


 Pneumonia type: due to unspecified organism Laterality: unspecified laterality 





Alcohol withdrawal


Qualifiers:


 Complication of substance-induced condition: uncomplicated Qualified Code(s): 

F10.230 - Alcohol dependence with withdrawal, uncomplicated








- Discharge Information


Prescriptions: 


chlordiazePOXIDE [Librium] 25 mg PO ASDIRECTED #15 cap


Azithromycin [Zithromax] 250 mg PO ASDIRECTED #6 tablet


Instructions:  Alcohol Withdrawal Syndrome, Easy-to-Read, Community-Acquired 

Pneumonia, Adult, Easy-to-Read


Additional Instructions: 


The following information is given to patients seen in the emergency department 

who are being discharged to home. This information is to outline your options 

for follow-up care. We provide all patients seen in our emergency department 

with a follow-up referral.





The need for follow-up, as well as the timing and circumstances, are variable 

depending upon the specifics of your emergency department visit.





If you don't have a primary care physician on staff, we will provide you with a 

referral. We always advise you to contact your personal physician following an 

emergency department visit to inform them of the circumstance of the visit and 

for follow-up with them and/or the need for any referrals to a consulting 

specialist.





The emergency department will also refer you to a specialist when appropriate. 

This referral assures that you have the opportunity for follow-up care with a 

specialist. All of these measure are taken in an effort to provide you with 

optimal care, which includes your follow-up.





Under all circumstances we always encourage you to contact your private 

physician who remains a resource for coordinating your care. When calling for 

follow-up care, please make the office aware that this follow-up is from your 

recent emergency room visit. If for any reason you are refused follow-up, please

 contact the [facility] Emergency 

Department at [phone] and asked to speak to the emergency department 

charge nurse.





Please follow up with your primary care physician. If you do not have a primary 

care physician, see below:


[facility]


[address] [address]


[phone]





[facility]


[address], [place] [id number] [phone]








[facility]


[address] [place] [address]


Phone: [phone]


Fax: [phone]








Sepsis Event Note (ED)





- Focused Exam


Vital Signs: 


                                   Vital Signs











  Temp Pulse Resp BP Pulse Ox


 


 04/09/21 16:30   113 H  17  130/100 H  94 L


 


 04/09/21 15:08  97 F  126 H  18  147/100 H  97














- My Orders


Last 24 Hours: 


My Active Orders





04/09/21 15:14


Cardiac Monitoring [RC] .AS DIRECTED 


EKG Documentation Completion [RC] STAT 


Pulse Oximetry [RC] ASDIRECTED 


Sodium Chloride 0.9% [Saline Flush]   10 ml FLUSH ASDIRECTED PRN 


Sodium Chloride 0.9% [Saline Flush]   2.5 ml FLUSH ASDIRECTED PRN 


Saline Lock Insert [OM.PC] Stat 














- Assessment/Plan


Last 24 Hours: 


My Active Orders





04/09/21 15:14


Cardiac Monitoring [RC] .AS DIRECTED 


EKG Documentation Completion [RC] STAT 


Pulse Oximetry [RC] ASDIRECTED 


Sodium Chloride 0.9% [Saline Flush]   10 ml FLUSH ASDIRECTED PRN 


Sodium Chloride 0.9% [Saline Flush]   2.5 ml FLUSH ASDIRECTED PRN 


Saline Lock Insert [OM.PC] Stat

## 2021-04-09 NOTE — CR
Indication:



Painful cough



Technique:



Chest 1 view



Comparison:



January 25, 2021



Findings/Impression:



Normal cardiomediastinal silhouette. 



Two opacity in the left mid lung concerning for infection. The right lung 

appears clear. There is no effusion or pneumothorax. Osseous structures are 

intact.



Dictated by Mariana Mckoy MD @ Apr 9 2021  4:28PM



Signed by Dr. Mariana Mckoy @ Apr 9 2021  4:28PM

## 2021-07-29 ENCOUNTER — HOSPITAL ENCOUNTER (EMERGENCY)
Dept: HOSPITAL 56 - MW.ED | Age: 36
Discharge: LEFT BEFORE DISCHARGE | End: 2021-07-29
Payer: SELF-PAY

## 2021-07-29 VITALS — HEART RATE: 89 BPM | DIASTOLIC BLOOD PRESSURE: 87 MMHG | SYSTOLIC BLOOD PRESSURE: 137 MMHG

## 2021-07-29 DIAGNOSIS — R07.9: Primary | ICD-10-CM

## 2021-07-29 DIAGNOSIS — F10.230: ICD-10-CM

## 2021-07-29 DIAGNOSIS — F41.9: ICD-10-CM

## 2021-07-29 DIAGNOSIS — F17.200: ICD-10-CM

## 2021-07-29 PROCEDURE — 71046 X-RAY EXAM CHEST 2 VIEWS: CPT

## 2021-07-29 PROCEDURE — 99285 EMERGENCY DEPT VISIT HI MDM: CPT

## 2021-07-29 PROCEDURE — 93005 ELECTROCARDIOGRAM TRACING: CPT

## 2021-07-29 NOTE — EDM.PDOC
ED HPI GENERAL MEDICAL PROBLEM





- General


Chief Complaint: Respiratory Problem


Stated Complaint: ALCOHOL WITHDRAWAL


Time Seen by Provider: 07/29/21 03:10


Source of Information: Reports: Patient, EMS





- History of Present Illness


INITIAL COMMENTS - FREE TEXT/NARRATIVE: 





History of present illness:


36-year-old female brought by EMS for anxiety and reported difficulty breathing 

as well as concern for withdrawal. Symptoms started just PTA. The patient 

reports she drinks alcohol, however, stopped drinking at 11 PM.  She is feeling 

anxious.  She is reporting some chest discomfort which is been ongoing for at 

least 1 week.  She reports would like to be tested for COVID and pneumonia.





Review of systems: 


As per history of present illness and below otherwise all systems reviewed and 

negative.





Past medical history: 


As per history of present illness and as reviewed below otherwise 

noncontributory.





Surgical history: 


As per history of present illness and as reviewed below otherwise 

noncontributory.





Social history: 


Daily smoker.  Daily alcohol use





Family history: 


As per history of present illness and as reviewed below otherwise 

noncontributory.





Physical exam:


GEN: no acute distress, well appearing


HEENT: Atraumatic, normocephalic, mucous membranes moist, 


Neck: supple, nontender, trachea midline.


Lungs: No respiratory distress.


Heart: RRR


Abdomen: nondistended. 


Back: nontender


Extremities: Atraumatic. Neurovascularly intact.


Neuro: Awake, alert, oriented. Neuro Exam nonfocal.


Psych: Appears anxious


Skin: warm, dry, no lesions





Diagnostics:


Chest x-ray, EKG





EKG performed July 29, 2021 at 3:06 AM, sinus rhythm at a rate of 89, 

nonspecific ST changes likely normal early repolarization, no acute ischemia, no

STEMI.  Interpreted by me.








Chest radiograph 2 views





COMPARISON: 4/9/2021





FINDINGS:





Mediastinum: The mediastinum is normal in appearance. The heart silhouette is 

normal in size and morphology.





Lung: Both lungs are unremarkable in appearance. No sign of pleural effusion s

een. No pneumothorax is identified.





Bone and Soft tissue: Unremarkable for age.





IMPRESSION:


1. No acute cardiopulmonary disease is seen.








Therapeutics:


ativan





MDM: 


Low risk for CAD or PE. She is an alcohol drinker. She was given dose of Ativan 

with improvement in symptoms. EKG with no acute ischemia. Chest x-ray negative 

for pneumonia. No other acute abnormal findings. Stable for discharge.





Impression: 


Anxiety, nonspecific chest pain





Plan:


[]





Definitive disposition and diagnosis as appropriate pending reevaluation and 

review of above.











- Related Data


                                    Allergies











Allergy/AdvReac Type Severity Reaction Status Date / Time


 


No Known Allergies Allergy   Verified 04/09/21 16:49











Home Meds: 


                                    Home Meds





. [No Known Home Meds]  07/29/21 [History]











Past Medical History





- Past Health History


Medical/Surgical History: Denies Medical/Surgical History


HEENT History: Reports: Other (See Below)


Other HEENT History: Ulcers in both eyes, previous abcess in R side of mouth, 

glasses


Cardiovascular History: Reports: Hypertension


Respiratory History: Reports: Asthma


Gastrointestinal History: Reports: None


Other Gastrointestinal History: coloitis


Genitourinary History: Reports: None


OB/GYN History: Reports: Other (See Below)


Other OB/GYN History: migraines when menstruating


Musculoskeletal History: Reports: None


Neurological History: Reports: None


Psychiatric History: Reports: Addiction, Anxiety, Depression, Panic Attack, 

Suicide Attempt, Suicidal Ideation


Other Psychiatric History: ETOH addiction.  has been to rehab twice, quit on own

 as well


Endocrine/Metabolic History: Reports: None


Insulin Pump Model and : N/A


Hematologic History: Reports: None


Immunologic History: Reports: None


Oncologic (Cancer) History: Reports: None


Dermatologic History: Reports: None





- Infectious Disease History


Infectious Disease History: Reports: Chicken Pox





- Past Surgical History


Head Surgeries/Procedures: Reports: None


HEENT Surgical History: Reports: None


Cardiovascular Surgical History: Reports: None


Respiratory Surgical History: Reports: None


GI Surgical History: Reports: None


Endocrine Surgical History: Reports: None


Neurological Surgical History: Reports: None


Musculoskeletal Surgical History: Reports: None


Dermatological Surgical History: Reports: None





Social & Family History





- Family History


Family Medical History: No Pertinent Family History


HEENT: Reports: None


Cardiac: Reports: None


Endocrine/Metabolic: Reports: None, Diabetes, Type I





- Caffeine Use


Caffeine Use: Reports: Coffee


Other Caffeine Use: rarely





- Recreational Drug Use


Recreational Drug Use: No





ED ROS GENERAL





- Review of Systems


Review Of Systems: See Below (See dictation)





ED EXAM, GENERAL





- Physical Exam


Exam: See Below (See dictation)





Course





- Vital Signs


Text/Narrative:: 





Low risk for coronary artery disease.  History of alcohol abuse.  No acute 

ischemia on EKG.  Patient with no signs of Covid at this time.  Defer Covid 

testing, however will check x-ray.


Last Recorded V/S: 


                                Last Vital Signs











Temp  97.9 F   07/29/21 03:08


 


Pulse  89   07/29/21 03:08


 


Resp  18   07/29/21 03:08


 


BP  137/87   07/29/21 03:08


 


Pulse Ox  98   07/29/21 03:08














- Orders/Labs/Meds


Meds: 


Medications














Discontinued Medications














Generic Name Dose Route Start Last Admin





  Trade Name Moni  PRN Reason Stop Dose Admin


 


Lorazepam  1 mg  07/29/21 03:10  07/29/21 03:19





  Lorazepam 1 Mg Tab  PO  07/29/21 03:11  1 mg





  ONETIME ONE   Administration














- Re-Assessments/Exams


Free Text/Narrative Re-Assessment/Exam: 





07/29/21 04:10


I went back to the patient's room to reassess the patient and discuss results 

here today. The patient was not present in the bed and all hospital equipment 

was left behind. Patient did not have an IV. She did not communicate departure 

to me or the nursing staff. I asked in the bathrooms, patient not present there 

either. Presumed that the patient eloped.











Departure





- Departure


Time of Disposition: 04:10


Disposition: Eloped 07


Clinical Impression: 


 Acute nonspecific chest pain with low risk of coronary artery disease, Alcohol 

use, Anxiety





Alcohol withdrawal


Qualifiers:


 Complication of substance-induced condition: uncomplicated Qualified Code(s): 

F10.230 - Alcohol dependence with withdrawal, uncomplicated








- Discharge Information


Instructions:  Steps to Quit Smoking, Easy-to-Read, Shortness of Breath, Adult, 

Easy-to-Read, Smoking Tobacco Information, Adult, Nonspecific Chest Pain, Adult,

 Easy-to-Read, Hypertension, Adult, Easy-to-Read, Preventing Hypertension, 

Managing Anxiety, Adult


Referrals: 


PCP,None [Primary Care Provider] - 


Forms:  ED Department Discharge


Additional Instructions: 


The following information is given to patients seen in the emergency department 

who are being discharged to home. This information is to outline your options 

for follow-up care. We provide all patients seen in our emergency department 

with a follow-up referral.





The need for follow-up, as well as the timing and circumstances, are variable 

depending upon the specifics of your emergency department visit.





If you don't have a primary care physician on staff, we will provide you with a 

referral. We always advise you to contact your personal physician following an 

emergency department visit to inform them of the circumstance of the visit and 

for follow-up with them and/or the need for any referrals to a consulting 

specialist.





The emergency department will also refer you to a specialist when appropriate. 

This referral assures that you have the opportunity for follow-up care with a 

specialist. All of these measure are taken in an effort to provide you with 

optimal care, which includes your follow-up.





Under all circumstances we always encourage you to contact your private 

physician who remains a resource for coordinating your care. When calling for 

follow-up care, please make the office aware that this follow-up is from your 

recent emergency room visit. If for any reason you are refused follow-up, please

contact the [facility] Emergency Department

at [phone] and asked to speak to the emergency department charge nurse.





[facility] - [facility]


[address] [place] [id number]


Phone: [phone]


Fax: [phone]








[address] [address]


Phone: [phone]


Fax: [phone]














Sepsis Event Note (ED)





- Evaluation


Sepsis Screening Result: No Definite Risk





- Focused Exam


Vital Signs: 


                                   Vital Signs











  Temp Pulse Resp BP Pulse Ox


 


 07/29/21 03:08  97.9 F  89  18  137/87  98

## 2021-07-29 NOTE — CR
INDICATION: Chest pain



TECHNIQUE: Chest radiograph 2 views



COMPARISON: 4/9/2021



FINDINGS: 



Mediastinum: The mediastinum is normal in appearance. The heart silhouette 

is normal in size and morphology. 



Lung: Both lungs are unremarkable in appearance. No sign of pleural 

effusion seen. No pneumothorax is identified. 



Bone and Soft tissue: Unremarkable for age. 



IMPRESSION: 



1. No acute cardiopulmonary disease is seen. 



Dictated by: Harvey Moreau MD @ 07/29/2021 03:47:20



(Electronically Signed)

## 2021-11-27 ENCOUNTER — HOSPITAL ENCOUNTER (INPATIENT)
Dept: HOSPITAL 56 - MW.ED | Age: 36
LOS: 2 days | Discharge: HOME | DRG: 433 | End: 2021-11-29
Payer: MEDICAID

## 2021-11-27 DIAGNOSIS — F41.0: ICD-10-CM

## 2021-11-27 DIAGNOSIS — K51.90: ICD-10-CM

## 2021-11-27 DIAGNOSIS — K29.70: ICD-10-CM

## 2021-11-27 DIAGNOSIS — N39.0: ICD-10-CM

## 2021-11-27 DIAGNOSIS — J45.909: ICD-10-CM

## 2021-11-27 DIAGNOSIS — K52.9: ICD-10-CM

## 2021-11-27 DIAGNOSIS — Z20.822: ICD-10-CM

## 2021-11-27 DIAGNOSIS — F32.A: ICD-10-CM

## 2021-11-27 DIAGNOSIS — E83.42: ICD-10-CM

## 2021-11-27 DIAGNOSIS — F10.288: ICD-10-CM

## 2021-11-27 DIAGNOSIS — K70.10: Primary | ICD-10-CM

## 2021-11-27 DIAGNOSIS — F10.230: ICD-10-CM

## 2021-11-27 LAB
BUN SERPL-MCNC: 15 MG/DL (ref 7–18)
CHLORIDE SERPL-SCNC: 89 MMOL/L (ref 98–107)
CO2 SERPL-SCNC: 14 MMOL/L (ref 21–32)
GLUCOSE SERPL-MCNC: 159 MG/DL (ref 74–106)
LIPASE SERPL-CCNC: 254 U/L (ref 73–393)
POTASSIUM SERPL-SCNC: 4.2 MMOL/L (ref 3.5–5.1)
SODIUM SERPL-SCNC: 131 MMOL/L (ref 136–145)

## 2021-11-27 PROCEDURE — U0002 COVID-19 LAB TEST NON-CDC: HCPCS

## 2021-11-27 PROCEDURE — C9113 INJ PANTOPRAZOLE SODIUM, VIA: HCPCS

## 2021-11-27 RX ADMIN — METHYLPREDNISOLONE SODIUM SUCCINATE SCH MG: 40 INJECTION, POWDER, FOR SOLUTION INTRAMUSCULAR; INTRAVENOUS at 18:22

## 2021-11-27 RX ADMIN — PANTOPRAZOLE SODIUM SCH MG: 40 INJECTION, POWDER, FOR SOLUTION INTRAVENOUS at 18:25

## 2021-11-27 RX ADMIN — METRONIDAZOLE SCH MLS/HR: 500 SOLUTION INTRAVENOUS at 23:09

## 2021-11-27 NOTE — PCM.HP.2
<Pallavi Fong - Last Filed: 11/27/21 16:49>





H&P History of Present Illness





- General


Date of Service: 11/27/21


Admit Problem/Dx: 


                           Admission Diagnosis/Problem





Admission Diagnosis/Problem      Abdominal pain











- History of Present Illness


Initial Comments - Free Text/Narative: 


The patient is a 36-year-old  female, on day 1 of service, with a 

significant past medical history of hypertension, asthma, alcohol abuse, alcohol

withdrawal seizures and symptoms, gastritis, anxiety, depression, and ulcerative

colitis, who was admitted to the medical floor due to gastritis, alcohol 

withdrawal symptoms, inflammatory bowel disease flare up, and UTI. The patient 

explains that for the past 7 days she has been having increasing epigastric 

abdominal pain which is 6 out of 10 in intensity, sharp in nature, nonradiating,

and associated with 20+ episodes of nonbilious nonbloody vomiting especially p

rominent over the past 3 days. The patient feels as though she has heartburn and

a burning sensation that reaches up her chest. She is also shaky, anxious, and 

tremulousness for the past 2 days. She explains that her last drink was 3 days 

ago. In regards to the patient's social history she has been drinking for the 

past 10 years daily, with vodka being the primary drink of choice. She has also 

had 1 pack of cigarettes for the past 13 years, and occasional marijuana use. In

regards to her family history, alcoholism runs in both her mother and father's 

side. She has no known drug allergies. The patient does not have suprapubic 

discomfort but does explain that she has increased frequency and urgency. She 

had bloody diarrhea 2 weeks ago which has subsided and now she only has loose 

bowel movements. She has no other health concerns at this time.





On CBC, her white blood count is 9.56, hemoglobin is 14.6, hematocrit is 42.9, 

and platelet count is 122.


On CMP, her sodium is 131, potassium is 4.2, chloride is 89, carbon dioxide is 

14, BUN is 15, and creatinine is 1.0.


Her magnesium level is decreased at 1.7.


On CT of the abdomen, hepatomegaly and hepatic steatosis is seen, fat deposits 

in the colon are also seen representing inflammatory bowel disease


On urinalysis, positive nitrites were seen and trace amounts of leukocyte 

esterase





In the emergency department, the patient received a 1000 mL normal saline bolus,

ceftriaxone 1 g once, Ativan 2 mg IV push once, metronidazole once, morphine 4 

mg IV push once, Zofran 4 mg once, and had the tests above done including a CBC,

CMP, CT of the abdomen, and urinalysis.














  ** abd


Pain Score (Numeric/FACES): 5





- Related Data


Allergies/Adverse Reactions: 


                                    Allergies











Allergy/AdvReac Type Severity Reaction Status Date / Time


 


No Known Allergies Allergy   Verified 11/27/21 12:52











Home Medications: 


                                    Home Meds





. [No Known Home Meds]  07/29/21 [History]











Past Medical History





- Past Health History


Medical/Surgical History: Denies Medical/Surgical History


HEENT History: Reports: Other (See Below)


Other HEENT History: Ulcers in both eyes, previous abcess in R side of mouth, 

glasses


Cardiovascular History: Reports: Hypertension


Respiratory History: Reports: Asthma


Gastrointestinal History: Reports: Other (See Below)


Other Gastrointestinal History: colitis


Genitourinary History: Reports: None


OB/GYN History: Reports: Other (See Below)


Other OB/BYN History: migraines when menstruating


Musculoskeletal History: Reports: None


Neurological History: Reports: None


Psychiatric History: Reports: Addiction, Anxiety, Depression, Panic Attack, 

Suicide Attempt, Suicidal Ideation


Other Psychiatric History: ETOH addiction.  has been to rehab twice, quit on own

 as well


Endocrine/Metabolic History: Reports: None


Insulin Pump Model and : N/A


Hematologic History: Reports: None


Immunologic History: Reports: None


Oncologic (Cancer) History: Reports: None


Dermatologic History: Reports: None





- Infectious Disease History


Infectious Disease History: Reports: Chicken Pox





- Past Surgical History


Head Surgeries/Procedures: Reports: None


HEENT Surgical History: Reports: None


Cardiovascular Surgical History: Reports: None


Respiratory Surgical History: Reports: None


GI Surgical History: Reports: None


Endocrine Surgical History: Reports: None


Neurological Surgical History: Reports: None


Musculoskeletal Surgical History: Reports: None


Dermatological Surgical History: Reports: None





Social & Family History





- Family History


Family Medical History: No Pertinent Family History


HEENT: Reports: None


Cardiac: Reports: None


Endocrine/Metabolic: Reports: None, Diabetes, Type I





- Caffeine Use


Caffeine Use: Reports: Coffee


Other Caffeine Use: rarely





H&P Review of Systems





- Review of Systems:


Review Of Systems: See Below


General: Reports: Chills, Fatigue, Decreased Appetite.  Denies: Fever


HEENT: Denies: Headaches, Sore Throat


Pulmonary: Denies: Shortness of Breath, Wheezing, Cough


Cardiovascular: Denies: Chest Pain, Palpitations


Gastrointestinal: Reports: Abdominal Pain, Diarrhea, Nausea, Vomiting


Genitourinary: Reports: Dysuria, Frequency.  Denies: Burning


Neurological: Reports: Confusion, Dizziness, Tremors





Exam





- Exam


Exam: See Below





- Vital Signs


Vital Signs: 


                                Last Vital Signs











Temp  95.7 F L  11/27/21 12:52


 


Pulse  107 H  11/27/21 15:10


 


Resp  18   11/27/21 15:10


 


BP  115/68   11/27/21 15:10


 


Pulse Ox  97   11/27/21 15:10











Weight: 142 lb 10.225 oz





- Exam


General: Alert, Oriented, Cooperative


HEENT: EOMI, Scleral Icterus, Other (Dry mucous membranes)


Neck: Trachea Midline.  No: Lymphadenopathy


Lungs: Clear to Auscultation, Normal Respiratory Effort


Cardiovascular: Regular Rate, Regular Rhythm


GI/Abdominal Exam: Normal Bowel Sounds, Soft, Tender


Neurological: Cranial Nerves Intact, Other (Hand tremors while arms held out)


Neuro Extensive - Mental Status: Alert, Oriented x3


Psychiatric: Alert, Normal Mood, Anxious





- Patient Data


Lab Results Last 24 hrs: 


                         Laboratory Results - last 24 hr











  11/27/21 11/27/21 11/27/21 Range/Units





  13:00 13:00 13:00 


 


WBC  9.56    (4.0-11.0)  K/uL


 


RBC  4.07 L    (4.30-5.90)  M/uL


 


Hgb  14.6    (12.0-16.0)  g/dL


 


Hct  42.9    (36.0-46.0)  %


 


MCV  105.4 H    (80.0-98.0)  fL


 


MCH  35.9 H    (27.0-32.0)  pg


 


MCHC  34.0    (31.0-37.0)  g/dL


 


RDW Std Deviation  52.8    (28.0-62.0)  fl


 


RDW Coeff of Flo  14    (11.0-15.0)  %


 


Plt Count  122 L    (150-400)  K/uL


 


MPV  11.50    (7.40-12.00)  fL


 


Neut % (Auto)  82.7 H    (48.0-80.0)  %


 


Lymph % (Auto)  9.3 L    (16.0-40.0)  %


 


Mono % (Auto)  7.5    (0.0-15.0)  %


 


Eos % (Auto)  0.2    (0.0-7.0)  %


 


Baso % (Auto)  0.3    (0.0-1.5)  %


 


Neut # (Auto)  7.9 H    (1.4-5.7)  K/uL


 


Lymph # (Auto)  0.9    (0.6-2.4)  K/uL


 


Mono # (Auto)  0.7    (0.0-0.8)  K/uL


 


Eos # (Auto)  0.0    (0.0-0.7)  K/uL


 


Baso # (Auto)  0.0    (0.0-0.1)  K/uL


 


Nucleated RBC %  0.0    /100WBC


 


Nucleated RBCs #  0    K/uL


 


INR     


 


APTT     (18.6-31.3)  SEC


 


Sodium   131 L   (136-145)  mmol/L


 


Potassium   4.2   (3.5-5.1)  mmol/L


 


Chloride   89 L   ()  mmol/L


 


Carbon Dioxide   14.0 L   (21.0-32.0)  mmol/L


 


BUN   15   (7.0-18.0)  mg/dL


 


Creatinine   1.0   (0.6-1.0)  mg/dL


 


Est Cr Clr Drug Dosing   TNP   


 


Estimated GFR (MDRD)   > 60.0   ml/min


 


Glucose   159 H   ()  mg/dL


 


Lactic Acid    1.8  (0.4-2.0)  mmol/L


 


Calcium   12.2 H   (8.5-10.1)  mg/dL


 


Magnesium   1.7 L   (1.8-2.4)  mg/dL


 


Total Bilirubin   5.0 H   (0.2-1.0)  mg/dL


 


AST   614 H   (15-37)  IU/L


 


ALT   206 H   (14-63)  IU/L


 


Alkaline Phosphatase   214 H   ()  U/L


 


Total Protein   10.2 H   (6.4-8.2)  g/dL


 


Albumin   3.3 L   (3.4-5.0)  g/dL


 


Globulin   6.9 H   (2.6-4.0)  g/dL


 


Albumin/Globulin Ratio   0.5 L   (0.9-1.6)  


 


Lipase   254   ()  U/L


 


HCG, Qual     (NEG)  


 


Urine Color     


 


Urine Appearance     


 


Urine pH     (5.0-8.0)  


 


Ur Specific Gravity     (1.001-1.035)  


 


Urine Protein     (NEGATIVE)  mg/dL


 


Urine Glucose (UA)     (NEGATIVE)  mg/dL


 


Urine Ketones     (NEGATIVE)  mg/dL


 


Urine Occult Blood     (NEGATIVE)  


 


Urine Nitrite     (NEGATIVE)  


 


Urine Bilirubin     (NEGATIVE)  


 


Urine Urobilinogen     (<2.0)  EU/dL


 


Ur Leukocyte Esterase     (NEGATIVE)  


 


U Hyaline Cast (Auto)     (0-2/LPF)  


 


Urine RBC     (0-2/HPF)  


 


Urine WBC     (0-5/HPF)  


 


Ur Epithelial Cells     (NONE-FEW)  


 


Urine Bacteria     (NEGATIVE)  


 


Urine Trichomonas     (NEGATIVE)  


 


Ethyl Alcohol   <3   mg/dL


 


SARS-CoV-2 RNA (MIGUELANGEL)     (NEGATIVE)  














  11/27/21 11/27/21 11/27/21 Range/Units





  13:00 13:00 13:15 


 


WBC     (4.0-11.0)  K/uL


 


RBC     (4.30-5.90)  M/uL


 


Hgb     (12.0-16.0)  g/dL


 


Hct     (36.0-46.0)  %


 


MCV     (80.0-98.0)  fL


 


MCH     (27.0-32.0)  pg


 


MCHC     (31.0-37.0)  g/dL


 


RDW Std Deviation     (28.0-62.0)  fl


 


RDW Coeff of Flo     (11.0-15.0)  %


 


Plt Count     (150-400)  K/uL


 


MPV     (7.40-12.00)  fL


 


Neut % (Auto)     (48.0-80.0)  %


 


Lymph % (Auto)     (16.0-40.0)  %


 


Mono % (Auto)     (0.0-15.0)  %


 


Eos % (Auto)     (0.0-7.0)  %


 


Baso % (Auto)     (0.0-1.5)  %


 


Neut # (Auto)     (1.4-5.7)  K/uL


 


Lymph # (Auto)     (0.6-2.4)  K/uL


 


Mono # (Auto)     (0.0-0.8)  K/uL


 


Eos # (Auto)     (0.0-0.7)  K/uL


 


Baso # (Auto)     (0.0-0.1)  K/uL


 


Nucleated RBC %     /100WBC


 


Nucleated RBCs #     K/uL


 


INR  1.11    


 


APTT  24.3    (18.6-31.3)  SEC


 


Sodium     (136-145)  mmol/L


 


Potassium     (3.5-5.1)  mmol/L


 


Chloride     ()  mmol/L


 


Carbon Dioxide     (21.0-32.0)  mmol/L


 


BUN     (7.0-18.0)  mg/dL


 


Creatinine     (0.6-1.0)  mg/dL


 


Est Cr Clr Drug Dosing     


 


Estimated GFR (MDRD)     ml/min


 


Glucose     ()  mg/dL


 


Lactic Acid     (0.4-2.0)  mmol/L


 


Calcium     (8.5-10.1)  mg/dL


 


Magnesium     (1.8-2.4)  mg/dL


 


Total Bilirubin     (0.2-1.0)  mg/dL


 


AST     (15-37)  IU/L


 


ALT     (14-63)  IU/L


 


Alkaline Phosphatase     ()  U/L


 


Total Protein     (6.4-8.2)  g/dL


 


Albumin     (3.4-5.0)  g/dL


 


Globulin     (2.6-4.0)  g/dL


 


Albumin/Globulin Ratio     (0.9-1.6)  


 


Lipase     ()  U/L


 


HCG, Qual   NEGATIVE   (NEG)  


 


Urine Color     


 


Urine Appearance     


 


Urine pH     (5.0-8.0)  


 


Ur Specific Gravity     (1.001-1.035)  


 


Urine Protein     (NEGATIVE)  mg/dL


 


Urine Glucose (UA)     (NEGATIVE)  mg/dL


 


Urine Ketones     (NEGATIVE)  mg/dL


 


Urine Occult Blood     (NEGATIVE)  


 


Urine Nitrite     (NEGATIVE)  


 


Urine Bilirubin     (NEGATIVE)  


 


Urine Urobilinogen     (<2.0)  EU/dL


 


Ur Leukocyte Esterase     (NEGATIVE)  


 


U Hyaline Cast (Auto)     (0-2/LPF)  


 


Urine RBC     (0-2/HPF)  


 


Urine WBC     (0-5/HPF)  


 


Ur Epithelial Cells     (NONE-FEW)  


 


Urine Bacteria     (NEGATIVE)  


 


Urine Trichomonas     (NEGATIVE)  


 


Ethyl Alcohol     mg/dL


 


SARS-CoV-2 RNA (MIGUELANGEL)    NEGATIVE  (NEGATIVE)  














  11/27/21 Range/Units





  13:27 


 


WBC   (4.0-11.0)  K/uL


 


RBC   (4.30-5.90)  M/uL


 


Hgb   (12.0-16.0)  g/dL


 


Hct   (36.0-46.0)  %


 


MCV   (80.0-98.0)  fL


 


MCH   (27.0-32.0)  pg


 


MCHC   (31.0-37.0)  g/dL


 


RDW Std Deviation   (28.0-62.0)  fl


 


RDW Coeff of Flo   (11.0-15.0)  %


 


Plt Count   (150-400)  K/uL


 


MPV   (7.40-12.00)  fL


 


Neut % (Auto)   (48.0-80.0)  %


 


Lymph % (Auto)   (16.0-40.0)  %


 


Mono % (Auto)   (0.0-15.0)  %


 


Eos % (Auto)   (0.0-7.0)  %


 


Baso % (Auto)   (0.0-1.5)  %


 


Neut # (Auto)   (1.4-5.7)  K/uL


 


Lymph # (Auto)   (0.6-2.4)  K/uL


 


Mono # (Auto)   (0.0-0.8)  K/uL


 


Eos # (Auto)   (0.0-0.7)  K/uL


 


Baso # (Auto)   (0.0-0.1)  K/uL


 


Nucleated RBC %   /100WBC


 


Nucleated RBCs #   K/uL


 


INR   


 


APTT   (18.6-31.3)  SEC


 


Sodium   (136-145)  mmol/L


 


Potassium   (3.5-5.1)  mmol/L


 


Chloride   ()  mmol/L


 


Carbon Dioxide   (21.0-32.0)  mmol/L


 


BUN   (7.0-18.0)  mg/dL


 


Creatinine   (0.6-1.0)  mg/dL


 


Est Cr Clr Drug Dosing   


 


Estimated GFR (MDRD)   ml/min


 


Glucose   ()  mg/dL


 


Lactic Acid   (0.4-2.0)  mmol/L


 


Calcium   (8.5-10.1)  mg/dL


 


Magnesium   (1.8-2.4)  mg/dL


 


Total Bilirubin   (0.2-1.0)  mg/dL


 


AST   (15-37)  IU/L


 


ALT   (14-63)  IU/L


 


Alkaline Phosphatase   ()  U/L


 


Total Protein   (6.4-8.2)  g/dL


 


Albumin   (3.4-5.0)  g/dL


 


Globulin   (2.6-4.0)  g/dL


 


Albumin/Globulin Ratio   (0.9-1.6)  


 


Lipase   ()  U/L


 


HCG, Qual   (NEG)  


 


Urine Color  ORANGE  


 


Urine Appearance  SLT CLOUDY  


 


Urine pH  6.0  (5.0-8.0)  


 


Ur Specific Gravity  >= 1.030  (1.001-1.035)  


 


Urine Protein  >=300 H  (NEGATIVE)  mg/dL


 


Urine Glucose (UA)  NEGATIVE  (NEGATIVE)  mg/dL


 


Urine Ketones  >=80  (NEGATIVE)  mg/dL


 


Urine Occult Blood  SMALL H  (NEGATIVE)  


 


Urine Nitrite  POSITIVE H  (NEGATIVE)  


 


Urine Bilirubin  LARGE H  (NEGATIVE)  


 


Urine Urobilinogen  2.0 H  (<2.0)  EU/dL


 


Ur Leukocyte Esterase  TRACE H  (NEGATIVE)  


 


U Hyaline Cast (Auto)  0-3  (0-2/LPF)  


 


Urine RBC  0-2  (0-2/HPF)  


 


Urine WBC  2-4  (0-5/HPF)  


 


Ur Epithelial Cells  MODERATE  (NONE-FEW)  


 


Urine Bacteria  1+ H  (NEGATIVE)  


 


Urine Trichomonas  PRESENT  (NEGATIVE)  


 


Ethyl Alcohol   mg/dL


 


SARS-CoV-2 RNA (MIGUELANGEL)   (NEGATIVE)  











Result Diagrams: 


                                 11/27/21 13:00





                                 11/27/21 13:00





Sepsis Event Note





- Evaluation


Sepsis Screening Result: No Definite Risk





- Focused Exam


Vital Signs: 


                                   Vital Signs











  Temp Pulse Resp BP Pulse Ox


 


 11/27/21 15:10   107 H  18  115/68  97


 


 11/27/21 14:27   111 H  18  126/80  94 L


 


 11/27/21 12:52  95.7 F L  133 H  18  144/94 H  98














- Problem List


(1) UTI (urinary tract infection)


SNOMED Code(s): 54781747


   ICD Code: N39.0 - URINARY TRACT INFECTION, SITE NOT SPECIFIED   Status: Acute

   Current Visit: Yes   





(2) Gastritis


SNOMED Code(s): 9437857


   ICD Code: K29.70 - GASTRITIS, UNSPECIFIED, WITHOUT BLEEDING   Status: Acute  

 Current Visit: Yes   





(3) IBD (inflammatory bowel disease)


SNOMED Code(s): 15399212


   ICD Code: K52.9 - NONINFECTIVE GASTROENTERITIS AND COLITIS, UNSPECIFIED   

Status: Acute   Current Visit: Yes   





(4) Anxiety


SNOMED Code(s): 14234802


   ICD Code: F41.9 - ANXIETY DISORDER, UNSPECIFIED   Status: Acute   Current 

Visit: No   





(5) High blood pressure


SNOMED Code(s): 38847164


   ICD Code: I10 - ESSENTIAL (PRIMARY) HYPERTENSION   Status: Acute   Current 

Visit: No   


Qualifiers: 


   Hypertension type: other secondary hypertension 





(6) Alcohol use


SNOMED Code(s): 995649


   ICD Code: Z78.9 - OTHER SPECIFIED HEALTH STATUS   Status: Acute   Current 

Visit: No   





(7) Alcohol withdrawal


SNOMED Code(s): 584708852


   ICD Code: F10.239 - ALCOHOL DEPENDENCE WITH WITHDRAWAL, UNSPECIFIED   Status:

 Acute   Current Visit: No   


Qualifiers: 


   Complication of substance-induced condition: uncomplicated   Qualified 

Code(s): F10.230 - Alcohol dependence with withdrawal, uncomplicated   





(8) Hypomagnesemia


SNOMED Code(s): 629673854


   ICD Code: E83.42 - HYPOMAGNESEMIA   Status: Acute   Current Visit: No   


Problem List Initiated/Reviewed/Updated: Yes


Orders Last 24hrs: 


                               Active Orders 24 hr











 Category Date Time Status


 


 Patient Status [ADT] Routine ADT  11/27/21 15:12 Active


 


 Antiembolic Devices [RC] PER UNIT ROUTINE Care  11/27/21 16:09 Ordered


 


 NPO [Nothing Per Oral Diet] [DIET] Diet  11/28/21 Breakfast Ordered


 


 CBC WITH AUTO DIFF [HEME] AM Lab  11/28/21 05:11 Ordered


 


 CBC WITH AUTO DIFF [HEME] AM Lab  11/29/21 05:11 Ordered


 


 CBC WITH AUTO DIFF [HEME] AM Lab  11/30/21 05:11 Ordered


 


 CBC WITH AUTO DIFF [HEME] AM Lab  12/01/21 05:11 Ordered


 


 CMP [COMPREHENSIVE METABOLIC PN,CMP] [CHEM] AM Lab  11/28/21 05:11 Ordered


 


 CMP [COMPREHENSIVE METABOLIC PN,CMP] [CHEM] AM Lab  11/29/21 05:11 Ordered


 


 CMP [COMPREHENSIVE METABOLIC PN,CMP] [CHEM] AM Lab  11/30/21 05:11 Ordered


 


 CMP [COMPREHENSIVE METABOLIC PN,CMP] [CHEM] AM Lab  12/01/21 05:11 Ordered


 


 HEPATITIS PANEL (4) [REF] Stat Lab  11/27/21 13:15 Received


 


 MAGNESIUM [CHEM] AM Lab  11/28/21 05:11 Ordered


 


 HYDROmorphone [Dilaudid] Med  11/27/21 16:14 Ordered





 1 mg IVPUSH Q3H PRN   


 


 LORazepam [Ativan] Med  11/27/21 16:02 Ordered





 See Protocol  IV Q4H PRN   


 


 Magnesium Sulfate/Water [Magnesium Sulfate in Water 2 Med  11/27/21 16:17 

Ordered





 GM/50 ML] 2 gm   





 Premix Bag 1 bag   





 IV ONETIME   


 


 Ondansetron [Zofran] Med  11/27/21 16:09 Ordered





 4 mg IVPUSH Q4H PRN   


 


 Pantoprazole [ProTONIX] Med  11/27/21 16:15 Ordered





 40 mg IVPUSH Q12H   


 


 Sodium Chloride 0.9% @ 150 MLS/HR (1,000ml) Med  11/27/21 16:15 Ordered





 Sodium Chloride 0.9% [Normal Saline] 1,000 ml   





 IV ASDIRECTED   


 


 Sodium Chloride 0.9% [Normal Saline] 1,000 ml Med  11/27/21 15:40 Active





 IV .Bolus   


 


 cefTRIAXone [Rocephin in Dextrose,Iso-Osm 2 GM/50 ML] 2 Med  11/27/21 16:15 

Ordered





 gm   





 Premix Bag 1 bag   





 IV Q24H   


 


 chlordiazePOXIDE [Librium] Med  11/27/21 16:15 Ordered





 50 mg PO Q6H PRN   


 


 methylPREDNISolone Sod Succ [Solu-MEDROL] Med  11/27/21 16:30 Ordered





 60 mg IVPUSH DAILY   


 


 metroNIDAZOLE/Normal Saline [Flagyl in  MG/100 ML Med  11/27/21 16:15 

Ordered





 ] 500 mg   





 Premix Bag 1 bag   





 IV Q8H   


 


 SCD [Sequential Compression Device] [OM.PC] Stat Oth  11/27/21 16:09 Ordered


 


 Saline Lock Insert [OM.PC] Stat Oth  11/27/21 13:07 Ordered


 


 Code Status [Resuscitation Status] Stat Resus Stat  11/27/21 16:05 Ordered








                                Medication Orders





Chlordiazepoxide HCl (Chlordiazepoxide 25 Mg Cap)  50 mg PO Q6H PRN


   PRN Reason: Withdrawal Symptoms


Hydromorphone HCl (Hydromorphone 4 Mg/Ml Syringe)  1 mg IVPUSH Q3H PRN


   PRN Reason: Pain (moderate 4-6)


Sodium Chloride (Normal Saline)  1,000 mls @ 125 mls/hr IV .Bolus ONE


   Stop: 11/27/21 23:39


Ceftriaxone Sodium/Dextrose 2 (gm/ Premix)  50 mls @ 100 mls/hr IV Q24H ERICH


Metronidazole 500 mg/ Premix  100 mls @ 100 mls/hr IV Q8H ERICH


Sodium Chloride (Normal Saline)  1,000 mls @ 150 mls/hr IV ASDIRECTED ERICH


Magnesium Sulfate 2 gm/ Premix  50 mls @ 12.5 mls/hr IV ONETIME ONE


   Stop: 11/27/21 20:16


Lorazepam (Lorazepam 2 Mg/Ml Sdv)  0 mg IV Q4H PRN; Protocol


   PRN Reason: Withdrawal Symptoms


Methylprednisolone Sodium Succinate (Methylprednisolone Sodium Succinate 40 Mg/1

Ml Sdv)  60 mg IVPUSH DAILY [facility]


Ondansetron HCl (Ondansetron 4 Mg/2 Ml Sdv)  4 mg IVPUSH Q4H PRN


   PRN Reason: Vomiting


Pantoprazole Sodium (Pantoprazole 40 Mg/10 Ml Syringe)  40 mg IVPUSH Q12H [facility]








Assessment/Plan Comment:: 


Admit the patient to the medical floor, vitals per unit routine, activity up ad 

rigo., DVT prophylaxis with sequential compression devices, GI prophylaxis with 

pantoprazole, nothing per oral except ice chips, the patient is full code





1. Gastritis


-The patient is on Protonix 40 mg per IV route every 12 hours to protect GI 

lining


-Dilaudid 1 mg every 3 hours as needed is on board for abdominal pain


-Zofran 4 mg per IV route is on board for nausea and vomiting





2. Alcohol withdrawal symptoms


-The patient is on Librium 50 mg every 6 hours scheduled


-CIWA protocol is in place, give Ativan as appropriate





3. Ulcerative colitis


-We have started methylprednisolone 60 mg once a day to decrease inflammation


-We have initiated normal saline 150 mL/h for hydration





4. Urinary tract infection


-We has initiated ceftriaxone 2 g per IV route every 24 hours


-We have initiated Flagyl per IV route 500 mg every 8 hours





5. Hypomagnesemia


-We have given a 2 g IV loading dose of magnesium sulfate


-We will monitor magnesium levels and replete as needed








<Javan Sanchez - Last Filed: 11/27/21 20:33>





H&P History of Present Illness





- General


Admit Problem/Dx: 


                           Admission Diagnosis/Problem





Admission Diagnosis/Problem      Abdominal pain











Exam





- Vital Signs


Vital Signs: 


                                Last Vital Signs











Temp  97.2 F   11/27/21 16:53


 


Pulse  123 H  11/27/21 16:53


 


Resp  12   11/27/21 16:53


 


BP  106/72   11/27/21 16:53


 


Pulse Ox  95   11/27/21 16:53














- Patient Data


Lab Results Last 24 hrs: 


                         Laboratory Results - last 24 hr











  11/27/21 11/27/21 11/27/21 Range/Units





  13:00 13:00 13:00 


 


WBC  9.56    (4.0-11.0)  K/uL


 


RBC  4.07 L    (4.30-5.90)  M/uL


 


Hgb  14.6    (12.0-16.0)  g/dL


 


Hct  42.9    (36.0-46.0)  %


 


MCV  105.4 H    (80.0-98.0)  fL


 


MCH  35.9 H    (27.0-32.0)  pg


 


MCHC  34.0    (31.0-37.0)  g/dL


 


RDW Std Deviation  52.8    (28.0-62.0)  fl


 


RDW Coeff of Flo  14    (11.0-15.0)  %


 


Plt Count  122 L    (150-400)  K/uL


 


MPV  11.50    (7.40-12.00)  fL


 


Neut % (Auto)  82.7 H    (48.0-80.0)  %


 


Lymph % (Auto)  9.3 L    (16.0-40.0)  %


 


Mono % (Auto)  7.5    (0.0-15.0)  %


 


Eos % (Auto)  0.2    (0.0-7.0)  %


 


Baso % (Auto)  0.3    (0.0-1.5)  %


 


Neut # (Auto)  7.9 H    (1.4-5.7)  K/uL


 


Lymph # (Auto)  0.9    (0.6-2.4)  K/uL


 


Mono # (Auto)  0.7    (0.0-0.8)  K/uL


 


Eos # (Auto)  0.0    (0.0-0.7)  K/uL


 


Baso # (Auto)  0.0    (0.0-0.1)  K/uL


 


Nucleated RBC %  0.0    /100WBC


 


Nucleated RBCs #  0    K/uL


 


INR     


 


APTT     (18.6-31.3)  SEC


 


Sodium   131 L   (136-145)  mmol/L


 


Potassium   4.2   (3.5-5.1)  mmol/L


 


Chloride   89 L   ()  mmol/L


 


Carbon Dioxide   14.0 L   (21.0-32.0)  mmol/L


 


BUN   15   (7.0-18.0)  mg/dL


 


Creatinine   1.0   (0.6-1.0)  mg/dL


 


Est Cr Clr Drug Dosing   TNP   


 


Estimated GFR (MDRD)   > 60.0   ml/min


 


Glucose   159 H   ()  mg/dL


 


Lactic Acid    1.8  (0.4-2.0)  mmol/L


 


Calcium   12.2 H   (8.5-10.1)  mg/dL


 


Magnesium   1.7 L   (1.8-2.4)  mg/dL


 


Total Bilirubin   5.0 H   (0.2-1.0)  mg/dL


 


AST   614 H   (15-37)  IU/L


 


ALT   206 H   (14-63)  IU/L


 


Alkaline Phosphatase   214 H   ()  U/L


 


Total Protein   10.2 H   (6.4-8.2)  g/dL


 


Albumin   3.3 L   (3.4-5.0)  g/dL


 


Globulin   6.9 H   (2.6-4.0)  g/dL


 


Albumin/Globulin Ratio   0.5 L   (0.9-1.6)  


 


Lipase   254   ()  U/L


 


HCG, Qual     (NEG)  


 


Urine Color     


 


Urine Appearance     


 


Urine pH     (5.0-8.0)  


 


Ur Specific Gravity     (1.001-1.035)  


 


Urine Protein     (NEGATIVE)  mg/dL


 


Urine Glucose (UA)     (NEGATIVE)  mg/dL


 


Urine Ketones     (NEGATIVE)  mg/dL


 


Urine Occult Blood     (NEGATIVE)  


 


Urine Nitrite     (NEGATIVE)  


 


Urine Bilirubin     (NEGATIVE)  


 


Urine Urobilinogen     (<2.0)  EU/dL


 


Ur Leukocyte Esterase     (NEGATIVE)  


 


U Hyaline Cast (Auto)     (0-2/LPF)  


 


Urine RBC     (0-2/HPF)  


 


Urine WBC     (0-5/HPF)  


 


Ur Epithelial Cells     (NONE-FEW)  


 


Urine Bacteria     (NEGATIVE)  


 


Urine Trichomonas     (NEGATIVE)  


 


Ethyl Alcohol   <3   mg/dL


 


SARS-CoV-2 RNA (MIGUELANGEL)     (NEGATIVE)  














  11/27/21 11/27/21 11/27/21 Range/Units





  13:00 13:00 13:15 


 


WBC     (4.0-11.0)  K/uL


 


RBC     (4.30-5.90)  M/uL


 


Hgb     (12.0-16.0)  g/dL


 


Hct     (36.0-46.0)  %


 


MCV     (80.0-98.0)  fL


 


MCH     (27.0-32.0)  pg


 


MCHC     (31.0-37.0)  g/dL


 


RDW Std Deviation     (28.0-62.0)  fl


 


RDW Coeff of Flo     (11.0-15.0)  %


 


Plt Count     (150-400)  K/uL


 


MPV     (7.40-12.00)  fL


 


Neut % (Auto)     (48.0-80.0)  %


 


Lymph % (Auto)     (16.0-40.0)  %


 


Mono % (Auto)     (0.0-15.0)  %


 


Eos % (Auto)     (0.0-7.0)  %


 


Baso % (Auto)     (0.0-1.5)  %


 


Neut # (Auto)     (1.4-5.7)  K/uL


 


Lymph # (Auto)     (0.6-2.4)  K/uL


 


Mono # (Auto)     (0.0-0.8)  K/uL


 


Eos # (Auto)     (0.0-0.7)  K/uL


 


Baso # (Auto)     (0.0-0.1)  K/uL


 


Nucleated RBC %     /100WBC


 


Nucleated RBCs #     K/uL


 


INR  1.11    


 


APTT  24.3    (18.6-31.3)  SEC


 


Sodium     (136-145)  mmol/L


 


Potassium     (3.5-5.1)  mmol/L


 


Chloride     ()  mmol/L


 


Carbon Dioxide     (21.0-32.0)  mmol/L


 


BUN     (7.0-18.0)  mg/dL


 


Creatinine     (0.6-1.0)  mg/dL


 


Est Cr Clr Drug Dosing     


 


Estimated GFR (MDRD)     ml/min


 


Glucose     ()  mg/dL


 


Lactic Acid     (0.4-2.0)  mmol/L


 


Calcium     (8.5-10.1)  mg/dL


 


Magnesium     (1.8-2.4)  mg/dL


 


Total Bilirubin     (0.2-1.0)  mg/dL


 


AST     (15-37)  IU/L


 


ALT     (14-63)  IU/L


 


Alkaline Phosphatase     ()  U/L


 


Total Protein     (6.4-8.2)  g/dL


 


Albumin     (3.4-5.0)  g/dL


 


Globulin     (2.6-4.0)  g/dL


 


Albumin/Globulin Ratio     (0.9-1.6)  


 


Lipase     ()  U/L


 


HCG, Qual   NEGATIVE   (NEG)  


 


Urine Color     


 


Urine Appearance     


 


Urine pH     (5.0-8.0)  


 


Ur Specific Gravity     (1.001-1.035)  


 


Urine Protein     (NEGATIVE)  mg/dL


 


Urine Glucose (UA)     (NEGATIVE)  mg/dL


 


Urine Ketones     (NEGATIVE)  mg/dL


 


Urine Occult Blood     (NEGATIVE)  


 


Urine Nitrite     (NEGATIVE)  


 


Urine Bilirubin     (NEGATIVE)  


 


Urine Urobilinogen     (<2.0)  EU/dL


 


Ur Leukocyte Esterase     (NEGATIVE)  


 


U Hyaline Cast (Auto)     (0-2/LPF)  


 


Urine RBC     (0-2/HPF)  


 


Urine WBC     (0-5/HPF)  


 


Ur Epithelial Cells     (NONE-FEW)  


 


Urine Bacteria     (NEGATIVE)  


 


Urine Trichomonas     (NEGATIVE)  


 


Ethyl Alcohol     mg/dL


 


SARS-CoV-2 RNA (MIGUELANGEL)    NEGATIVE  (NEGATIVE)  














  11/27/21 Range/Units





  13:27 


 


WBC   (4.0-11.0)  K/uL


 


RBC   (4.30-5.90)  M/uL


 


Hgb   (12.0-16.0)  g/dL


 


Hct   (36.0-46.0)  %


 


MCV   (80.0-98.0)  fL


 


MCH   (27.0-32.0)  pg


 


MCHC   (31.0-37.0)  g/dL


 


RDW Std Deviation   (28.0-62.0)  fl


 


RDW Coeff of Flo   (11.0-15.0)  %


 


Plt Count   (150-400)  K/uL


 


MPV   (7.40-12.00)  fL


 


Neut % (Auto)   (48.0-80.0)  %


 


Lymph % (Auto)   (16.0-40.0)  %


 


Mono % (Auto)   (0.0-15.0)  %


 


Eos % (Auto)   (0.0-7.0)  %


 


Baso % (Auto)   (0.0-1.5)  %


 


Neut # (Auto)   (1.4-5.7)  K/uL


 


Lymph # (Auto)   (0.6-2.4)  K/uL


 


Mono # (Auto)   (0.0-0.8)  K/uL


 


Eos # (Auto)   (0.0-0.7)  K/uL


 


Baso # (Auto)   (0.0-0.1)  K/uL


 


Nucleated RBC %   /100WBC


 


Nucleated RBCs #   K/uL


 


INR   


 


APTT   (18.6-31.3)  SEC


 


Sodium   (136-145)  mmol/L


 


Potassium   (3.5-5.1)  mmol/L


 


Chloride   ()  mmol/L


 


Carbon Dioxide   (21.0-32.0)  mmol/L


 


BUN   (7.0-18.0)  mg/dL


 


Creatinine   (0.6-1.0)  mg/dL


 


Est Cr Clr Drug Dosing   


 


Estimated GFR (MDRD)   ml/min


 


Glucose   ()  mg/dL


 


Lactic Acid   (0.4-2.0)  mmol/L


 


Calcium   (8.5-10.1)  mg/dL


 


Magnesium   (1.8-2.4)  mg/dL


 


Total Bilirubin   (0.2-1.0)  mg/dL


 


AST   (15-37)  IU/L


 


ALT   (14-63)  IU/L


 


Alkaline Phosphatase   ()  U/L


 


Total Protein   (6.4-8.2)  g/dL


 


Albumin   (3.4-5.0)  g/dL


 


Globulin   (2.6-4.0)  g/dL


 


Albumin/Globulin Ratio   (0.9-1.6)  


 


Lipase   ()  U/L


 


HCG, Qual   (NEG)  


 


Urine Color  ORANGE  


 


Urine Appearance  SLT CLOUDY  


 


Urine pH  6.0  (5.0-8.0)  


 


Ur Specific Gravity  >= 1.030  (1.001-1.035)  


 


Urine Protein  >=300 H  (NEGATIVE)  mg/dL


 


Urine Glucose (UA)  NEGATIVE  (NEGATIVE)  mg/dL


 


Urine Ketones  >=80  (NEGATIVE)  mg/dL


 


Urine Occult Blood  SMALL H  (NEGATIVE)  


 


Urine Nitrite  POSITIVE H  (NEGATIVE)  


 


Urine Bilirubin  LARGE H  (NEGATIVE)  


 


Urine Urobilinogen  2.0 H  (<2.0)  EU/dL


 


Ur Leukocyte Esterase  TRACE H  (NEGATIVE)  


 


U Hyaline Cast (Auto)  0-3  (0-2/LPF)  


 


Urine RBC  0-2  (0-2/HPF)  


 


Urine WBC  2-4  (0-5/HPF)  


 


Ur Epithelial Cells  MODERATE  (NONE-FEW)  


 


Urine Bacteria  1+ H  (NEGATIVE)  


 


Urine Trichomonas  PRESENT  (NEGATIVE)  


 


Ethyl Alcohol   mg/dL


 


SARS-CoV-2 RNA (MIGUELANGEL)   (NEGATIVE)  











Result Diagrams: 


                                 11/27/21 13:00





                                 11/27/21 13:00





Sepsis Event Note





- Focused Exam


Vital Signs: 


                                   Vital Signs











  Temp Pulse Resp BP Pulse Ox


 


 11/27/21 16:53  97.2 F  123 H  12  106/72  95


 


 11/27/21 16:27   101 H  18  110/67  95


 


 11/27/21 15:10   107 H  18  115/68  97


 


 11/27/21 14:27   111 H  18  126/80  94 L


 


 11/27/21 12:52  95.7 F L  133 H  18  144/94 H  98











Problem List Initiated/Reviewed/Updated: Yes


Orders Last 24hrs: 


                               Active Orders 24 hr











 Category Date Time Status


 


 Patient Status [ADT] Routine ADT  11/27/21 15:12 Active


 


 Antiembolic Devices [RC] PER UNIT ROUTINE Care  11/27/21 16:09 Active


 


 NPO [Nothing Per Oral Diet] [DIET] Diet  11/28/21 Breakfast Active


 


 CBC WITH AUTO DIFF [HEME] AM Lab  11/28/21 05:11 Ordered


 


 CBC WITH AUTO DIFF [HEME] AM Lab  11/29/21 05:11 Ordered


 


 CBC WITH AUTO DIFF [HEME] AM Lab  11/30/21 05:11 Ordered


 


 CBC WITH AUTO DIFF [HEME] AM Lab  12/01/21 05:11 Ordered


 


 CMP [COMPREHENSIVE METABOLIC PN,CMP] [CHEM] AM Lab  11/28/21 05:11 Ordered


 


 CMP [COMPREHENSIVE METABOLIC PN,CMP] [CHEM] AM Lab  11/29/21 05:11 Ordered


 


 CMP [COMPREHENSIVE METABOLIC PN,CMP] [CHEM] AM Lab  11/30/21 05:11 Ordered


 


 CMP [COMPREHENSIVE METABOLIC PN,CMP] [CHEM] AM Lab  12/01/21 05:11 Ordered


 


 HEPATITIS PANEL (4) [REF] Stat Lab  11/27/21 13:15 Received


 


 MAGNESIUM [CHEM] AM Lab  11/28/21 05:11 Ordered


 


 HYDROmorphone [Dilaudid] Med  11/27/21 16:14 Active





 1 mg IVPUSH Q3H PRN   


 


 LORazepam [Ativan] Med  11/27/21 16:02 Active





 See Protocol  IV Q4H PRN   


 


 Ondansetron [Zofran] Med  11/27/21 16:09 Active





 4 mg IVPUSH Q4H PRN   


 


 Pantoprazole [ProTONIX] Med  11/27/21 16:15 Active





 40 mg IVPUSH Q12H   


 


 Sodium Chloride 0.9% [Normal Saline] 1,000 ml Med  11/27/21 15:40 Active





 IV .Bolus   


 


 Sodium Chloride 0.9% [Normal Saline] 1,000 ml Med  11/27/21 16:15 Active





 IV ASDIRECTED   


 


 cefTRIAXone [Rocephin in Dextrose,Iso-Osm 2 GM/50 ML] 2 Med  11/28/21 16:00 

Active





 gm   





 Premix Bag 1 bag   





 IV Q24H   


 


 chlordiazePOXIDE [Librium] Med  11/27/21 16:15 Active





 50 mg PO Q6H PRN   


 


 methylPREDNISolone Sod Succ [Solu-MEDROL] Med  11/27/21 16:30 Active





 60 mg IVPUSH DAILY   


 


 metroNIDAZOLE/Normal Saline [Flagyl in  MG/100 ML Med  11/27/21 22:00 

Active





 ] 500 mg   





 Premix Bag 1 bag   





 IV Q8H   


 


 SCD [Sequential Compression Device] [OM.PC] Stat Oth  11/27/21 16:09 Ordered


 


 Saline Lock Insert [OM.PC] Stat Oth  11/27/21 13:07 Ordered


 


 Code Status [Resuscitation Status] Stat Resus Stat  11/27/21 16:05 Ordered








                                Medication Orders





Chlordiazepoxide HCl (Chlordiazepoxide 25 Mg Cap)  50 mg PO Q6H PRN


   PRN Reason: Withdrawal Symptoms


Hydromorphone HCl (Hydromorphone 4 Mg/Ml Syringe)  1 mg IVPUSH Q3H PRN


   PRN Reason: Pain (moderate 4-6)


Sodium Chloride (Normal Saline)  1,000 mls @ 125 mls/hr IV .Bolus ONE


   Stop: 11/27/21 23:39


   Last Admin: 11/27/21 18:01 Dose:  Not Given


   Documented by: GOLDY


Sodium Chloride (Normal Saline)  1,000 mls @ 150 mls/hr IV ASDIRECTED [facility]


Metronidazole 500 mg/ Premix  100 mls @ 100 mls/hr IV Q8H [facility]


Ceftriaxone Sodium/Dextrose 2 (gm/ Premix)  50 mls @ 100 mls/hr IV Q24H [facility]


Lorazepam (Lorazepam 2 Mg/Ml Sdv)  0 mg IV Q4H PRN; Protocol


   PRN Reason: Withdrawal Symptoms


Methylprednisolone Sodium Succinate (Methylprednisolone Sodium Succinate 40 Mg/1

Ml Sdv)  60 mg IVPUSH DAILY [facility]


   Last Admin: 11/27/21 18:22  Dose: 60 mg


   Documented by: LORRIE


Ondansetron HCl (Ondansetron 4 Mg/2 Ml Sdv)  4 mg IVPUSH Q4H PRN


   PRN Reason: Vomiting


Pantoprazole Sodium (Pantoprazole 40 Mg/10 Ml Syringe)  40 mg IVPUSH Q12H [facility]


   Last Admin: 11/27/21 18:25  Dose: 40 mg


   Documented by: LORRIE








Assessment/Plan Comment:: 





I agree with the above assessment and plan .

## 2021-11-27 NOTE — EDM.PDOC
ED HPI GENERAL MEDICAL PROBLEM





- General


Chief Complaint: Gastrointestinal Problem


Stated Complaint: CHEST HURTS/ CANT HOLD ANYTHING DOWN/CONSTANT PUK


Time Seen by Provider: 11/27/21 12:35


Source of Information: Reports: Patient


History Limitations: Reports: No Limitations





- History of Present Illness


INITIAL COMMENTS - FREE TEXT/NARRATIVE: 





36-year-old female past medical history alcohol abuse, alcohol withdrawal, 

seizures presents for abdominal pain, nausea, vomiting.  Patient denies history 

of pancreatitis but has had gastritis in the past.  She notes that she was 

trying to cut down on her drinking but over the last week has had worsening 

generalized abdominal pain worse in her midepigastrium radiating to the back 

associated with nausea and about 1 episode of emesis per day.  Over the last 

couple of days the vomiting has become worse to the point that yesterday she 

believes she vomited roughly 20 times.  She notes a burning sensation going up 

her midepigastrium into her throat worse after vomiting.  She notes her last 

drink was about 3 shots yesterday.  She does feel a bit shaky and anxious and is

concerned that she may be going through alcohol withdrawal.  Denies any recent 

seizures.


  ** abd


Pain Score (Numeric/FACES): 5





- Related Data


                                    Allergies











Allergy/AdvReac Type Severity Reaction Status Date / Time


 


No Known Allergies Allergy   Verified 11/27/21 12:52











Home Meds: 


                                    Home Meds





. [No Known Home Meds]  07/29/21 [History]











Past Medical History





- Past Health History


Medical/Surgical History: Denies Medical/Surgical History


HEENT History: Reports: Other (See Below)


Other HEENT History: Ulcers in both eyes, previous abcess in R side of mouth, 

glasses


Cardiovascular History: Reports: Hypertension


Respiratory History: Reports: Asthma


Gastrointestinal History: Reports: Other (See Below)


Other Gastrointestinal History: colitis


Genitourinary History: Reports: None


OB/GYN History: Reports: Other (See Below)


Other OB/GYN History: migraines when menstruating


Musculoskeletal History: Reports: None


Neurological History: Reports: None


Psychiatric History: Reports: Addiction, Anxiety, Depression, Panic Attack, 

Suicide Attempt, Suicidal Ideation


Other Psychiatric History: ETOH addiction.  has been to rehab twice, quit on own

 as well


Endocrine/Metabolic History: Reports: None


Insulin Pump Model and : N/A


Hematologic History: Reports: None


Immunologic History: Reports: None


Oncologic (Cancer) History: Reports: None


Dermatologic History: Reports: None





- Infectious Disease History


Infectious Disease History: Reports: Chicken Pox





- Past Surgical History


Head Surgeries/Procedures: Reports: None


HEENT Surgical History: Reports: None


Cardiovascular Surgical History: Reports: None


Respiratory Surgical History: Reports: None


GI Surgical History: Reports: None


Endocrine Surgical History: Reports: None


Neurological Surgical History: Reports: None


Musculoskeletal Surgical History: Reports: None


Dermatological Surgical History: Reports: None





Social & Family History





- Family History


Family Medical History: No Pertinent Family History


HEENT: Reports: None


Cardiac: Reports: None


Endocrine/Metabolic: Reports: None, Diabetes, Type I





- Caffeine Use


Caffeine Use: Reports: Coffee


Other Caffeine Use: rarely





ED ROS GENERAL





- Review of Systems


Review Of Systems: Comprehensive ROS is negative, except as noted in HPI.





ED EXAM, GENERAL





- Physical Exam


Exam: See Below


Exam Limited By: No Limitations


General Appearance: Alert, WD/WN, No Apparent Distress


Eye Exam: Bilateral Eye: Other (Scleral icterus)


Ears: Hearing Grossly Normal


Throat/Mouth: Normal Voice, No Airway Compromise


Head: Atraumatic, Normocephalic


Respiratory/Chest: No Respiratory Distress, Lungs Clear, Normal Breath Sounds, 

No Accessory Muscle Use


Cardiovascular: Normal Peripheral Pulses, Tachycardia


GI/Abdominal: Soft, Other (Mild midepigastric tenderness to palpation without 

guarding)


Extremities: Normal Inspection


Neurological: Alert, Normal Cognition, Normal Gait


Psychiatric: Normal Affect, Normal Mood


Skin Exam: Warm, Dry, Intact





Course





- Vital Signs


Last Recorded V/S: 


                                Last Vital Signs











Temp  95.7 F L  11/27/21 12:52


 


Pulse  107 H  11/27/21 15:10


 


Resp  18   11/27/21 15:10


 


BP  115/68   11/27/21 15:10


 


Pulse Ox  97   11/27/21 15:10














- Orders/Labs/Meds


Orders: 


                               Active Orders 24 hr











 Category Date Time Status


 


 HEPATITIS PANEL (4) [REF] Stat Lab  11/27/21 13:15 Received


 


 Saline Lock Insert [OM.PC] Stat Oth  11/27/21 13:07 Ordered











Labs: 


                                Laboratory Tests











  11/27/21 11/27/21 11/27/21 Range/Units





  13:00 13:00 13:00 


 


WBC  9.56    (4.0-11.0)  K/uL


 


RBC  4.07 L    (4.30-5.90)  M/uL


 


Hgb  14.6    (12.0-16.0)  g/dL


 


Hct  42.9    (36.0-46.0)  %


 


MCV  105.4 H    (80.0-98.0)  fL


 


MCH  35.9 H    (27.0-32.0)  pg


 


MCHC  34.0    (31.0-37.0)  g/dL


 


RDW Std Deviation  52.8    (28.0-62.0)  fl


 


RDW Coeff of Flo  14    (11.0-15.0)  %


 


Plt Count  122 L    (150-400)  K/uL


 


MPV  11.50    (7.40-12.00)  fL


 


Neut % (Auto)  82.7 H    (48.0-80.0)  %


 


Lymph % (Auto)  9.3 L    (16.0-40.0)  %


 


Mono % (Auto)  7.5    (0.0-15.0)  %


 


Eos % (Auto)  0.2    (0.0-7.0)  %


 


Baso % (Auto)  0.3    (0.0-1.5)  %


 


Neut # (Auto)  7.9 H    (1.4-5.7)  K/uL


 


Lymph # (Auto)  0.9    (0.6-2.4)  K/uL


 


Mono # (Auto)  0.7    (0.0-0.8)  K/uL


 


Eos # (Auto)  0.0    (0.0-0.7)  K/uL


 


Baso # (Auto)  0.0    (0.0-0.1)  K/uL


 


Nucleated RBC %  0.0    /100WBC


 


Nucleated RBCs #  0    K/uL


 


INR     


 


APTT     (18.6-31.3)  SEC


 


Sodium   131 L   (136-145)  mmol/L


 


Potassium   4.2   (3.5-5.1)  mmol/L


 


Chloride   89 L   ()  mmol/L


 


Carbon Dioxide   14.0 L   (21.0-32.0)  mmol/L


 


BUN   15   (7.0-18.0)  mg/dL


 


Creatinine   1.0   (0.6-1.0)  mg/dL


 


Est Cr Clr Drug Dosing   TNP   


 


Estimated GFR (MDRD)   > 60.0   ml/min


 


Glucose   159 H   ()  mg/dL


 


Lactic Acid    1.8  (0.4-2.0)  mmol/L


 


Calcium   12.2 H   (8.5-10.1)  mg/dL


 


Magnesium   1.7 L   (1.8-2.4)  mg/dL


 


Total Bilirubin   5.0 H   (0.2-1.0)  mg/dL


 


AST   614 H   (15-37)  IU/L


 


ALT   206 H   (14-63)  IU/L


 


Alkaline Phosphatase   214 H   ()  U/L


 


Total Protein   10.2 H   (6.4-8.2)  g/dL


 


Albumin   3.3 L   (3.4-5.0)  g/dL


 


Globulin   6.9 H   (2.6-4.0)  g/dL


 


Albumin/Globulin Ratio   0.5 L   (0.9-1.6)  


 


Lipase   254   ()  U/L


 


HCG, Qual     (NEG)  


 


Urine Color     


 


Urine Appearance     


 


Urine pH     (5.0-8.0)  


 


Ur Specific Gravity     (1.001-1.035)  


 


Urine Protein     (NEGATIVE)  mg/dL


 


Urine Glucose (UA)     (NEGATIVE)  mg/dL


 


Urine Ketones     (NEGATIVE)  mg/dL


 


Urine Occult Blood     (NEGATIVE)  


 


Urine Nitrite     (NEGATIVE)  


 


Urine Bilirubin     (NEGATIVE)  


 


Urine Urobilinogen     (<2.0)  EU/dL


 


Ur Leukocyte Esterase     (NEGATIVE)  


 


U Hyaline Cast (Auto)     (0-2/LPF)  


 


Urine RBC     (0-2/HPF)  


 


Urine WBC     (0-5/HPF)  


 


Ur Epithelial Cells     (NONE-FEW)  


 


Urine Bacteria     (NEGATIVE)  


 


Urine Trichomonas     (NEGATIVE)  


 


Ethyl Alcohol   <3   mg/dL


 


SARS-CoV-2 RNA (MIGUELANGEL)     (NEGATIVE)  














  11/27/21 11/27/21 11/27/21 Range/Units





  13:00 13:00 13:15 


 


WBC     (4.0-11.0)  K/uL


 


RBC     (4.30-5.90)  M/uL


 


Hgb     (12.0-16.0)  g/dL


 


Hct     (36.0-46.0)  %


 


MCV     (80.0-98.0)  fL


 


MCH     (27.0-32.0)  pg


 


MCHC     (31.0-37.0)  g/dL


 


RDW Std Deviation     (28.0-62.0)  fl


 


RDW Coeff of Flo     (11.0-15.0)  %


 


Plt Count     (150-400)  K/uL


 


MPV     (7.40-12.00)  fL


 


Neut % (Auto)     (48.0-80.0)  %


 


Lymph % (Auto)     (16.0-40.0)  %


 


Mono % (Auto)     (0.0-15.0)  %


 


Eos % (Auto)     (0.0-7.0)  %


 


Baso % (Auto)     (0.0-1.5)  %


 


Neut # (Auto)     (1.4-5.7)  K/uL


 


Lymph # (Auto)     (0.6-2.4)  K/uL


 


Mono # (Auto)     (0.0-0.8)  K/uL


 


Eos # (Auto)     (0.0-0.7)  K/uL


 


Baso # (Auto)     (0.0-0.1)  K/uL


 


Nucleated RBC %     /100WBC


 


Nucleated RBCs #     K/uL


 


INR  1.11    


 


APTT  24.3    (18.6-31.3)  SEC


 


Sodium     (136-145)  mmol/L


 


Potassium     (3.5-5.1)  mmol/L


 


Chloride     ()  mmol/L


 


Carbon Dioxide     (21.0-32.0)  mmol/L


 


BUN     (7.0-18.0)  mg/dL


 


Creatinine     (0.6-1.0)  mg/dL


 


Est Cr Clr Drug Dosing     


 


Estimated GFR (MDRD)     ml/min


 


Glucose     ()  mg/dL


 


Lactic Acid     (0.4-2.0)  mmol/L


 


Calcium     (8.5-10.1)  mg/dL


 


Magnesium     (1.8-2.4)  mg/dL


 


Total Bilirubin     (0.2-1.0)  mg/dL


 


AST     (15-37)  IU/L


 


ALT     (14-63)  IU/L


 


Alkaline Phosphatase     ()  U/L


 


Total Protein     (6.4-8.2)  g/dL


 


Albumin     (3.4-5.0)  g/dL


 


Globulin     (2.6-4.0)  g/dL


 


Albumin/Globulin Ratio     (0.9-1.6)  


 


Lipase     ()  U/L


 


HCG, Qual   NEGATIVE   (NEG)  


 


Urine Color     


 


Urine Appearance     


 


Urine pH     (5.0-8.0)  


 


Ur Specific Gravity     (1.001-1.035)  


 


Urine Protein     (NEGATIVE)  mg/dL


 


Urine Glucose (UA)     (NEGATIVE)  mg/dL


 


Urine Ketones     (NEGATIVE)  mg/dL


 


Urine Occult Blood     (NEGATIVE)  


 


Urine Nitrite     (NEGATIVE)  


 


Urine Bilirubin     (NEGATIVE)  


 


Urine Urobilinogen     (<2.0)  EU/dL


 


Ur Leukocyte Esterase     (NEGATIVE)  


 


U Hyaline Cast (Auto)     (0-2/LPF)  


 


Urine RBC     (0-2/HPF)  


 


Urine WBC     (0-5/HPF)  


 


Ur Epithelial Cells     (NONE-FEW)  


 


Urine Bacteria     (NEGATIVE)  


 


Urine Trichomonas     (NEGATIVE)  


 


Ethyl Alcohol     mg/dL


 


SARS-CoV-2 RNA (MIGUELANGEL)    NEGATIVE  (NEGATIVE)  














  11/27/21 Range/Units





  13:27 


 


WBC   (4.0-11.0)  K/uL


 


RBC   (4.30-5.90)  M/uL


 


Hgb   (12.0-16.0)  g/dL


 


Hct   (36.0-46.0)  %


 


MCV   (80.0-98.0)  fL


 


MCH   (27.0-32.0)  pg


 


MCHC   (31.0-37.0)  g/dL


 


RDW Std Deviation   (28.0-62.0)  fl


 


RDW Coeff of Flo   (11.0-15.0)  %


 


Plt Count   (150-400)  K/uL


 


MPV   (7.40-12.00)  fL


 


Neut % (Auto)   (48.0-80.0)  %


 


Lymph % (Auto)   (16.0-40.0)  %


 


Mono % (Auto)   (0.0-15.0)  %


 


Eos % (Auto)   (0.0-7.0)  %


 


Baso % (Auto)   (0.0-1.5)  %


 


Neut # (Auto)   (1.4-5.7)  K/uL


 


Lymph # (Auto)   (0.6-2.4)  K/uL


 


Mono # (Auto)   (0.0-0.8)  K/uL


 


Eos # (Auto)   (0.0-0.7)  K/uL


 


Baso # (Auto)   (0.0-0.1)  K/uL


 


Nucleated RBC %   /100WBC


 


Nucleated RBCs #   K/uL


 


INR   


 


APTT   (18.6-31.3)  SEC


 


Sodium   (136-145)  mmol/L


 


Potassium   (3.5-5.1)  mmol/L


 


Chloride   ()  mmol/L


 


Carbon Dioxide   (21.0-32.0)  mmol/L


 


BUN   (7.0-18.0)  mg/dL


 


Creatinine   (0.6-1.0)  mg/dL


 


Est Cr Clr Drug Dosing   


 


Estimated GFR (MDRD)   ml/min


 


Glucose   ()  mg/dL


 


Lactic Acid   (0.4-2.0)  mmol/L


 


Calcium   (8.5-10.1)  mg/dL


 


Magnesium   (1.8-2.4)  mg/dL


 


Total Bilirubin   (0.2-1.0)  mg/dL


 


AST   (15-37)  IU/L


 


ALT   (14-63)  IU/L


 


Alkaline Phosphatase   ()  U/L


 


Total Protein   (6.4-8.2)  g/dL


 


Albumin   (3.4-5.0)  g/dL


 


Globulin   (2.6-4.0)  g/dL


 


Albumin/Globulin Ratio   (0.9-1.6)  


 


Lipase   ()  U/L


 


HCG, Qual   (NEG)  


 


Urine Color  ORANGE  


 


Urine Appearance  SLT CLOUDY  


 


Urine pH  6.0  (5.0-8.0)  


 


Ur Specific Gravity  >= 1.030  (1.001-1.035)  


 


Urine Protein  >=300 H  (NEGATIVE)  mg/dL


 


Urine Glucose (UA)  NEGATIVE  (NEGATIVE)  mg/dL


 


Urine Ketones  >=80  (NEGATIVE)  mg/dL


 


Urine Occult Blood  SMALL H  (NEGATIVE)  


 


Urine Nitrite  POSITIVE H  (NEGATIVE)  


 


Urine Bilirubin  LARGE H  (NEGATIVE)  


 


Urine Urobilinogen  2.0 H  (<2.0)  EU/dL


 


Ur Leukocyte Esterase  TRACE H  (NEGATIVE)  


 


U Hyaline Cast (Auto)  0-3  (0-2/LPF)  


 


Urine RBC  0-2  (0-2/HPF)  


 


Urine WBC  2-4  (0-5/HPF)  


 


Ur Epithelial Cells  MODERATE  (NONE-FEW)  


 


Urine Bacteria  1+ H  (NEGATIVE)  


 


Urine Trichomonas  PRESENT  (NEGATIVE)  


 


Ethyl Alcohol   mg/dL


 


SARS-CoV-2 RNA (MIGUELANGEL)   (NEGATIVE)  











Meds: 


Medications














Discontinued Medications














Generic Name Dose Route Start Last Admin





  Trade Name Freq  PRN Reason Stop Dose Admin


 


Sodium Chloride  1,000 mls @ 999 mls/hr  11/27/21 13:07  11/27/21 13:23





  Normal Saline  IV  11/27/21 14:07  999 mls/hr





  .Bolus ONE   Administration


 


Ceftriaxone Sodium/Dextrose 1  50 mls @ 100 mls/hr  11/27/21 13:43  11/27/21 

14:04





  gm/ Premix  IV  11/27/21 14:12  100 mls/hr





  ONETIME ONE   Administration


 


Lorazepam  1 mg  11/27/21 13:07  11/27/21 13:24





  Lorazepam 2 Mg/Ml Sdv  IVPUSH  11/27/21 13:08  1 mg





  ONETIME ONE   Administration


 


Metronidazole  2,000 mg  11/27/21 13:55  11/27/21 14:04





  Metronidazole 250 Mg Tab  PO  11/27/21 13:56  2,000 mg





  NOW ONE   Administration


 


Morphine Sulfate  4 mg  11/27/21 13:07  11/27/21 13:25





  Morphine 4 Mg/Ml Vial  IVPUSH  11/27/21 13:08  4 mg





  ONETIME ONE   Administration


 


Ondansetron HCl  4 mg  11/27/21 13:07  11/27/21 13:24





  Ondansetron 4 Mg/2 Ml Sdv  IVPUSH  11/27/21 13:08  4 mg





  ONETIME ONE   Administration


 


Pantoprazole Sodium  40 mg  11/27/21 13:07  11/27/21 14:24





  Pantoprazole 40 Mg/10 Ml Syringe  IVPUSH  11/27/21 13:08  40 mg





  NOW ONE   Administration














- Re-Assessments/Exams


Free Text/Narrative Re-Assessment/Exam: 





11/27/21 13:27


We will get extensive labs and imaging.  Will get hepatitis panel.  Patient does

 not notice any yellowing of her eyes but nurse and myself do note scleral 

icterus.


11/27/21 13:55


Nitrite positive UA; will give a dose of ceftriaxone. +trichomonas, will give 

flagyl 2gm. Labs show hyponatremia, elevated bilirubin to 5, transaminitis. 

Coags normal. Will f/u CT imaging and recommend admission. 


11/27/21 15:11


Discussed case with Dr. Sanchez; he is willing to keep patient in hospital here.

 Patient is agreeable with plan. 





Departure





- Departure


Time of Disposition: 15:12


Disposition: Admitted As Inpatient 68


Condition: Good


Clinical Impression: 


 Hepatitis








- Discharge Information


Referrals: 


PCP,None [Primary Care Provider] - 


Forms:  ED Department Discharge





Sepsis Event Note (ED)





- Evaluation


Sepsis Screening Result: No Definite Risk





- Focused Exam


Vital Signs: 


                                   Vital Signs











  Temp Pulse Resp BP Pulse Ox


 


 11/27/21 15:10   107 H  18  115/68  97


 


 11/27/21 14:27   111 H  18  126/80  94 L


 


 11/27/21 12:52  95.7 F L  133 H  18  144/94 H  98














- My Orders


Last 24 Hours: 


My Active Orders





11/27/21 13:07


Saline Lock Insert [OM.PC] Stat 





11/27/21 13:15


HEPATITIS PANEL (4) [REF] Stat 














- Assessment/Plan


Last 24 Hours: 


My Active Orders





11/27/21 13:07


Saline Lock Insert [OM.PC] Stat 





11/27/21 13:15


HEPATITIS PANEL (4) [REF] Stat

## 2021-11-27 NOTE — CT
INDICATION:



Abdominal pain and jaundice. 



TECHNIQUE:



CT abdomen and pelvis acquired with IV contrast. 100 mL IV Isovue 370. 



COMPARISON:



CT abdomen and pelvis 09/03/2019. 



FINDINGS:



The lower chest is unremarkable. 



There is marked diffuse hypoattenuation the hepatic parenchyma. The liver 

is enlarged measuring 22.5 cm in the midclavicular line. There is a new 1 

cm hyperattenuating lesion in hepatic segment 6 with capsular retraction. 

No cholelithiasis or biliary ductal dilatation. No ascites. 



Normal appearance of the spleen, pancreas, adrenal glands and kidneys. No 

hydronephrosis. No bowel obstruction or acute inflammation. There is a sub 

mucosal fat deposition involving colon similar to the prior exam. Normal 

appendix. No lymphadenopathy in the abdomen or pelvis. No adnexal masses. 



No acute osseous abnormality or suspicious osseous lesion. 



IMPRESSION:



1. Hepatomegaly and marked diffuse hepatic steatosis. 



2. There is a new 1 cm hyperattenuating lesion in the right lobe of the 

liver. This could be focal fatty sparing or a mass. Recommend nonemergent 

dedicated liver MRI for further evaluation.



3. Submucosal fat deposition in the colon can be related to chronic bowel 

inflammation such is from Crohn`s disease or ulcerative colitis. No 

evidence of acute bowel inflammation.



Please note that all CT scans at this facility use dose modulation, 

iterative reconstruction, and/or weight-based dosing when appropriate to 

reduce radiation dose to as low as reasonably achievable.



Dictated by Julia Badillo MD @ 11/27/2021 2:56:08 PM



(Electronically Signed)

## 2021-11-28 LAB
BUN SERPL-MCNC: 11 MG/DL (ref 7–18)
CHLORIDE SERPL-SCNC: 97 MMOL/L (ref 98–107)
CO2 SERPL-SCNC: 22 MMOL/L (ref 21–32)
GLUCOSE SERPL-MCNC: 119 MG/DL (ref 74–106)
POTASSIUM SERPL-SCNC: 4 MMOL/L (ref 3.5–5.1)
SODIUM SERPL-SCNC: 134 MMOL/L (ref 136–145)

## 2021-11-28 RX ADMIN — FOLIC ACID SCH MG: 5 INJECTION, SOLUTION INTRAMUSCULAR; INTRAVENOUS; SUBCUTANEOUS at 13:13

## 2021-11-28 RX ADMIN — METHYLPREDNISOLONE SODIUM SUCCINATE SCH MG: 40 INJECTION, POWDER, FOR SOLUTION INTRAMUSCULAR; INTRAVENOUS at 08:54

## 2021-11-28 RX ADMIN — PANTOPRAZOLE SODIUM SCH MG: 40 INJECTION, POWDER, FOR SOLUTION INTRAVENOUS at 16:42

## 2021-11-28 RX ADMIN — LORAZEPAM PRN MG: 2 INJECTION INTRAMUSCULAR; INTRAVENOUS at 02:10

## 2021-11-28 RX ADMIN — SODIUM CHLORIDE SCH MG: 9 INJECTION, SOLUTION INTRAVENOUS at 13:13

## 2021-11-28 RX ADMIN — METRONIDAZOLE SCH MLS/HR: 500 SOLUTION INTRAVENOUS at 05:14

## 2021-11-28 RX ADMIN — LORAZEPAM PRN MG: 2 INJECTION INTRAMUSCULAR; INTRAVENOUS at 08:54

## 2021-11-28 RX ADMIN — PANTOPRAZOLE SODIUM SCH MG: 40 INJECTION, POWDER, FOR SOLUTION INTRAVENOUS at 05:12

## 2021-11-28 NOTE — PCM.PN
- General Info


Date of Service: 11/28/21





- Review of Systems


Systems Review Comment:: 





feeling better, nausea and abdominal pain improved





- Patient Data


Vitals - Most Recent: 


                                Last Vital Signs











Temp  35.8 C L  11/28/21 08:00


 


Pulse  90   11/28/21 08:00


 


Resp  16   11/28/21 08:00


 


BP  119/68   11/28/21 08:00


 


Pulse Ox  90 L  11/28/21 08:00











Weight - Most Recent: 64.7 kg


I&O - Last 24 Hours: 


                                 Intake & Output











 11/27/21 11/28/21 11/28/21





 22:59 06:59 14:59


 


Intake Total  10 


 


Balance  10 











Lab Results Last 24 Hours: 


                         Laboratory Results - last 24 hr











  11/27/21 11/27/21 11/27/21 Range/Units





  13:00 13:00 13:00 


 


WBC  9.56    (4.0-11.0)  K/uL


 


RBC  4.07 L    (4.30-5.90)  M/uL


 


Hgb  14.6    (12.0-16.0)  g/dL


 


Hct  42.9    (36.0-46.0)  %


 


MCV  105.4 H    (80.0-98.0)  fL


 


MCH  35.9 H    (27.0-32.0)  pg


 


MCHC  34.0    (31.0-37.0)  g/dL


 


RDW Std Deviation  52.8    (28.0-62.0)  fl


 


RDW Coeff of Flo  14    (11.0-15.0)  %


 


Plt Count  122 L    (150-400)  K/uL


 


MPV  11.50    (7.40-12.00)  fL


 


Neut % (Auto)  82.7 H    (48.0-80.0)  %


 


Lymph % (Auto)  9.3 L    (16.0-40.0)  %


 


Mono % (Auto)  7.5    (0.0-15.0)  %


 


Eos % (Auto)  0.2    (0.0-7.0)  %


 


Baso % (Auto)  0.3    (0.0-1.5)  %


 


Neut # (Auto)  7.9 H    (1.4-5.7)  K/uL


 


Lymph # (Auto)  0.9    (0.6-2.4)  K/uL


 


Mono # (Auto)  0.7    (0.0-0.8)  K/uL


 


Eos # (Auto)  0.0    (0.0-0.7)  K/uL


 


Baso # (Auto)  0.0    (0.0-0.1)  K/uL


 


Nucleated RBC %  0.0    /100WBC


 


Nucleated RBCs #  0    K/uL


 


INR     


 


APTT     (18.6-31.3)  SEC


 


Sodium   131 L   (136-145)  mmol/L


 


Potassium   4.2   (3.5-5.1)  mmol/L


 


Chloride   89 L   ()  mmol/L


 


Carbon Dioxide   14.0 L   (21.0-32.0)  mmol/L


 


BUN   15   (7.0-18.0)  mg/dL


 


Creatinine   1.0   (0.6-1.0)  mg/dL


 


Est Cr Clr Drug Dosing   TNP   


 


Estimated GFR (MDRD)   > 60.0   ml/min


 


Glucose   159 H   ()  mg/dL


 


Lactic Acid    1.8  (0.4-2.0)  mmol/L


 


Calcium   12.2 H   (8.5-10.1)  mg/dL


 


Magnesium   1.7 L   (1.8-2.4)  mg/dL


 


Total Bilirubin   5.0 H   (0.2-1.0)  mg/dL


 


AST   614 H   (15-37)  IU/L


 


ALT   206 H   (14-63)  IU/L


 


Alkaline Phosphatase   214 H   ()  U/L


 


Total Protein   10.2 H   (6.4-8.2)  g/dL


 


Albumin   3.3 L   (3.4-5.0)  g/dL


 


Globulin   6.9 H   (2.6-4.0)  g/dL


 


Albumin/Globulin Ratio   0.5 L   (0.9-1.6)  


 


Lipase   254   ()  U/L


 


HCG, Qual     (NEG)  


 


Urine Color     


 


Urine Appearance     


 


Urine pH     (5.0-8.0)  


 


Ur Specific Gravity     (1.001-1.035)  


 


Urine Protein     (NEGATIVE)  mg/dL


 


Urine Glucose (UA)     (NEGATIVE)  mg/dL


 


Urine Ketones     (NEGATIVE)  mg/dL


 


Urine Occult Blood     (NEGATIVE)  


 


Urine Nitrite     (NEGATIVE)  


 


Urine Bilirubin     (NEGATIVE)  


 


Urine Urobilinogen     (<2.0)  EU/dL


 


Ur Leukocyte Esterase     (NEGATIVE)  


 


U Hyaline Cast (Auto)     (0-2/LPF)  


 


Urine RBC     (0-2/HPF)  


 


Urine WBC     (0-5/HPF)  


 


Ur Epithelial Cells     (NONE-FEW)  


 


Urine Bacteria     (NEGATIVE)  


 


Urine Trichomonas     (NEGATIVE)  


 


Ethyl Alcohol   <3   mg/dL


 


SARS-CoV-2 RNA (MIGUELANGEL)     (NEGATIVE)  














  11/27/21 11/27/21 11/27/21 Range/Units





  13:00 13:00 13:15 


 


WBC     (4.0-11.0)  K/uL


 


RBC     (4.30-5.90)  M/uL


 


Hgb     (12.0-16.0)  g/dL


 


Hct     (36.0-46.0)  %


 


MCV     (80.0-98.0)  fL


 


MCH     (27.0-32.0)  pg


 


MCHC     (31.0-37.0)  g/dL


 


RDW Std Deviation     (28.0-62.0)  fl


 


RDW Coeff of Flo     (11.0-15.0)  %


 


Plt Count     (150-400)  K/uL


 


MPV     (7.40-12.00)  fL


 


Neut % (Auto)     (48.0-80.0)  %


 


Lymph % (Auto)     (16.0-40.0)  %


 


Mono % (Auto)     (0.0-15.0)  %


 


Eos % (Auto)     (0.0-7.0)  %


 


Baso % (Auto)     (0.0-1.5)  %


 


Neut # (Auto)     (1.4-5.7)  K/uL


 


Lymph # (Auto)     (0.6-2.4)  K/uL


 


Mono # (Auto)     (0.0-0.8)  K/uL


 


Eos # (Auto)     (0.0-0.7)  K/uL


 


Baso # (Auto)     (0.0-0.1)  K/uL


 


Nucleated RBC %     /100WBC


 


Nucleated RBCs #     K/uL


 


INR  1.11    


 


APTT  24.3    (18.6-31.3)  SEC


 


Sodium     (136-145)  mmol/L


 


Potassium     (3.5-5.1)  mmol/L


 


Chloride     ()  mmol/L


 


Carbon Dioxide     (21.0-32.0)  mmol/L


 


BUN     (7.0-18.0)  mg/dL


 


Creatinine     (0.6-1.0)  mg/dL


 


Est Cr Clr Drug Dosing     


 


Estimated GFR (MDRD)     ml/min


 


Glucose     ()  mg/dL


 


Lactic Acid     (0.4-2.0)  mmol/L


 


Calcium     (8.5-10.1)  mg/dL


 


Magnesium     (1.8-2.4)  mg/dL


 


Total Bilirubin     (0.2-1.0)  mg/dL


 


AST     (15-37)  IU/L


 


ALT     (14-63)  IU/L


 


Alkaline Phosphatase     ()  U/L


 


Total Protein     (6.4-8.2)  g/dL


 


Albumin     (3.4-5.0)  g/dL


 


Globulin     (2.6-4.0)  g/dL


 


Albumin/Globulin Ratio     (0.9-1.6)  


 


Lipase     ()  U/L


 


HCG, Qual   NEGATIVE   (NEG)  


 


Urine Color     


 


Urine Appearance     


 


Urine pH     (5.0-8.0)  


 


Ur Specific Gravity     (1.001-1.035)  


 


Urine Protein     (NEGATIVE)  mg/dL


 


Urine Glucose (UA)     (NEGATIVE)  mg/dL


 


Urine Ketones     (NEGATIVE)  mg/dL


 


Urine Occult Blood     (NEGATIVE)  


 


Urine Nitrite     (NEGATIVE)  


 


Urine Bilirubin     (NEGATIVE)  


 


Urine Urobilinogen     (<2.0)  EU/dL


 


Ur Leukocyte Esterase     (NEGATIVE)  


 


U Hyaline Cast (Auto)     (0-2/LPF)  


 


Urine RBC     (0-2/HPF)  


 


Urine WBC     (0-5/HPF)  


 


Ur Epithelial Cells     (NONE-FEW)  


 


Urine Bacteria     (NEGATIVE)  


 


Urine Trichomonas     (NEGATIVE)  


 


Ethyl Alcohol     mg/dL


 


SARS-CoV-2 RNA (MIGUELANGEL)    NEGATIVE  (NEGATIVE)  














  11/27/21 11/28/21 11/28/21 Range/Units





  13:27 06:16 06:16 


 


WBC   6.12   (4.0-11.0)  K/uL


 


RBC   3.04 L   (4.30-5.90)  M/uL


 


Hgb   10.7 L   (12.0-16.0)  g/dL


 


Hct   32.0 L   (36.0-46.0)  %


 


MCV   105.3 H   (80.0-98.0)  fL


 


MCH   35.2 H   (27.0-32.0)  pg


 


MCHC   33.4   (31.0-37.0)  g/dL


 


RDW Std Deviation   52.0   (28.0-62.0)  fl


 


RDW Coeff of Flo   14   (11.0-15.0)  %


 


Plt Count   84 L   (150-400)  K/uL


 


MPV   12.30 H   (7.40-12.00)  fL


 


Neut % (Auto)   79.1   (48.0-80.0)  %


 


Lymph % (Auto)   12.7 L   (16.0-40.0)  %


 


Mono % (Auto)   8.2   (0.0-15.0)  %


 


Eos % (Auto)   0.0   (0.0-7.0)  %


 


Baso % (Auto)   0.0   (0.0-1.5)  %


 


Neut # (Auto)   4.8   (1.4-5.7)  K/uL


 


Lymph # (Auto)   0.8   (0.6-2.4)  K/uL


 


Mono # (Auto)   0.5   (0.0-0.8)  K/uL


 


Eos # (Auto)   0.0   (0.0-0.7)  K/uL


 


Baso # (Auto)   0.0   (0.0-0.1)  K/uL


 


Nucleated RBC %   0.6   /100WBC


 


Nucleated RBCs #   0   K/uL


 


INR     


 


APTT     (18.6-31.3)  SEC


 


Sodium    134 L  (136-145)  mmol/L


 


Potassium    4.0  (3.5-5.1)  mmol/L


 


Chloride    97 L  ()  mmol/L


 


Carbon Dioxide    22.0  (21.0-32.0)  mmol/L


 


BUN    11  (7.0-18.0)  mg/dL


 


Creatinine    0.7  (0.6-1.0)  mg/dL


 


Est Cr Clr Drug Dosing    97.79  


 


Estimated GFR (MDRD)    > 60.0  ml/min


 


Glucose    119 H  ()  mg/dL


 


Lactic Acid     (0.4-2.0)  mmol/L


 


Calcium    9.0  (8.5-10.1)  mg/dL


 


Magnesium    2.0  (1.8-2.4)  mg/dL


 


Total Bilirubin    2.6 H  (0.2-1.0)  mg/dL


 


AST    339 H  (15-37)  IU/L


 


ALT    124 H  (14-63)  IU/L


 


Alkaline Phosphatase    143 H  ()  U/L


 


Total Protein    7.7  (6.4-8.2)  g/dL


 


Albumin    2.5 L  (3.4-5.0)  g/dL


 


Globulin    5.2 H  (2.6-4.0)  g/dL


 


Albumin/Globulin Ratio    0.5 L  (0.9-1.6)  


 


Lipase     ()  U/L


 


HCG, Qual     (NEG)  


 


Urine Color  ORANGE    


 


Urine Appearance  SLT CLOUDY    


 


Urine pH  6.0    (5.0-8.0)  


 


Ur Specific Gravity  >= 1.030    (1.001-1.035)  


 


Urine Protein  >=300 H    (NEGATIVE)  mg/dL


 


Urine Glucose (UA)  NEGATIVE    (NEGATIVE)  mg/dL


 


Urine Ketones  >=80    (NEGATIVE)  mg/dL


 


Urine Occult Blood  SMALL H    (NEGATIVE)  


 


Urine Nitrite  POSITIVE H    (NEGATIVE)  


 


Urine Bilirubin  LARGE H    (NEGATIVE)  


 


Urine Urobilinogen  2.0 H    (<2.0)  EU/dL


 


Ur Leukocyte Esterase  TRACE H    (NEGATIVE)  


 


U Hyaline Cast (Auto)  0-3    (0-2/LPF)  


 


Urine RBC  0-2    (0-2/HPF)  


 


Urine WBC  2-4    (0-5/HPF)  


 


Ur Epithelial Cells  MODERATE    (NONE-FEW)  


 


Urine Bacteria  1+ H    (NEGATIVE)  


 


Urine Trichomonas  PRESENT    (NEGATIVE)  


 


Ethyl Alcohol     mg/dL


 


SARS-CoV-2 RNA (MIGUELANGEL)     (NEGATIVE)  











Med Orders - Current: 


                               Current Medications





Hydromorphone HCl (Hydromorphone 4 Mg/Ml Syringe)  1 mg IVPUSH Q3H PRN


   PRN Reason: Pain (moderate 4-6)


Sodium Chloride (Normal Saline)  1,000 mls @ 150 mls/hr IV ASDIRECTED [facility]


   Last Admin: 11/28/21 05:14 Dose:  150 mls/hr


   Documented by: 


Metronidazole 500 mg/ Premix  100 mls @ 100 mls/hr IV Q8H [facility]


   Last Admin: 11/28/21 05:14 Dose:  100 mls/hr


   Documented by: 


Ceftriaxone Sodium/Dextrose 2 (gm/ Premix)  50 mls @ 100 mls/hr IV Q24H [facility]


Lorazepam (Lorazepam 2 Mg/Ml Sdv)  0 mg IV Q4H PRN; Protocol


   PRN Reason: Withdrawal Symptoms


   Last Admin: 11/28/21 08:54 Dose:  1 mg


   Documented by: 


Ondansetron HCl (Ondansetron 4 Mg/2 Ml Sdv)  4 mg IVPUSH Q4H PRN


   PRN Reason: Vomiting


   Last Admin: 11/27/21 20:48 Dose:  4 mg


   Documented by: 


Pantoprazole Sodium (Pantoprazole 40 Mg/10 Ml Syringe)  40 mg IVPUSH Q12H [facility]


   Last Admin: 11/28/21 05:12 Dose:  40 mg


   Documented by: 





Discontinued Medications





Chlordiazepoxide HCl (Chlordiazepoxide 25 Mg Cap)  50 mg PO Q6H PRN


   PRN Reason: Withdrawal Symptoms


Sodium Chloride (Normal Saline)  1,000 mls @ 999 mls/hr IV .Bolus ONE


   Stop: 11/27/21 14:07


   Last Admin: 11/27/21 13:23 Dose:  999 mls/hr


   Documented by: 


Ceftriaxone Sodium/Dextrose 1 (gm/ Premix)  50 mls @ 100 mls/hr IV ONETIME ONE


   Stop: 11/27/21 14:12


   Last Admin: 11/27/21 14:04 Dose:  100 mls/hr


   Documented by: 


Sodium Chloride (Normal Saline)  1,000 mls @ 125 mls/hr IV .Bolus ONE


   Stop: 11/27/21 23:39


   Last Admin: 11/27/21 18:01 Dose:  Not Given


   Documented by: 


Ceftriaxone Sodium/Dextrose 2 (gm/ Premix)  50 mls @ 100 mls/hr IV Q24H [facility]


   Last Admin: 11/27/21 20:14 Dose:  Not Given


   Documented by: 


Metronidazole 500 mg/ Premix  100 mls @ 100 mls/hr IV Q8H [facility]


   Last Admin: 11/27/21 20:14 Dose:  Not Given


   Documented by: 


Magnesium Sulfate 2 gm/ Premix  50 mls @ 12.5 mls/hr IV ONETIME ONE


   Stop: 11/27/21 20:16


   Last Admin: 11/27/21 18:15 Dose:  12.5 mls/hr


   Documented by: 


Lorazepam (Lorazepam 2 Mg/Ml Sdv)  1 mg IVPUSH ONETIME ONE


   Stop: 11/27/21 13:08


   Last Admin: 11/27/21 13:24 Dose:  1 mg


   Documented by: 


Methylprednisolone Sodium Succinate (Methylprednisolone Sodium Succinate 40 Mg/1

Ml Sdv)  60 mg IVPUSH DAILY [facility]


   Last Admin: 11/28/21 08:54 Dose:  60 mg


   Documented by: 


Metronidazole (Metronidazole 250 Mg Tab)  2,000 mg PO NOW ONE


   Stop: 11/27/21 13:56


   Last Admin: 11/27/21 14:04 Dose:  2,000 mg


   Documented by: 


Morphine Sulfate (Morphine 4 Mg/Ml Vial)  4 mg IVPUSH ONETIME ONE


   Stop: 11/27/21 13:08


   Last Admin: 11/27/21 13:25 Dose:  4 mg


   Documented by: 


Ondansetron HCl (Ondansetron 4 Mg/2 Ml Sdv)  4 mg IVPUSH ONETIME ONE


   Stop: 11/27/21 13:08


   Last Admin: 11/27/21 13:24 Dose:  4 mg


   Documented by: 


Pantoprazole Sodium (Pantoprazole 40 Mg/10 Ml Syringe)  40 mg IVPUSH NOW ONE


   Stop: 11/27/21 13:08


   Last Admin: 11/27/21 14:24 Dose:  40 mg


   Documented by: 











- Exam


General: Alert, Oriented


Neck: Supple


Lungs: Clear to Auscultation, Normal Respiratory Effort


Cardiovascular: Regular Rate, Regular Rhythm


GI/Abdominal Exam: Soft, Non-Tender, No Distention


Extremities: Non-Tender, No Pedal Edema


Skin: Warm, Dry, Intact





- Patient Data


Lab Results Last 24 hrs: 


                         Laboratory Results - last 24 hr











  11/27/21 11/27/21 11/27/21 Range/Units





  13:00 13:00 13:00 


 


WBC  9.56    (4.0-11.0)  K/uL


 


RBC  4.07 L    (4.30-5.90)  M/uL


 


Hgb  14.6    (12.0-16.0)  g/dL


 


Hct  42.9    (36.0-46.0)  %


 


MCV  105.4 H    (80.0-98.0)  fL


 


MCH  35.9 H    (27.0-32.0)  pg


 


MCHC  34.0    (31.0-37.0)  g/dL


 


RDW Std Deviation  52.8    (28.0-62.0)  fl


 


RDW Coeff of Flo  14    (11.0-15.0)  %


 


Plt Count  122 L    (150-400)  K/uL


 


MPV  11.50    (7.40-12.00)  fL


 


Neut % (Auto)  82.7 H    (48.0-80.0)  %


 


Lymph % (Auto)  9.3 L    (16.0-40.0)  %


 


Mono % (Auto)  7.5    (0.0-15.0)  %


 


Eos % (Auto)  0.2    (0.0-7.0)  %


 


Baso % (Auto)  0.3    (0.0-1.5)  %


 


Neut # (Auto)  7.9 H    (1.4-5.7)  K/uL


 


Lymph # (Auto)  0.9    (0.6-2.4)  K/uL


 


Mono # (Auto)  0.7    (0.0-0.8)  K/uL


 


Eos # (Auto)  0.0    (0.0-0.7)  K/uL


 


Baso # (Auto)  0.0    (0.0-0.1)  K/uL


 


Nucleated RBC %  0.0    /100WBC


 


Nucleated RBCs #  0    K/uL


 


INR     


 


APTT     (18.6-31.3)  SEC


 


Sodium   131 L   (136-145)  mmol/L


 


Potassium   4.2   (3.5-5.1)  mmol/L


 


Chloride   89 L   ()  mmol/L


 


Carbon Dioxide   14.0 L   (21.0-32.0)  mmol/L


 


BUN   15   (7.0-18.0)  mg/dL


 


Creatinine   1.0   (0.6-1.0)  mg/dL


 


Est Cr Clr Drug Dosing   TNP   


 


Estimated GFR (MDRD)   > 60.0   ml/min


 


Glucose   159 H   ()  mg/dL


 


Lactic Acid    1.8  (0.4-2.0)  mmol/L


 


Calcium   12.2 H   (8.5-10.1)  mg/dL


 


Magnesium   1.7 L   (1.8-2.4)  mg/dL


 


Total Bilirubin   5.0 H   (0.2-1.0)  mg/dL


 


AST   614 H   (15-37)  IU/L


 


ALT   206 H   (14-63)  IU/L


 


Alkaline Phosphatase   214 H   ()  U/L


 


Total Protein   10.2 H   (6.4-8.2)  g/dL


 


Albumin   3.3 L   (3.4-5.0)  g/dL


 


Globulin   6.9 H   (2.6-4.0)  g/dL


 


Albumin/Globulin Ratio   0.5 L   (0.9-1.6)  


 


Lipase   254   ()  U/L


 


HCG, Qual     (NEG)  


 


Urine Color     


 


Urine Appearance     


 


Urine pH     (5.0-8.0)  


 


Ur Specific Gravity     (1.001-1.035)  


 


Urine Protein     (NEGATIVE)  mg/dL


 


Urine Glucose (UA)     (NEGATIVE)  mg/dL


 


Urine Ketones     (NEGATIVE)  mg/dL


 


Urine Occult Blood     (NEGATIVE)  


 


Urine Nitrite     (NEGATIVE)  


 


Urine Bilirubin     (NEGATIVE)  


 


Urine Urobilinogen     (<2.0)  EU/dL


 


Ur Leukocyte Esterase     (NEGATIVE)  


 


U Hyaline Cast (Auto)     (0-2/LPF)  


 


Urine RBC     (0-2/HPF)  


 


Urine WBC     (0-5/HPF)  


 


Ur Epithelial Cells     (NONE-FEW)  


 


Urine Bacteria     (NEGATIVE)  


 


Urine Trichomonas     (NEGATIVE)  


 


Ethyl Alcohol   <3   mg/dL


 


SARS-CoV-2 RNA (MIGUELANGEL)     (NEGATIVE)  














  11/27/21 11/27/21 11/27/21 Range/Units





  13:00 13:00 13:15 


 


WBC     (4.0-11.0)  K/uL


 


RBC     (4.30-5.90)  M/uL


 


Hgb     (12.0-16.0)  g/dL


 


Hct     (36.0-46.0)  %


 


MCV     (80.0-98.0)  fL


 


MCH     (27.0-32.0)  pg


 


MCHC     (31.0-37.0)  g/dL


 


RDW Std Deviation     (28.0-62.0)  fl


 


RDW Coeff of Flo     (11.0-15.0)  %


 


Plt Count     (150-400)  K/uL


 


MPV     (7.40-12.00)  fL


 


Neut % (Auto)     (48.0-80.0)  %


 


Lymph % (Auto)     (16.0-40.0)  %


 


Mono % (Auto)     (0.0-15.0)  %


 


Eos % (Auto)     (0.0-7.0)  %


 


Baso % (Auto)     (0.0-1.5)  %


 


Neut # (Auto)     (1.4-5.7)  K/uL


 


Lymph # (Auto)     (0.6-2.4)  K/uL


 


Mono # (Auto)     (0.0-0.8)  K/uL


 


Eos # (Auto)     (0.0-0.7)  K/uL


 


Baso # (Auto)     (0.0-0.1)  K/uL


 


Nucleated RBC %     /100WBC


 


Nucleated RBCs #     K/uL


 


INR  1.11    


 


APTT  24.3    (18.6-31.3)  SEC


 


Sodium     (136-145)  mmol/L


 


Potassium     (3.5-5.1)  mmol/L


 


Chloride     ()  mmol/L


 


Carbon Dioxide     (21.0-32.0)  mmol/L


 


BUN     (7.0-18.0)  mg/dL


 


Creatinine     (0.6-1.0)  mg/dL


 


Est Cr Clr Drug Dosing     


 


Estimated GFR (MDRD)     ml/min


 


Glucose     ()  mg/dL


 


Lactic Acid     (0.4-2.0)  mmol/L


 


Calcium     (8.5-10.1)  mg/dL


 


Magnesium     (1.8-2.4)  mg/dL


 


Total Bilirubin     (0.2-1.0)  mg/dL


 


AST     (15-37)  IU/L


 


ALT     (14-63)  IU/L


 


Alkaline Phosphatase     ()  U/L


 


Total Protein     (6.4-8.2)  g/dL


 


Albumin     (3.4-5.0)  g/dL


 


Globulin     (2.6-4.0)  g/dL


 


Albumin/Globulin Ratio     (0.9-1.6)  


 


Lipase     ()  U/L


 


HCG, Qual   NEGATIVE   (NEG)  


 


Urine Color     


 


Urine Appearance     


 


Urine pH     (5.0-8.0)  


 


Ur Specific Gravity     (1.001-1.035)  


 


Urine Protein     (NEGATIVE)  mg/dL


 


Urine Glucose (UA)     (NEGATIVE)  mg/dL


 


Urine Ketones     (NEGATIVE)  mg/dL


 


Urine Occult Blood     (NEGATIVE)  


 


Urine Nitrite     (NEGATIVE)  


 


Urine Bilirubin     (NEGATIVE)  


 


Urine Urobilinogen     (<2.0)  EU/dL


 


Ur Leukocyte Esterase     (NEGATIVE)  


 


U Hyaline Cast (Auto)     (0-2/LPF)  


 


Urine RBC     (0-2/HPF)  


 


Urine WBC     (0-5/HPF)  


 


Ur Epithelial Cells     (NONE-FEW)  


 


Urine Bacteria     (NEGATIVE)  


 


Urine Trichomonas     (NEGATIVE)  


 


Ethyl Alcohol     mg/dL


 


SARS-CoV-2 RNA (MIGUELANGEL)    NEGATIVE  (NEGATIVE)  














  11/27/21 11/28/21 11/28/21 Range/Units





  13:27 06:16 06:16 


 


WBC   6.12   (4.0-11.0)  K/uL


 


RBC   3.04 L   (4.30-5.90)  M/uL


 


Hgb   10.7 L   (12.0-16.0)  g/dL


 


Hct   32.0 L   (36.0-46.0)  %


 


MCV   105.3 H   (80.0-98.0)  fL


 


MCH   35.2 H   (27.0-32.0)  pg


 


MCHC   33.4   (31.0-37.0)  g/dL


 


RDW Std Deviation   52.0   (28.0-62.0)  fl


 


RDW Coeff of Flo   14   (11.0-15.0)  %


 


Plt Count   84 L   (150-400)  K/uL


 


MPV   12.30 H   (7.40-12.00)  fL


 


Neut % (Auto)   79.1   (48.0-80.0)  %


 


Lymph % (Auto)   12.7 L   (16.0-40.0)  %


 


Mono % (Auto)   8.2   (0.0-15.0)  %


 


Eos % (Auto)   0.0   (0.0-7.0)  %


 


Baso % (Auto)   0.0   (0.0-1.5)  %


 


Neut # (Auto)   4.8   (1.4-5.7)  K/uL


 


Lymph # (Auto)   0.8   (0.6-2.4)  K/uL


 


Mono # (Auto)   0.5   (0.0-0.8)  K/uL


 


Eos # (Auto)   0.0   (0.0-0.7)  K/uL


 


Baso # (Auto)   0.0   (0.0-0.1)  K/uL


 


Nucleated RBC %   0.6   /100WBC


 


Nucleated RBCs #   0   K/uL


 


INR     


 


APTT     (18.6-31.3)  SEC


 


Sodium    134 L  (136-145)  mmol/L


 


Potassium    4.0  (3.5-5.1)  mmol/L


 


Chloride    97 L  ()  mmol/L


 


Carbon Dioxide    22.0  (21.0-32.0)  mmol/L


 


BUN    11  (7.0-18.0)  mg/dL


 


Creatinine    0.7  (0.6-1.0)  mg/dL


 


Est Cr Clr Drug Dosing    97.79  


 


Estimated GFR (MDRD)    > 60.0  ml/min


 


Glucose    119 H  ()  mg/dL


 


Lactic Acid     (0.4-2.0)  mmol/L


 


Calcium    9.0  (8.5-10.1)  mg/dL


 


Magnesium    2.0  (1.8-2.4)  mg/dL


 


Total Bilirubin    2.6 H  (0.2-1.0)  mg/dL


 


AST    339 H  (15-37)  IU/L


 


ALT    124 H  (14-63)  IU/L


 


Alkaline Phosphatase    143 H  ()  U/L


 


Total Protein    7.7  (6.4-8.2)  g/dL


 


Albumin    2.5 L  (3.4-5.0)  g/dL


 


Globulin    5.2 H  (2.6-4.0)  g/dL


 


Albumin/Globulin Ratio    0.5 L  (0.9-1.6)  


 


Lipase     ()  U/L


 


HCG, Qual     (NEG)  


 


Urine Color  ORANGE    


 


Urine Appearance  SLT CLOUDY    


 


Urine pH  6.0    (5.0-8.0)  


 


Ur Specific Gravity  >= 1.030    (1.001-1.035)  


 


Urine Protein  >=300 H    (NEGATIVE)  mg/dL


 


Urine Glucose (UA)  NEGATIVE    (NEGATIVE)  mg/dL


 


Urine Ketones  >=80    (NEGATIVE)  mg/dL


 


Urine Occult Blood  SMALL H    (NEGATIVE)  


 


Urine Nitrite  POSITIVE H    (NEGATIVE)  


 


Urine Bilirubin  LARGE H    (NEGATIVE)  


 


Urine Urobilinogen  2.0 H    (<2.0)  EU/dL


 


Ur Leukocyte Esterase  TRACE H    (NEGATIVE)  


 


U Hyaline Cast (Auto)  0-3    (0-2/LPF)  


 


Urine RBC  0-2    (0-2/HPF)  


 


Urine WBC  2-4    (0-5/HPF)  


 


Ur Epithelial Cells  MODERATE    (NONE-FEW)  


 


Urine Bacteria  1+ H    (NEGATIVE)  


 


Urine Trichomonas  PRESENT    (NEGATIVE)  


 


Ethyl Alcohol     mg/dL


 


SARS-CoV-2 RNA (MIGUELANGEL)     (NEGATIVE)  











Result Diagrams: 


                                 11/28/21 06:16





                                 11/28/21 06:16





Sepsis Event Note





- Evaluation


Sepsis Screening Result: No Definite Risk





- Focused Exam


Vital Signs: 


                                   Vital Signs











  Temp Pulse Resp BP Pulse Ox


 


 11/28/21 08:00  35.8 C L  90  16  119/68  90 L


 


 11/28/21 04:00  36.3 C  95  18  116/73  95














- Problem List & Annotations


(1) Alcoholic hepatitis


SNOMED Code(s): 717889925


   Code(s): K70.10 - ALCOHOLIC HEPATITIS WITHOUT ASCITES   Status: Acute   

Current Visit: Yes   





(2) UTI (urinary tract infection)


SNOMED Code(s): 87799656


   Code(s): N39.0 - URINARY TRACT INFECTION, SITE NOT SPECIFIED   Status: Acute 

 Current Visit: Yes   





(3) Acute alcohol intoxication


SNOMED Code(s): 01568354, 50210994


   Code(s): F10.929 - ALCOHOL USE, UNSPECIFIED WITH INTOXICATION, UNSPECIFIED   

Status: Acute   Current Visit: Yes   





- Problem List Review


Problem List Initiated/Reviewed/Updated: Yes





- My Orders


Last 24 Hours: 


My Active Orders





11/28/21 Breakfast


Regular Diet [DIET] 





11/28/21 10:47


Abdomen Ltd [US] Routine 





11/28/21 12:22


Abdomen Ltd [US] Stat 














- Assessment


Assessment:: 


37 yo female admitted with alcoholic hepatitis, gastritis, ETOH withdrawal, 





1. Gastritis


continue IV fluids, protonix,  Hgb dropped but likely dilutional as no signs or 

symptoms of bleeding.  Will give thiamin and folic acid





2. ETOH Heptatitis


will check liver ultrasound,  bilirubin is improving,  Will need outpatient MRI 

to follow up CT scan findings of liver lesion





2. Alcohol withdrawal symptoms


[facility] protocol with prn Ativan


Thiamin and folic acid





3. Ulcerative colitis


Likely not a flare as patient has no diarrhea and symptoms are more likely due 

to gastritis.  Patient will need outpatient colonoscopy as she has never had 

one. it had been recommended in the past for abnormal bowel movements to work up

 for possible inflammatory bowel disease.  Will stop steroids and flagyl





4. Urinary tract infection


continue Rocephin

## 2021-11-28 NOTE — US
CLINICAL HISTORY:



 Elevated bili 



Comparison CT abdomen pelvis 11/27/2020



FINDINGS:



Liver enlarged measuring 22.5 cm. Increased echogenicity of the liver. The 

lesion seen by CT imaging is visualized on this study. There is a normal 

appearance of the hepatic IVC and proximal abdominal aorta. There is no 

evidence of ascites. The gallbladder is of normal size and there is no 

evidence of intraluminal stones or sludge.  The gallbladder wall measures 1 

mm in thickness.  The common bile duct is of normal size and measures  5 mm 

in diameter at the level of the ramona hepatis.  The pancreas poorly seen 

there is no evidence of a stone or hydronephrosis within the right kidney.  

The right kidney measures 13 cm in length.  



IMPRESSION:



Hepatomegaly with hepatic steatosis. Common bile duct measures 5 

millimeters.



Dictated by Caridad Dooley MD @ 11/28/2021 2:53:01 PM



(Electronically Signed)

## 2021-11-29 VITALS — SYSTOLIC BLOOD PRESSURE: 112 MMHG | HEART RATE: 85 BPM | DIASTOLIC BLOOD PRESSURE: 85 MMHG

## 2021-11-29 LAB
BUN SERPL-MCNC: 10 MG/DL (ref 7–18)
CHLORIDE SERPL-SCNC: 102 MMOL/L (ref 98–107)
CO2 SERPL-SCNC: 27.5 MMOL/L (ref 21–32)
GLUCOSE SERPL-MCNC: 95 MG/DL (ref 74–106)
POTASSIUM SERPL-SCNC: 3.2 MMOL/L (ref 3.5–5.1)
SODIUM SERPL-SCNC: 137 MMOL/L (ref 136–145)

## 2021-11-29 RX ADMIN — SODIUM CHLORIDE SCH MG: 9 INJECTION, SOLUTION INTRAVENOUS at 08:19

## 2021-11-29 RX ADMIN — LORAZEPAM PRN MG: 2 INJECTION INTRAMUSCULAR; INTRAVENOUS at 04:45

## 2021-11-29 RX ADMIN — PANTOPRAZOLE SODIUM SCH MG: 40 INJECTION, POWDER, FOR SOLUTION INTRAVENOUS at 04:37

## 2021-11-29 RX ADMIN — FOLIC ACID SCH MG: 5 INJECTION, SOLUTION INTRAMUSCULAR; INTRAVENOUS; SUBCUTANEOUS at 08:19

## 2021-11-29 NOTE — [id number]
**Discharge Summary





- Hospital Course


Free Text/Narrative:: 


The patient is a 36-year-old  female, on day 3 of service, with a 

significant past medical history of hypertension, asthma, alcohol abuse, alcohol

withdrawal seizures and symptoms, gastritis, anxiety, depression, and ulcerative

colitis, who was admitted to the medical floor due to gastritis, alcoholic 

hepatitis, alcohol withdrawal symptoms, inflammatory bowel disease, and UTI.  

While in hospital the patient was treated for these different conditions with 

various medications.  For her gastritis she was treated with Protonix 40 mg 

every 12 hours in order to decrease stomach lining inflammation, she will also 

receive a prescription of pantoprazole 40 mg for a 14-day course to be taken 

twice daily after discharge.  For her alcoholic hepatitis the patient had an 

ultrasound ordered, received thiamine and folate, and on an outpatient basis 

will have an MRI done.  She was advised that due to her liver damage, on 

outpatient basis she would have to consume larger meals in order for 

reparations.  For her alcohol withdrawal, the patient was on CIWA protocol while

in hospital and also received Librium, on an outpatient basis she will be given 

a prescription of Ativan in order to help alleviate the symptoms and in order to

promote sleep.  For her hypokalemia, the patient received a dose of potassium 

chloride today 40 mEq, and was advised to eat foods rich in potassium upon 

discharge including bananas.  





For her inflammatory bowel disease, more specifically ulcerative colitis, the 

patient will follow up with surgeon Dr. Wellington for a colonoscopy.  In regards to

her UTI she was treated with Rocephin while in hospital, and will be sent home 

with an oral prescription of cefdinir.  The patient is to follow-up with Dr. Ajit oneill her new PCP in 1 to 2 weeks time.  She has also been educated on being 

compliant with her medication and taking them at scheduled times.  She has been 

counseled to return to the hospital if she has increasing abdominal pain, 

hematemesis, chest pain, palpitations, or bloody stools.  She has also been 

advised to pursue alcohol rehabilitation treatments including Alcoholics 

Anonymous.  The patient is now stable and can be discharged safely.








- Discharge Data


Discharge Date: 11/29/21


Discharge Disposition: Home, Self-Care 01


Condition: Stable





- Referral to Home Health


Primary Care Physician: 


PCP None








- Discharge Diagnosis/Problem(s)


(1) UTI (urinary tract infection)


SNOMED Code(s): 97177470


   ICD Code: N39.0 - URINARY TRACT INFECTION, SITE NOT SPECIFIED   Status: Acute

  Current Visit: Yes   





(2) Gastritis


SNOMED Code(s): 6928660


   ICD Code: K29.70 - GASTRITIS, UNSPECIFIED, WITHOUT BLEEDING   Status: Acute  

Current Visit: Yes   





(3) IBD (inflammatory bowel disease)


SNOMED Code(s): 76355293


   ICD Code: K52.9 - NONINFECTIVE GASTROENTERITIS AND COLITIS, UNSPECIFIED   

Status: Acute   Current Visit: Yes   





(4) Anxiety


SNOMED Code(s): 87555657


   ICD Code: F41.9 - ANXIETY DISORDER, UNSPECIFIED   Status: Acute   Current 

Visit: No   





(5) High blood pressure


SNOMED Code(s): 18889538


   ICD Code: I10 - ESSENTIAL (PRIMARY) HYPERTENSION   Status: Acute   Current 

Visit: No   


Qualifiers: 


   Hypertension type: other secondary hypertension 





(6) Alcohol use


SNOMED Code(s): 323751


   ICD Code: Z78.9 - OTHER SPECIFIED HEALTH STATUS   Status: Acute   Current 

Visit: No   





(7) Alcohol withdrawal


SNOMED Code(s): 017564404


   ICD Code: F10.239 - ALCOHOL DEPENDENCE WITH WITHDRAWAL, UNSPECIFIED   Status:

Acute   Current Visit: No   


Qualifiers: 


   Complication of substance-induced condition: uncomplicated   Qualified 

Code(s): F10.230 - Alcohol dependence with withdrawal, uncomplicated   





(8) Hypomagnesemia


SNOMED Code(s): 724546847


   ICD Code: E83.42 - HYPOMAGNESEMIA   Status: Acute   Current Visit: No   





- Patient Instructions


Diet: Regular Diet as Tolerated


Activity: As Tolerated


Showering/Bathing: May Shower


Other/Special Instructions: -Return to the hospital if you have abdominal pain, 

chest pain, bloody vomiting, palpitations, withdrawal symptoms.  -Take your 

medication at scheduled times.  -Follow-up with your PCP Dr. Fong.  -Follow-up 

with surgeon Dr. Wellington to discuss colonoscopy





- Discharge Plan


Prescriptions/Med Rec: 


Cefdinir 300 mg PO TID #12 capsule


Pantoprazole [ProTONIX***] 40 mg PO BID 14 Days #28 tab.cr


Home Medications: 


                                    Home Meds





Cefdinir 300 mg PO TID #12 capsule 11/29/21 [Rx]


Pantoprazole [ProTONIX***] 40 mg PO BID 14 Days #28 tab.cr 11/29/21 [Rx]








Patient Handouts:  Alcoholic Liver Disease, Easy-to-Read, Gastritis, Adult, 

Easy-to-Read, Cefdinir Capsules, Food Choices to Help Relieve Diarrhea, Adult, 

Alcohol Intoxication, Easy-to-Read, Urinary Tract Infection, Adult, 

Easy-to-Read, Lorazepam tablets, Diarrhea, Adult, Easy-to-Read


Referrals: 


Anuradha Wellington MD [Physician] -  (We are establishing a consultation for an 

outpatient colonscopy.)


Pallavi Fong MD [Resident] - 





- Discharge Summary/Plan Comment


DC Time >30 min.: Yes


Total # of Minutes for Discharge Time: 


35 minutes








- Review of Systems


General: Denies: Fever, Weakness


HEENT: Denies: Headaches, Sore Throat


Pulmonary: Denies: Shortness of Breath, Cough


Cardiovascular: Denies: Chest Pain, Palpitations


Gastrointestinal: Denies: Abdominal Pain


Genitourinary: Denies: Dysuria





- Patient Data


Vitals - Most Recent: 


                                Last Vital Signs











Temp  97.2 F   11/29/21 08:00


 


Pulse  82   11/29/21 08:00


 


Resp  16   11/29/21 08:00


 


BP  113/82   11/29/21 08:00


 


Pulse Ox  96   11/29/21 08:00











Weight - Most Recent: 142 lb 10.225 oz


I&O - Last 24 hours: 


                                 Intake & Output











 11/28/21 11/29/21 11/29/21





 22:59 06:59 14:59


 


Intake Total 2300 720 


 


Output Total  1150 


 


Balance 2300 -430 











Lab Results - Last 24 hrs: 


                         Laboratory Results - last 24 hr











  11/29/21 11/29/21 Range/Units





  05:57 05:57 


 


WBC  4.64   (4.0-11.0)  K/uL


 


RBC  2.84 L   (4.30-5.90)  M/uL


 


Hgb  10.0 L   (12.0-16.0)  g/dL


 


Hct  29.6 L   (36.0-46.0)  %


 


MCV  104.2 H   (80.0-98.0)  fL


 


MCH  35.2 H   (27.0-32.0)  pg


 


MCHC  33.8   (31.0-37.0)  g/dL


 


RDW Std Deviation  50.7   (28.0-62.0)  fl


 


RDW Coeff of Flo  13   (11.0-15.0)  %


 


Plt Count  84 L   (150-400)  K/uL


 


MPV  11.50   (7.40-12.00)  fL


 


Neut % (Auto)  60.3   (48.0-80.0)  %


 


Lymph % (Auto)  30.0   (16.0-40.0)  %


 


Mono % (Auto)  8.4   (0.0-15.0)  %


 


Eos % (Auto)  1.1   (0.0-7.0)  %


 


Baso % (Auto)  0.2   (0.0-1.5)  %


 


Neut # (Auto)  2.8   (1.4-5.7)  K/uL


 


Lymph # (Auto)  1.4   (0.6-2.4)  K/uL


 


Mono # (Auto)  0.4   (0.0-0.8)  K/uL


 


Eos # (Auto)  0.1   (0.0-0.7)  K/uL


 


Baso # (Auto)  0.0   (0.0-0.1)  K/uL


 


Nucleated RBC %  1.0   /100WBC


 


Nucleated RBCs #  0   K/uL


 


Sodium   137  (136-145)  mmol/L


 


Potassium   3.2 L  (3.5-5.1)  mmol/L


 


Chloride   102  ()  mmol/L


 


Carbon Dioxide   27.5  (21.0-32.0)  mmol/L


 


BUN   10  (7.0-18.0)  mg/dL


 


Creatinine   0.5 L  (0.6-1.0)  mg/dL


 


Est Cr Clr Drug Dosing   136.90  mL/min


 


Estimated GFR (MDRD)   > 60.0  ml/min


 


Glucose   95  ()  mg/dL


 


Calcium   8.2 L  (8.5-10.1)  mg/dL


 


Total Bilirubin   1.7 H  (0.2-1.0)  mg/dL


 


AST   257 H  (15-37)  IU/L


 


ALT   95 H  (14-63)  IU/L


 


Alkaline Phosphatase   115  ()  U/L


 


Total Protein   6.5  (6.4-8.2)  g/dL


 


Albumin   2.1 L  (3.4-5.0)  g/dL


 


Globulin   4.4 H  (2.6-4.0)  g/dL


 


Albumin/Globulin Ratio   0.5 L  (0.9-1.6)  











Med Orders - Current: 


                               Current Medications





Folic Acid (Folic Acid 50 Mg/10 Ml Mdv)  1 mg SUBCUT DAILY [facility]


   Last Admin: 11/29/21 08:19 Dose:  1 mg


   Documented by: 


Sodium Chloride (Normal Saline)  1,000 mls @ 150 mls/hr IV ASDIRECTED [facility]


   Last Admin: 11/29/21 06:03 Dose:  150 mls/hr


   Documented by: 


Ceftriaxone Sodium/Dextrose 2 (gm/ Premix)  50 mls @ 100 mls/hr IV Q24H [facility]


   Last Admin: 11/28/21 15:27 Dose:  100 mls/hr


   Documented by: 


Lorazepam (Lorazepam 2 Mg/Ml Sdv)  0 mg IV Q4H PRN; Protocol


   PRN Reason: Withdrawal Symptoms


   Last Admin: 11/29/21 04:45 Dose:  1 mg


   Documented by: 


Ondansetron HCl (Ondansetron 4 Mg/2 Ml Sdv)  4 mg IVPUSH Q4H PRN


   PRN Reason: Vomiting


   Last Admin: 11/27/21 20:48 Dose:  4 mg


   Documented by: 


Oxycodone HCl (Oxycodone 5 Mg Tab)  5 mg PO Q6H PRN


   PRN Reason: Pain (moderate 4-6)


Pantoprazole Sodium (Pantoprazole 40 Mg/10 Ml Syringe)  40 mg IVPUSH Q12H [facility]


   Last Admin: 11/29/21 04:37 Dose:  40 mg


   Documented by: 


Thiamine HCl (Thiamine 200 Mg/2 Ml Mdv)  100 mg IVPUSH DAILY [facility]


   Last Admin: 11/29/21 08:19 Dose:  100 mg


   Documented by: 





Discontinued Medications





Chlordiazepoxide HCl (Chlordiazepoxide 25 Mg Cap)  50 mg PO Q6H PRN


   PRN Reason: Withdrawal Symptoms


Hydromorphone HCl (Hydromorphone 4 Mg/Ml Syringe)  1 mg IVPUSH Q3H PRN


   PRN Reason: Pain (moderate 4-6)


Sodium Chloride (Normal Saline)  1,000 mls @ 999 mls/hr IV .Bolus ONE


   Stop: 11/27/21 14:07


   Last Admin: 11/27/21 13:23 Dose:  999 mls/hr


   Documented by: 


Ceftriaxone Sodium/Dextrose 1 (gm/ Premix)  50 mls @ 100 mls/hr IV ONETIME ONE


   Stop: 11/27/21 14:12


   Last Admin: 11/27/21 14:04 Dose:  100 mls/hr


   Documented by: 


Sodium Chloride (Normal Saline)  1,000 mls @ 125 mls/hr IV .Bolus ONE


   Stop: 11/27/21 23:39


   Last Admin: 11/27/21 18:01 Dose:  Not Given


   Documented by: 


Ceftriaxone Sodium/Dextrose 2 (gm/ Premix)  50 mls @ 100 mls/hr IV Q24H [facility]


   Last Admin: 11/27/21 20:14 Dose:  Not Given


   Documented by: 


Metronidazole 500 mg/ Premix  100 mls @ 100 mls/hr IV Q8H [facility]


   Last Admin: 11/27/21 20:14 Dose:  Not Given


   Documented by: 


Magnesium Sulfate 2 gm/ Premix  50 mls @ 12.5 mls/hr IV ONETIME ONE


   Stop: 11/27/21 20:16


   Last Admin: 11/27/21 18:15 Dose:  12.5 mls/hr


   Documented by: 


Metronidazole 500 mg/ Premix  100 mls @ 100 mls/hr IV Q8H [facility]


   Last Admin: 11/28/21 05:14 Dose:  100 mls/hr


   Documented by: 


Lorazepam (Lorazepam 2 Mg/Ml Sdv)  1 mg IVPUSH ONETIME ONE


   Stop: 11/27/21 13:08


   Last Admin: 11/27/21 13:24 Dose:  1 mg


   Documented by: 


Methylprednisolone Sodium Succinate (Methylprednisolone Sodium Succinate 40 Mg/1

 Ml Sdv)  60 mg IVPUSH DAILY [facility]


   Last Admin: 11/28/21 08:54 Dose:  60 mg


   Documented by: 


Metronidazole (Metronidazole 250 Mg Tab)  2,000 mg PO NOW ONE


   Stop: 11/27/21 13:56


   Last Admin: 11/27/21 14:04 Dose:  2,000 mg


   Documented by: 


Morphine Sulfate (Morphine 4 Mg/Ml Vial)  4 mg IVPUSH ONETIME ONE


   Stop: 11/27/21 13:08


   Last Admin: 11/27/21 13:25 Dose:  4 mg


   Documented by: 


Ondansetron HCl (Ondansetron 4 Mg/2 Ml Sdv)  4 mg IVPUSH ONETIME ONE


   Stop: 11/27/21 13:08


   Last Admin: 11/27/21 13:24 Dose:  4 mg


   Documented by: 


Pantoprazole Sodium (Pantoprazole 40 Mg/10 Ml Syringe)  40 mg IVPUSH NOW ONE


   Stop: 11/27/21 13:08


   Last Admin: 11/27/21 14:24 Dose:  40 mg


   Documented by: 


Potassium Chloride (Potassium Chloride 20 Meq Tab.Er)  40 meq PO ONETIME ONE


   Stop: 11/29/21 07:52


   Last Admin: 11/29/21 08:18 Dose:  40 meq


   Documented by: 


Tramadol HCl (Tramadol 50 Mg Tab)  50 mg PO Q6H PRN


   PRN Reason: Pain


   Last Admin: 11/28/21 20:06 Dose:  50 mg


   Documented by: 











- Exam


General: Reports: Alert, Oriented, Cooperative


HEENT: Reports: Mucous Membr. Moist/Pink


Neck: Reports: Trachea Midline


Lungs: Reports: Clear to Auscultation, Normal Respiratory Effort


Cardiovascular: Reports: Regular Rate, Regular Rhythm


GI/Abdominal Exam: Normal Bowel Sounds, Soft, Non-Tender

## 2022-01-14 ENCOUNTER — HOSPITAL ENCOUNTER (EMERGENCY)
Dept: HOSPITAL 56 - MW.ED | Age: 37
Discharge: HOME | End: 2022-01-14
Payer: SELF-PAY

## 2022-01-14 VITALS — HEART RATE: 96 BPM | DIASTOLIC BLOOD PRESSURE: 53 MMHG | SYSTOLIC BLOOD PRESSURE: 104 MMHG

## 2022-01-14 DIAGNOSIS — D53.9: ICD-10-CM
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
## 2022-01-14 DIAGNOSIS — I10: ICD-10-CM

## 2022-01-14 DIAGNOSIS — J45.909: ICD-10-CM

## 2022-01-14 DIAGNOSIS — R74.8: ICD-10-CM

## 2022-01-14 DIAGNOSIS — S20.211A: Primary | ICD-10-CM

## 2022-01-14 LAB
BUN SERPL-MCNC: 4 MG/DL (ref 7–18)
CHLORIDE SERPL-SCNC: 95 MMOL/L (ref 98–107)
CO2 SERPL-SCNC: 28.5 MMOL/L (ref 21–32)
GLUCOSE SERPL-MCNC: 95 MG/DL (ref 74–106)
LIPASE SERPL-CCNC: 354 U/L (ref 73–393)
POTASSIUM SERPL-SCNC: 3.3 MMOL/L (ref 3.5–5.1)
SODIUM SERPL-SCNC: 136 MMOL/L (ref 136–145)

## 2022-01-26 ENCOUNTER — HOSPITAL ENCOUNTER (EMERGENCY)
Dept: HOSPITAL 56 - MW.ED | Age: 37
Discharge: NURSING, REHAB OR LONG-TERM CARE | End: 2022-01-26
Payer: MEDICAID

## 2022-01-26 VITALS — SYSTOLIC BLOOD PRESSURE: 92 MMHG | HEART RATE: 94 BPM | DIASTOLIC BLOOD PRESSURE: 45 MMHG

## 2022-01-26 DIAGNOSIS — E80.6: ICD-10-CM

## 2022-01-26 DIAGNOSIS — Z20.822: ICD-10-CM

## 2022-01-26 DIAGNOSIS — I10: ICD-10-CM

## 2022-01-26 DIAGNOSIS — K81.9: Primary | ICD-10-CM

## 2022-01-26 DIAGNOSIS — E87.2: ICD-10-CM

## 2022-01-26 DIAGNOSIS — Z72.0: ICD-10-CM

## 2022-01-26 LAB
BUN SERPL-MCNC: 7 MG/DL (ref 7–18)
CHLORIDE SERPL-SCNC: 100 MMOL/L (ref 98–107)
CO2 SERPL-SCNC: 23.5 MMOL/L (ref 21–32)
GLUCOSE SERPL-MCNC: 94 MG/DL (ref 74–106)
POTASSIUM SERPL-SCNC: 4.6 MMOL/L (ref 3.5–5.1)
SODIUM SERPL-SCNC: 137 MMOL/L (ref 136–145)

## 2022-01-26 PROCEDURE — 96375 TX/PRO/DX INJ NEW DRUG ADDON: CPT

## 2022-01-26 PROCEDURE — 84703 CHORIONIC GONADOTROPIN ASSAY: CPT

## 2022-01-26 PROCEDURE — 82945 GLUCOSE OTHER FLUID: CPT

## 2022-01-26 PROCEDURE — 99285 EMERGENCY DEPT VISIT HI MDM: CPT

## 2022-01-26 PROCEDURE — 87075 CULTR BACTERIA EXCEPT BLOOD: CPT

## 2022-01-26 PROCEDURE — 80307 DRUG TEST PRSMV CHEM ANLYZR: CPT

## 2022-01-26 PROCEDURE — 87635 SARS-COV-2 COVID-19 AMP PRB: CPT

## 2022-01-26 PROCEDURE — 80053 COMPREHEN METABOLIC PANEL: CPT

## 2022-01-26 PROCEDURE — 96365 THER/PROPH/DIAG IV INF INIT: CPT

## 2022-01-26 PROCEDURE — 87040 BLOOD CULTURE FOR BACTERIA: CPT

## 2022-01-26 PROCEDURE — 36415 COLL VENOUS BLD VENIPUNCTURE: CPT

## 2022-01-26 PROCEDURE — 87205 SMEAR GRAM STAIN: CPT

## 2022-01-26 PROCEDURE — 89050 BODY FLUID CELL COUNT: CPT

## 2022-01-26 PROCEDURE — 85025 COMPLETE CBC W/AUTO DIFF WBC: CPT

## 2022-01-26 PROCEDURE — 84157 ASSAY OF PROTEIN OTHER: CPT

## 2022-01-26 PROCEDURE — 74177 CT ABD & PELVIS W/CONTRAST: CPT

## 2022-01-26 PROCEDURE — 83735 ASSAY OF MAGNESIUM: CPT

## 2022-01-26 PROCEDURE — 85610 PROTHROMBIN TIME: CPT

## 2022-01-26 PROCEDURE — 87070 CULTURE OTHR SPECIMN AEROBIC: CPT

## 2022-01-26 PROCEDURE — 83605 ASSAY OF LACTIC ACID: CPT

## 2022-01-26 PROCEDURE — U0002 COVID-19 LAB TEST NON-CDC: HCPCS

## 2022-01-26 PROCEDURE — 76705 ECHO EXAM OF ABDOMEN: CPT

## 2022-01-26 PROCEDURE — 82140 ASSAY OF AMMONIA: CPT

## 2022-02-02 ENCOUNTER — HOSPITAL ENCOUNTER (EMERGENCY)
Dept: HOSPITAL 56 - MW.ED | Age: 37
Discharge: NURSING, REHAB OR LONG-TERM CARE | End: 2022-02-02
Payer: MEDICAID

## 2022-02-02 DIAGNOSIS — K76.7: ICD-10-CM

## 2022-02-02 DIAGNOSIS — Z20.822: ICD-10-CM

## 2022-02-02 DIAGNOSIS — I10: ICD-10-CM

## 2022-02-02 DIAGNOSIS — N17.9: Primary | ICD-10-CM

## 2022-02-02 LAB
BILIRUB INDIRECT SERPL-MCNC: 4.4 MG/DL
BUN SERPL-MCNC: 30 MG/DL (ref 7–18)
CHLORIDE SERPL-SCNC: 102 MMOL/L (ref 98–107)
CO2 SERPL-SCNC: 18.8 MMOL/L (ref 21–32)
GLUCOSE SERPL-MCNC: 97 MG/DL (ref 74–106)
LIPASE SERPL-CCNC: 97 U/L (ref 73–393)
POTASSIUM SERPL-SCNC: 3.5 MMOL/L (ref 3.5–5.1)
SODIUM SERPL-SCNC: 133 MMOL/L (ref 136–145)

## 2022-02-02 PROCEDURE — 71045 X-RAY EXAM CHEST 1 VIEW: CPT

## 2022-02-02 PROCEDURE — 99285 EMERGENCY DEPT VISIT HI MDM: CPT

## 2022-02-02 PROCEDURE — U0002 COVID-19 LAB TEST NON-CDC: HCPCS

## 2022-02-02 PROCEDURE — 85610 PROTHROMBIN TIME: CPT

## 2022-02-02 PROCEDURE — 80053 COMPREHEN METABOLIC PANEL: CPT

## 2022-02-02 PROCEDURE — 85025 COMPLETE CBC W/AUTO DIFF WBC: CPT

## 2022-02-02 PROCEDURE — 87635 SARS-COV-2 COVID-19 AMP PRB: CPT

## 2022-02-02 PROCEDURE — 36415 COLL VENOUS BLD VENIPUNCTURE: CPT

## 2022-02-02 PROCEDURE — 82248 BILIRUBIN DIRECT: CPT

## 2022-02-02 PROCEDURE — 84703 CHORIONIC GONADOTROPIN ASSAY: CPT

## 2022-02-02 PROCEDURE — 83690 ASSAY OF LIPASE: CPT

## 2022-02-02 PROCEDURE — 82247 BILIRUBIN TOTAL: CPT

## 2022-02-03 VITALS — SYSTOLIC BLOOD PRESSURE: 111 MMHG | DIASTOLIC BLOOD PRESSURE: 62 MMHG | HEART RATE: 62 BPM

## 2022-03-11 ENCOUNTER — HOSPITAL ENCOUNTER (EMERGENCY)
Dept: HOSPITAL 56 - MW.ED | Age: 37
Discharge: HOME | End: 2022-03-11
Payer: MEDICAID

## 2022-03-11 VITALS — HEART RATE: 87 BPM | DIASTOLIC BLOOD PRESSURE: 86 MMHG | SYSTOLIC BLOOD PRESSURE: 134 MMHG

## 2022-03-11 DIAGNOSIS — T82.838A: Primary | ICD-10-CM

## 2022-03-11 DIAGNOSIS — Z99.2: ICD-10-CM

## 2022-03-11 DIAGNOSIS — N19: ICD-10-CM

## 2022-03-11 DIAGNOSIS — Z79.899: ICD-10-CM

## 2022-03-14 ENCOUNTER — HOSPITAL ENCOUNTER (EMERGENCY)
Dept: HOSPITAL 56 - MW.ED | Age: 37
Discharge: HOME | End: 2022-03-14
Payer: MEDICAID

## 2022-03-14 VITALS — HEART RATE: 88 BPM | DIASTOLIC BLOOD PRESSURE: 63 MMHG | SYSTOLIC BLOOD PRESSURE: 118 MMHG

## 2022-03-14 DIAGNOSIS — K70.11: Primary | ICD-10-CM

## 2022-03-14 DIAGNOSIS — Z20.822: ICD-10-CM

## 2022-03-14 DIAGNOSIS — I10: ICD-10-CM

## 2022-03-14 LAB
BUN SERPL-MCNC: 16 MG/DL (ref 7–18)
CHLORIDE SERPL-SCNC: 99 MMOL/L (ref 98–107)
CO2 SERPL-SCNC: 27.6 MMOL/L (ref 21–32)
EGFRCR SERPLBLD CKD-EPI 2021: 23.1 ML/MIN
GLUCOSE SERPL-MCNC: 83 MG/DL (ref 74–106)
LIPASE SERPL-CCNC: 320 U/L (ref 73–393)
POTASSIUM SERPL-SCNC: 3.3 MMOL/L (ref 3.5–5.1)
SODIUM SERPL-SCNC: 137 MMOL/L (ref 136–145)

## 2022-03-14 PROCEDURE — 36415 COLL VENOUS BLD VENIPUNCTURE: CPT

## 2022-03-14 PROCEDURE — 87635 SARS-COV-2 COVID-19 AMP PRB: CPT

## 2022-03-14 PROCEDURE — 96374 THER/PROPH/DIAG INJ IV PUSH: CPT

## 2022-03-14 PROCEDURE — 71045 X-RAY EXAM CHEST 1 VIEW: CPT

## 2022-03-14 PROCEDURE — 87086 URINE CULTURE/COLONY COUNT: CPT

## 2022-03-14 PROCEDURE — U0002 COVID-19 LAB TEST NON-CDC: HCPCS

## 2022-03-14 PROCEDURE — 85730 THROMBOPLASTIN TIME PARTIAL: CPT

## 2022-03-14 PROCEDURE — 74176 CT ABD & PELVIS W/O CONTRAST: CPT

## 2022-03-14 PROCEDURE — 83690 ASSAY OF LIPASE: CPT

## 2022-03-14 PROCEDURE — 87040 BLOOD CULTURE FOR BACTERIA: CPT

## 2022-03-14 PROCEDURE — 85610 PROTHROMBIN TIME: CPT

## 2022-03-14 PROCEDURE — 87154 CUL TYP ID BLD PTHGN 6+ TRGT: CPT

## 2022-03-14 PROCEDURE — 85025 COMPLETE CBC W/AUTO DIFF WBC: CPT

## 2022-03-14 PROCEDURE — 81001 URINALYSIS AUTO W/SCOPE: CPT

## 2022-03-14 PROCEDURE — 83605 ASSAY OF LACTIC ACID: CPT

## 2022-03-14 PROCEDURE — 80053 COMPREHEN METABOLIC PANEL: CPT

## 2022-03-14 PROCEDURE — 99284 EMERGENCY DEPT VISIT MOD MDM: CPT

## 2022-03-15 ENCOUNTER — HOSPITAL ENCOUNTER (EMERGENCY)
Dept: HOSPITAL 56 - MW.ED | Age: 37
Discharge: HOME | End: 2022-03-15
Payer: MEDICAID

## 2022-03-15 VITALS — HEART RATE: 83 BPM | DIASTOLIC BLOOD PRESSURE: 60 MMHG | SYSTOLIC BLOOD PRESSURE: 123 MMHG

## 2022-03-15 DIAGNOSIS — Z79.899: ICD-10-CM

## 2022-03-15 DIAGNOSIS — R78.81: Primary | ICD-10-CM

## 2022-03-15 DIAGNOSIS — Z99.2: ICD-10-CM

## 2022-03-15 DIAGNOSIS — R18.8: ICD-10-CM

## 2022-03-15 DIAGNOSIS — I12.0: ICD-10-CM

## 2022-03-15 DIAGNOSIS — N18.6: ICD-10-CM

## 2022-03-15 LAB
BUN SERPL-MCNC: 21 MG/DL (ref 7–18)
CHLORIDE SERPL-SCNC: 99 MMOL/L (ref 98–107)
CO2 SERPL-SCNC: 24.9 MMOL/L (ref 21–32)
GLUCOSE SERPL-MCNC: 98 MG/DL (ref 74–106)
LIPASE SERPL-CCNC: 297 U/L (ref 73–393)
POTASSIUM SERPL-SCNC: 3 MMOL/L (ref 3.5–5.1)
SODIUM SERPL-SCNC: 138 MMOL/L (ref 136–145)

## 2022-03-15 PROCEDURE — 83690 ASSAY OF LIPASE: CPT

## 2022-03-15 PROCEDURE — 96367 TX/PROPH/DG ADDL SEQ IV INF: CPT

## 2022-03-15 PROCEDURE — 49084 PERITONEAL LAVAGE: CPT

## 2022-03-15 PROCEDURE — 87070 CULTURE OTHR SPECIMN AEROBIC: CPT

## 2022-03-15 PROCEDURE — 93005 ELECTROCARDIOGRAM TRACING: CPT

## 2022-03-15 PROCEDURE — 81001 URINALYSIS AUTO W/SCOPE: CPT

## 2022-03-15 PROCEDURE — 87075 CULTR BACTERIA EXCEPT BLOOD: CPT

## 2022-03-15 PROCEDURE — 96375 TX/PRO/DX INJ NEW DRUG ADDON: CPT

## 2022-03-15 PROCEDURE — 80053 COMPREHEN METABOLIC PANEL: CPT

## 2022-03-15 PROCEDURE — 82945 GLUCOSE OTHER FLUID: CPT

## 2022-03-15 PROCEDURE — 84157 ASSAY OF PROTEIN OTHER: CPT

## 2022-03-15 PROCEDURE — 83605 ASSAY OF LACTIC ACID: CPT

## 2022-03-15 PROCEDURE — 84703 CHORIONIC GONADOTROPIN ASSAY: CPT

## 2022-03-15 PROCEDURE — 85025 COMPLETE CBC W/AUTO DIFF WBC: CPT

## 2022-03-15 PROCEDURE — 84484 ASSAY OF TROPONIN QUANT: CPT

## 2022-03-15 PROCEDURE — 36415 COLL VENOUS BLD VENIPUNCTURE: CPT

## 2022-03-15 PROCEDURE — 80307 DRUG TEST PRSMV CHEM ANLYZR: CPT

## 2022-03-15 PROCEDURE — 87205 SMEAR GRAM STAIN: CPT

## 2022-03-15 PROCEDURE — 83735 ASSAY OF MAGNESIUM: CPT

## 2022-03-15 PROCEDURE — 96365 THER/PROPH/DIAG IV INF INIT: CPT

## 2022-03-15 PROCEDURE — 87040 BLOOD CULTURE FOR BACTERIA: CPT

## 2022-03-15 PROCEDURE — 99284 EMERGENCY DEPT VISIT MOD MDM: CPT

## 2022-03-15 PROCEDURE — 83615 LACTATE (LD) (LDH) ENZYME: CPT

## 2022-03-15 PROCEDURE — 89050 BODY FLUID CELL COUNT: CPT

## 2022-03-31 ENCOUNTER — HOSPITAL ENCOUNTER (EMERGENCY)
Dept: HOSPITAL 56 - MW.ED | Age: 37
Discharge: HOME | End: 2022-03-31
Payer: MEDICAID

## 2022-03-31 VITALS — HEART RATE: 85 BPM | DIASTOLIC BLOOD PRESSURE: 72 MMHG | SYSTOLIC BLOOD PRESSURE: 119 MMHG

## 2022-03-31 DIAGNOSIS — R07.2: Primary | ICD-10-CM

## 2022-03-31 DIAGNOSIS — I10: ICD-10-CM

## 2022-03-31 DIAGNOSIS — F17.210: ICD-10-CM

## 2022-03-31 DIAGNOSIS — F32.A: ICD-10-CM

## 2022-03-31 DIAGNOSIS — Z79.899: ICD-10-CM

## 2022-03-31 DIAGNOSIS — F41.9: ICD-10-CM

## 2022-03-31 LAB
BUN SERPL-MCNC: 8 MG/DL (ref 7–18)
CHLORIDE SERPL-SCNC: 102 MMOL/L (ref 98–107)
CO2 SERPL-SCNC: 27.4 MMOL/L (ref 21–32)
GLUCOSE SERPL-MCNC: 99 MG/DL (ref 74–106)
POTASSIUM SERPL-SCNC: 3.3 MMOL/L (ref 3.5–5.1)
SODIUM SERPL-SCNC: 137 MMOL/L (ref 136–145)

## 2022-03-31 PROCEDURE — 80053 COMPREHEN METABOLIC PANEL: CPT

## 2022-03-31 PROCEDURE — 99285 EMERGENCY DEPT VISIT HI MDM: CPT

## 2022-03-31 PROCEDURE — 85025 COMPLETE CBC W/AUTO DIFF WBC: CPT

## 2022-03-31 PROCEDURE — 96375 TX/PRO/DX INJ NEW DRUG ADDON: CPT

## 2022-03-31 PROCEDURE — 93005 ELECTROCARDIOGRAM TRACING: CPT

## 2022-03-31 PROCEDURE — 84484 ASSAY OF TROPONIN QUANT: CPT

## 2022-03-31 PROCEDURE — 96374 THER/PROPH/DIAG INJ IV PUSH: CPT

## 2022-03-31 PROCEDURE — 71045 X-RAY EXAM CHEST 1 VIEW: CPT

## 2022-03-31 PROCEDURE — 36415 COLL VENOUS BLD VENIPUNCTURE: CPT

## 2022-04-04 ENCOUNTER — HOSPITAL ENCOUNTER (OUTPATIENT)
Dept: HOSPITAL 56 - MW.ED | Age: 37
Setting detail: OBSERVATION
LOS: 1 days | Discharge: HOME | End: 2022-04-05
Attending: INTERNAL MEDICINE | Admitting: INTERNAL MEDICINE
Payer: MEDICAID

## 2022-04-04 DIAGNOSIS — D69.6: Primary | ICD-10-CM

## 2022-04-04 DIAGNOSIS — I10: ICD-10-CM

## 2022-04-04 DIAGNOSIS — F41.0: ICD-10-CM

## 2022-04-04 DIAGNOSIS — Z79.899: ICD-10-CM

## 2022-04-04 DIAGNOSIS — J45.909: ICD-10-CM

## 2022-04-04 DIAGNOSIS — Z20.822: ICD-10-CM

## 2022-04-04 DIAGNOSIS — Z79.890: ICD-10-CM

## 2022-04-04 DIAGNOSIS — F32.A: ICD-10-CM

## 2022-04-04 LAB
BUN SERPL-MCNC: 4 MG/DL (ref 7–18)
CHLORIDE SERPL-SCNC: 104 MMOL/L (ref 98–107)
CO2 SERPL-SCNC: 30.2 MMOL/L (ref 21–32)
GLUCOSE SERPL-MCNC: 90 MG/DL (ref 74–106)
POTASSIUM SERPL-SCNC: 4 MMOL/L (ref 3.5–5.1)
SODIUM SERPL-SCNC: 138 MMOL/L (ref 136–145)

## 2022-04-04 PROCEDURE — 96374 THER/PROPH/DIAG INJ IV PUSH: CPT

## 2022-04-04 PROCEDURE — 36430 TRANSFUSION BLD/BLD COMPNT: CPT

## 2022-04-04 PROCEDURE — 36415 COLL VENOUS BLD VENIPUNCTURE: CPT

## 2022-04-04 PROCEDURE — P9034 PLATELETS, PHERESIS: HCPCS

## 2022-04-04 PROCEDURE — 87635 SARS-COV-2 COVID-19 AMP PRB: CPT

## 2022-04-04 PROCEDURE — 99284 EMERGENCY DEPT VISIT MOD MDM: CPT

## 2022-04-04 PROCEDURE — U0002 COVID-19 LAB TEST NON-CDC: HCPCS

## 2022-04-04 PROCEDURE — 85610 PROTHROMBIN TIME: CPT

## 2022-04-04 PROCEDURE — G0378 HOSPITAL OBSERVATION PER HR: HCPCS

## 2022-04-04 PROCEDURE — 85025 COMPLETE CBC W/AUTO DIFF WBC: CPT

## 2022-04-04 PROCEDURE — 86901 BLOOD TYPING SEROLOGIC RH(D): CPT

## 2022-04-04 PROCEDURE — 86900 BLOOD TYPING SEROLOGIC ABO: CPT

## 2022-04-04 PROCEDURE — 80053 COMPREHEN METABOLIC PANEL: CPT

## 2022-04-05 VITALS — DIASTOLIC BLOOD PRESSURE: 60 MMHG | HEART RATE: 81 BPM | SYSTOLIC BLOOD PRESSURE: 116 MMHG

## 2022-04-08 ENCOUNTER — HOSPITAL ENCOUNTER (EMERGENCY)
Dept: HOSPITAL 56 - MW.ED | Age: 37
LOS: 1 days | Discharge: HOME | End: 2022-04-09
Payer: MEDICAID

## 2022-04-08 DIAGNOSIS — L76.22: Primary | ICD-10-CM

## 2022-04-08 DIAGNOSIS — I10: ICD-10-CM

## 2022-04-08 DIAGNOSIS — Z79.899: ICD-10-CM

## 2022-04-08 DIAGNOSIS — S31.139A: ICD-10-CM

## 2022-04-08 PROCEDURE — 12001 RPR S/N/AX/GEN/TRNK 2.5CM/<: CPT

## 2022-04-08 PROCEDURE — 99283 EMERGENCY DEPT VISIT LOW MDM: CPT

## 2022-04-08 PROCEDURE — 80053 COMPREHEN METABOLIC PANEL: CPT

## 2022-04-08 PROCEDURE — 85610 PROTHROMBIN TIME: CPT

## 2022-04-08 PROCEDURE — 85025 COMPLETE CBC W/AUTO DIFF WBC: CPT

## 2022-04-08 PROCEDURE — 85730 THROMBOPLASTIN TIME PARTIAL: CPT

## 2022-04-08 PROCEDURE — 36415 COLL VENOUS BLD VENIPUNCTURE: CPT

## 2022-04-09 VITALS — SYSTOLIC BLOOD PRESSURE: 135 MMHG | HEART RATE: 82 BPM | DIASTOLIC BLOOD PRESSURE: 82 MMHG

## 2022-04-09 LAB
BUN SERPL-MCNC: 6 MG/DL (ref 7–18)
CHLORIDE SERPL-SCNC: 105 MMOL/L (ref 98–107)
CO2 SERPL-SCNC: 27.4 MMOL/L (ref 21–32)
GLUCOSE SERPL-MCNC: 109 MG/DL (ref 74–106)
POTASSIUM SERPL-SCNC: 4.1 MMOL/L (ref 3.5–5.1)
SODIUM SERPL-SCNC: 139 MMOL/L (ref 136–145)

## 2022-04-15 ENCOUNTER — HOSPITAL ENCOUNTER (EMERGENCY)
Dept: HOSPITAL 56 - MW.ED | Age: 37
Discharge: HOME | End: 2022-04-15
Payer: MEDICAID

## 2022-04-15 ENCOUNTER — HOSPITAL ENCOUNTER (EMERGENCY)
Dept: HOSPITAL 56 - MW.ED | Age: 37
Discharge: HOME | End: 2022-04-15
Payer: COMMERCIAL

## 2022-04-15 VITALS — SYSTOLIC BLOOD PRESSURE: 129 MMHG | HEART RATE: 102 BPM | DIASTOLIC BLOOD PRESSURE: 72 MMHG

## 2022-04-15 VITALS — SYSTOLIC BLOOD PRESSURE: 116 MMHG | DIASTOLIC BLOOD PRESSURE: 73 MMHG | HEART RATE: 88 BPM

## 2022-04-15 DIAGNOSIS — R10.11: Primary | ICD-10-CM

## 2022-04-15 DIAGNOSIS — I10: ICD-10-CM

## 2022-04-15 DIAGNOSIS — Z79.899: ICD-10-CM

## 2022-04-15 DIAGNOSIS — M54.6: ICD-10-CM

## 2022-04-15 DIAGNOSIS — G89.29: Primary | ICD-10-CM

## 2022-04-15 DIAGNOSIS — R10.12: ICD-10-CM

## 2022-04-15 DIAGNOSIS — M25.511: ICD-10-CM

## 2022-04-15 DIAGNOSIS — M54.2: ICD-10-CM
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
## 2022-04-15 LAB
BUN SERPL-MCNC: 15 MG/DL (ref 7–18)
CHLORIDE SERPL-SCNC: 107 MMOL/L (ref 98–107)
CO2 SERPL-SCNC: 21.7 MMOL/L (ref 21–32)
GLUCOSE SERPL-MCNC: 103 MG/DL (ref 74–106)
POTASSIUM SERPL-SCNC: 3.1 MMOL/L (ref 3.5–5.1)
SODIUM SERPL-SCNC: 141 MMOL/L (ref 136–145)

## 2022-04-15 PROCEDURE — 72125 CT NECK SPINE W/O DYE: CPT

## 2022-04-15 PROCEDURE — 85610 PROTHROMBIN TIME: CPT

## 2022-04-15 PROCEDURE — 71045 X-RAY EXAM CHEST 1 VIEW: CPT

## 2022-04-15 PROCEDURE — 36415 COLL VENOUS BLD VENIPUNCTURE: CPT

## 2022-04-15 PROCEDURE — 73030 X-RAY EXAM OF SHOULDER: CPT

## 2022-04-15 PROCEDURE — 96374 THER/PROPH/DIAG INJ IV PUSH: CPT

## 2022-04-15 PROCEDURE — 74176 CT ABD & PELVIS W/O CONTRAST: CPT

## 2022-04-15 PROCEDURE — 87086 URINE CULTURE/COLONY COUNT: CPT

## 2022-04-15 PROCEDURE — 85025 COMPLETE CBC W/AUTO DIFF WBC: CPT

## 2022-04-15 PROCEDURE — 96372 THER/PROPH/DIAG INJ SC/IM: CPT

## 2022-04-15 PROCEDURE — 99284 EMERGENCY DEPT VISIT MOD MDM: CPT

## 2022-04-15 PROCEDURE — 80053 COMPREHEN METABOLIC PANEL: CPT

## 2022-04-15 PROCEDURE — 70450 CT HEAD/BRAIN W/O DYE: CPT

## 2022-05-09 ENCOUNTER — HOSPITAL ENCOUNTER (EMERGENCY)
Dept: HOSPITAL 56 - MW.ED | Age: 37
Discharge: HOME | End: 2022-05-09
Payer: MEDICAID

## 2022-05-09 VITALS — SYSTOLIC BLOOD PRESSURE: 123 MMHG | HEART RATE: 77 BPM | DIASTOLIC BLOOD PRESSURE: 48 MMHG

## 2022-05-09 DIAGNOSIS — R10.84: Primary | ICD-10-CM

## 2022-05-09 DIAGNOSIS — N18.6: ICD-10-CM

## 2022-05-09 DIAGNOSIS — Z79.899: ICD-10-CM

## 2022-05-09 DIAGNOSIS — Z99.2: ICD-10-CM

## 2022-05-09 LAB
BUN SERPL-MCNC: 13 MG/DL (ref 7–18)
CHLORIDE SERPL-SCNC: 109 MMOL/L (ref 98–107)
CO2 SERPL-SCNC: 24 MMOL/L (ref 21–32)
GLUCOSE SERPL-MCNC: 80 MG/DL (ref 74–106)
POTASSIUM SERPL-SCNC: 4.4 MMOL/L (ref 3.5–5.1)
SODIUM SERPL-SCNC: 140 MMOL/L (ref 136–145)

## 2022-05-09 PROCEDURE — 96374 THER/PROPH/DIAG INJ IV PUSH: CPT

## 2022-05-09 PROCEDURE — 36415 COLL VENOUS BLD VENIPUNCTURE: CPT

## 2022-05-09 PROCEDURE — 87205 SMEAR GRAM STAIN: CPT

## 2022-05-09 PROCEDURE — 87070 CULTURE OTHR SPECIMN AEROBIC: CPT

## 2022-05-09 PROCEDURE — 99284 EMERGENCY DEPT VISIT MOD MDM: CPT

## 2022-05-09 PROCEDURE — 49083 ABD PARACENTESIS W/IMAGING: CPT

## 2022-05-09 PROCEDURE — 84157 ASSAY OF PROTEIN OTHER: CPT

## 2022-05-09 PROCEDURE — 83605 ASSAY OF LACTIC ACID: CPT

## 2022-05-09 PROCEDURE — 87075 CULTR BACTERIA EXCEPT BLOOD: CPT

## 2022-05-09 PROCEDURE — 87015 SPECIMEN INFECT AGNT CONCNTJ: CPT

## 2022-05-09 PROCEDURE — 85025 COMPLETE CBC W/AUTO DIFF WBC: CPT

## 2022-05-09 PROCEDURE — 71045 X-RAY EXAM CHEST 1 VIEW: CPT

## 2022-05-09 PROCEDURE — 85610 PROTHROMBIN TIME: CPT

## 2022-05-09 PROCEDURE — 82140 ASSAY OF AMMONIA: CPT

## 2022-05-09 PROCEDURE — 80053 COMPREHEN METABOLIC PANEL: CPT

## 2022-05-09 PROCEDURE — 82945 GLUCOSE OTHER FLUID: CPT

## 2022-05-09 PROCEDURE — 89050 BODY FLUID CELL COUNT: CPT

## 2022-05-28 ENCOUNTER — HOSPITAL ENCOUNTER (EMERGENCY)
Dept: HOSPITAL 56 - MW.ED | Age: 37
Discharge: HOME | End: 2022-05-28
Payer: MEDICAID

## 2022-05-28 VITALS — DIASTOLIC BLOOD PRESSURE: 68 MMHG | HEART RATE: 78 BPM | SYSTOLIC BLOOD PRESSURE: 131 MMHG

## 2022-05-28 DIAGNOSIS — K70.11: Primary | ICD-10-CM

## 2022-05-28 DIAGNOSIS — F17.210: ICD-10-CM

## 2022-05-28 DIAGNOSIS — Z79.899: ICD-10-CM

## 2022-05-28 LAB
BUN SERPL-MCNC: 12 MG/DL (ref 7–18)
CHLORIDE SERPL-SCNC: 108 MMOL/L (ref 98–107)
CO2 SERPL-SCNC: 26.9 MMOL/L (ref 21–32)
GLUCOSE SERPL-MCNC: 107 MG/DL (ref 74–106)
LIPASE SERPL-CCNC: 200 U/L (ref 73–393)
POTASSIUM SERPL-SCNC: 4.2 MMOL/L (ref 3.5–5.1)
SODIUM SERPL-SCNC: 141 MMOL/L (ref 136–145)

## 2022-05-28 PROCEDURE — 96372 THER/PROPH/DIAG INJ SC/IM: CPT

## 2022-05-28 PROCEDURE — 85610 PROTHROMBIN TIME: CPT

## 2022-05-28 PROCEDURE — 83605 ASSAY OF LACTIC ACID: CPT

## 2022-05-28 PROCEDURE — 71045 X-RAY EXAM CHEST 1 VIEW: CPT

## 2022-05-28 PROCEDURE — 84703 CHORIONIC GONADOTROPIN ASSAY: CPT

## 2022-05-28 PROCEDURE — 85730 THROMBOPLASTIN TIME PARTIAL: CPT

## 2022-05-28 PROCEDURE — 87040 BLOOD CULTURE FOR BACTERIA: CPT

## 2022-05-28 PROCEDURE — 80053 COMPREHEN METABOLIC PANEL: CPT

## 2022-05-28 PROCEDURE — 83690 ASSAY OF LIPASE: CPT

## 2022-05-28 PROCEDURE — 99284 EMERGENCY DEPT VISIT MOD MDM: CPT

## 2022-05-28 PROCEDURE — 36415 COLL VENOUS BLD VENIPUNCTURE: CPT

## 2022-05-28 PROCEDURE — 80307 DRUG TEST PRSMV CHEM ANLYZR: CPT

## 2022-05-28 PROCEDURE — 85025 COMPLETE CBC W/AUTO DIFF WBC: CPT

## 2022-06-02 ENCOUNTER — HOSPITAL ENCOUNTER (EMERGENCY)
Dept: HOSPITAL 56 - MW.ED | Age: 37
Discharge: HOME | End: 2022-06-02
Payer: MEDICAID

## 2022-06-02 VITALS — SYSTOLIC BLOOD PRESSURE: 150 MMHG | DIASTOLIC BLOOD PRESSURE: 89 MMHG

## 2022-06-02 VITALS — HEART RATE: 87 BPM

## 2022-06-02 DIAGNOSIS — I10: ICD-10-CM

## 2022-06-02 DIAGNOSIS — Z79.899: ICD-10-CM

## 2022-06-02 DIAGNOSIS — Z99.2: ICD-10-CM

## 2022-06-02 DIAGNOSIS — F10.920: ICD-10-CM

## 2022-06-02 DIAGNOSIS — N18.6: ICD-10-CM

## 2022-06-02 DIAGNOSIS — R10.84: Primary | ICD-10-CM

## 2022-06-02 LAB
BUN SERPL-MCNC: 9 MG/DL (ref 7–18)
CHLORIDE SERPL-SCNC: 103 MMOL/L (ref 98–107)
CO2 SERPL-SCNC: 26.4 MMOL/L (ref 21–32)
GLUCOSE SERPL-MCNC: 90 MG/DL (ref 74–106)
POTASSIUM SERPL-SCNC: 3.6 MMOL/L (ref 3.5–5.1)
SODIUM SERPL-SCNC: 139 MMOL/L (ref 136–145)

## 2022-06-02 PROCEDURE — 96374 THER/PROPH/DIAG INJ IV PUSH: CPT

## 2022-06-02 PROCEDURE — 80053 COMPREHEN METABOLIC PANEL: CPT

## 2022-06-02 PROCEDURE — 96375 TX/PRO/DX INJ NEW DRUG ADDON: CPT

## 2022-06-02 PROCEDURE — 99285 EMERGENCY DEPT VISIT HI MDM: CPT

## 2022-06-02 PROCEDURE — 93005 ELECTROCARDIOGRAM TRACING: CPT

## 2022-06-02 PROCEDURE — 71045 X-RAY EXAM CHEST 1 VIEW: CPT

## 2022-06-02 PROCEDURE — 80307 DRUG TEST PRSMV CHEM ANLYZR: CPT

## 2022-06-02 PROCEDURE — 36415 COLL VENOUS BLD VENIPUNCTURE: CPT

## 2022-06-02 PROCEDURE — 96376 TX/PRO/DX INJ SAME DRUG ADON: CPT

## 2022-06-02 PROCEDURE — 85025 COMPLETE CBC W/AUTO DIFF WBC: CPT

## 2022-06-10 ENCOUNTER — HOSPITAL ENCOUNTER (EMERGENCY)
Dept: HOSPITAL 56 - MW.ED | Age: 37
Discharge: LEFT BEFORE DISCHARGE | End: 2022-06-10
Payer: MEDICAID

## 2022-06-10 VITALS — HEART RATE: 98 BPM | SYSTOLIC BLOOD PRESSURE: 146 MMHG | DIASTOLIC BLOOD PRESSURE: 90 MMHG

## 2022-06-10 DIAGNOSIS — I10: ICD-10-CM

## 2022-06-10 DIAGNOSIS — Z79.899: ICD-10-CM

## 2022-06-10 DIAGNOSIS — F17.210: ICD-10-CM

## 2022-06-10 DIAGNOSIS — R10.84: Primary | ICD-10-CM

## 2022-06-10 LAB
BUN SERPL-MCNC: 8 MG/DL (ref 7–18)
CHLORIDE SERPL-SCNC: 110 MMOL/L (ref 98–107)
CO2 SERPL-SCNC: 26.6 MMOL/L (ref 21–32)
EGFRCR SERPLBLD CKD-EPI 2021: > 60 ML/MIN
GLUCOSE SERPL-MCNC: 92 MG/DL (ref 74–106)
LIPASE SERPL-CCNC: 200 U/L (ref 73–393)
POTASSIUM SERPL-SCNC: 3.6 MMOL/L (ref 3.5–5.1)
SODIUM SERPL-SCNC: 144 MMOL/L (ref 136–145)

## 2022-06-10 PROCEDURE — 85730 THROMBOPLASTIN TIME PARTIAL: CPT

## 2022-06-10 PROCEDURE — 99284 EMERGENCY DEPT VISIT MOD MDM: CPT

## 2022-06-10 PROCEDURE — 85610 PROTHROMBIN TIME: CPT

## 2022-06-10 PROCEDURE — 84703 CHORIONIC GONADOTROPIN ASSAY: CPT

## 2022-06-10 PROCEDURE — 83690 ASSAY OF LIPASE: CPT

## 2022-06-10 PROCEDURE — 80307 DRUG TEST PRSMV CHEM ANLYZR: CPT

## 2022-06-10 PROCEDURE — 85025 COMPLETE CBC W/AUTO DIFF WBC: CPT

## 2022-06-10 PROCEDURE — 80053 COMPREHEN METABOLIC PANEL: CPT

## 2022-06-10 PROCEDURE — 36415 COLL VENOUS BLD VENIPUNCTURE: CPT

## 2022-06-10 RX ADMIN — ONDANSETRON ONE MG: 2 INJECTION, SOLUTION INTRAMUSCULAR; INTRAVENOUS at 04:49

## 2022-06-10 RX ADMIN — ONDANSETRON ONE: 2 INJECTION, SOLUTION INTRAMUSCULAR; INTRAVENOUS at 04:59

## 2022-06-16 ENCOUNTER — HOSPITAL ENCOUNTER (EMERGENCY)
Dept: HOSPITAL 56 - MW.ED | Age: 37
Discharge: HOME | End: 2022-06-16
Payer: MEDICAID

## 2022-06-16 VITALS — DIASTOLIC BLOOD PRESSURE: 76 MMHG | SYSTOLIC BLOOD PRESSURE: 131 MMHG | HEART RATE: 71 BPM

## 2022-06-16 DIAGNOSIS — L98.8: Primary | ICD-10-CM

## 2022-06-16 DIAGNOSIS — I10: ICD-10-CM

## 2022-06-16 DIAGNOSIS — Z79.899: ICD-10-CM

## 2022-06-16 LAB
BUN SERPL-MCNC: 11 MG/DL (ref 7–18)
CHLORIDE SERPL-SCNC: 103 MMOL/L (ref 98–107)
CO2 SERPL-SCNC: 21.7 MMOL/L (ref 21–32)
EGFRCR SERPLBLD CKD-EPI 2021: 98 ML/MIN (ref 60–?)
GLUCOSE SERPL-MCNC: 105 MG/DL (ref 74–106)
POTASSIUM SERPL-SCNC: 3.4 MMOL/L (ref 3.5–5.1)
SODIUM SERPL-SCNC: 137 MMOL/L (ref 136–145)

## 2022-06-16 PROCEDURE — 85025 COMPLETE CBC W/AUTO DIFF WBC: CPT

## 2022-06-16 PROCEDURE — 85610 PROTHROMBIN TIME: CPT

## 2022-06-16 PROCEDURE — 71045 X-RAY EXAM CHEST 1 VIEW: CPT

## 2022-06-16 PROCEDURE — 99283 EMERGENCY DEPT VISIT LOW MDM: CPT

## 2022-06-16 PROCEDURE — 83735 ASSAY OF MAGNESIUM: CPT

## 2022-06-16 PROCEDURE — 87040 BLOOD CULTURE FOR BACTERIA: CPT

## 2022-06-16 PROCEDURE — 83605 ASSAY OF LACTIC ACID: CPT

## 2022-06-16 PROCEDURE — 36415 COLL VENOUS BLD VENIPUNCTURE: CPT

## 2022-06-16 PROCEDURE — 93005 ELECTROCARDIOGRAM TRACING: CPT

## 2022-06-16 PROCEDURE — 80053 COMPREHEN METABOLIC PANEL: CPT

## 2022-07-01 ENCOUNTER — HOSPITAL ENCOUNTER (EMERGENCY)
Dept: HOSPITAL 56 - MW.ED | Age: 37
Discharge: HOME | End: 2022-07-01
Payer: MEDICAID

## 2022-07-01 VITALS — DIASTOLIC BLOOD PRESSURE: 83 MMHG | SYSTOLIC BLOOD PRESSURE: 130 MMHG | HEART RATE: 85 BPM

## 2022-07-01 DIAGNOSIS — I10: ICD-10-CM

## 2022-07-01 DIAGNOSIS — F17.210: ICD-10-CM

## 2022-07-01 DIAGNOSIS — Z79.899: ICD-10-CM

## 2022-07-01 DIAGNOSIS — N39.0: Primary | ICD-10-CM

## 2022-07-01 LAB
BUN SERPL-MCNC: 11 MG/DL (ref 7–18)
CHLORIDE SERPL-SCNC: 100 MMOL/L (ref 98–107)
CO2 SERPL-SCNC: 30.4 MMOL/L (ref 21–32)
EGFRCR SERPLBLD CKD-EPI 2021: 86 ML/MIN (ref 60–?)
GLUCOSE SERPL-MCNC: 124 MG/DL (ref 74–106)
LIPASE SERPL-CCNC: 118 U/L (ref 73–393)
POTASSIUM SERPL-SCNC: 3.5 MMOL/L (ref 3.5–5.1)
SODIUM SERPL-SCNC: 136 MMOL/L (ref 136–145)

## 2022-07-01 PROCEDURE — 96365 THER/PROPH/DIAG IV INF INIT: CPT

## 2022-07-01 PROCEDURE — 96376 TX/PRO/DX INJ SAME DRUG ADON: CPT

## 2022-07-01 PROCEDURE — 36415 COLL VENOUS BLD VENIPUNCTURE: CPT

## 2022-07-01 PROCEDURE — 81001 URINALYSIS AUTO W/SCOPE: CPT

## 2022-07-01 PROCEDURE — 85610 PROTHROMBIN TIME: CPT

## 2022-07-01 PROCEDURE — 85025 COMPLETE CBC W/AUTO DIFF WBC: CPT

## 2022-07-01 PROCEDURE — 96375 TX/PRO/DX INJ NEW DRUG ADDON: CPT

## 2022-07-01 PROCEDURE — 80053 COMPREHEN METABOLIC PANEL: CPT

## 2022-07-01 PROCEDURE — 80307 DRUG TEST PRSMV CHEM ANLYZR: CPT

## 2022-07-01 PROCEDURE — 87086 URINE CULTURE/COLONY COUNT: CPT

## 2022-07-01 PROCEDURE — 99284 EMERGENCY DEPT VISIT MOD MDM: CPT

## 2022-07-01 PROCEDURE — 74176 CT ABD & PELVIS W/O CONTRAST: CPT

## 2022-07-01 PROCEDURE — 83690 ASSAY OF LIPASE: CPT

## 2022-07-21 ENCOUNTER — HOSPITAL ENCOUNTER (EMERGENCY)
Dept: HOSPITAL 56 - MW.ED | Age: 37
Discharge: HOME | End: 2022-07-21
Payer: MEDICAID

## 2022-07-21 VITALS — DIASTOLIC BLOOD PRESSURE: 71 MMHG | SYSTOLIC BLOOD PRESSURE: 132 MMHG | HEART RATE: 87 BPM

## 2022-07-21 DIAGNOSIS — Z79.899: ICD-10-CM

## 2022-07-21 DIAGNOSIS — G89.29: Primary | ICD-10-CM

## 2022-07-21 DIAGNOSIS — I10: ICD-10-CM
